# Patient Record
Sex: FEMALE | Race: WHITE | Employment: OTHER | URBAN - METROPOLITAN AREA
[De-identification: names, ages, dates, MRNs, and addresses within clinical notes are randomized per-mention and may not be internally consistent; named-entity substitution may affect disease eponyms.]

---

## 2017-02-03 ENCOUNTER — OFFICE VISIT (OUTPATIENT)
Dept: FAMILY MEDICINE CLINIC | Age: 82
End: 2017-02-03

## 2017-02-03 ENCOUNTER — HOSPITAL ENCOUNTER (OUTPATIENT)
Dept: LAB | Age: 82
Discharge: HOME | End: 2017-02-03
Payer: MEDICARE

## 2017-02-03 VITALS
SYSTOLIC BLOOD PRESSURE: 115 MMHG | HEART RATE: 65 BPM | BODY MASS INDEX: 28.82 KG/M2 | WEIGHT: 173 LBS | RESPIRATION RATE: 16 BRPM | HEIGHT: 65 IN | DIASTOLIC BLOOD PRESSURE: 71 MMHG | TEMPERATURE: 97.9 F | OXYGEN SATURATION: 96 %

## 2017-02-03 DIAGNOSIS — E78.5 HYPERLIPIDEMIA, UNSPECIFIED HYPERLIPIDEMIA TYPE: ICD-10-CM

## 2017-02-03 DIAGNOSIS — G89.29 CHRONIC RIGHT SHOULDER PAIN: ICD-10-CM

## 2017-02-03 DIAGNOSIS — R73.02 GLUCOSE INTOLERANCE (IMPAIRED GLUCOSE TOLERANCE): ICD-10-CM

## 2017-02-03 DIAGNOSIS — E03.2 HYPOTHYROIDISM DUE TO NON-MEDICATION EXOGENOUS SUBSTANCES: ICD-10-CM

## 2017-02-03 DIAGNOSIS — Z78.9 STATIN INTOLERANCE: ICD-10-CM

## 2017-02-03 DIAGNOSIS — N18.30 CHRONIC KIDNEY DISEASE (CKD), STAGE III (MODERATE) (HCC): ICD-10-CM

## 2017-02-03 DIAGNOSIS — M25.511 CHRONIC RIGHT SHOULDER PAIN: ICD-10-CM

## 2017-02-03 DIAGNOSIS — Z00.00 ROUTINE GENERAL MEDICAL EXAMINATION AT A HEALTH CARE FACILITY: Primary | ICD-10-CM

## 2017-02-03 DIAGNOSIS — Z13.39 SCREENING FOR ALCOHOLISM: ICD-10-CM

## 2017-02-03 DIAGNOSIS — I10 ESSENTIAL HYPERTENSION: ICD-10-CM

## 2017-02-03 LAB — HBA1C MFR BLD HPLC: 6.3 % (ref 4.8–5.6)

## 2017-02-03 PROCEDURE — 80053 COMPREHEN METABOLIC PANEL: CPT

## 2017-02-03 PROCEDURE — 84443 ASSAY THYROID STIM HORMONE: CPT

## 2017-02-03 PROCEDURE — 85025 COMPLETE CBC W/AUTO DIFF WBC: CPT

## 2017-02-03 PROCEDURE — 80061 LIPID PANEL: CPT

## 2017-02-03 PROCEDURE — 36415 COLL VENOUS BLD VENIPUNCTURE: CPT

## 2017-02-03 RX ORDER — CARVEDILOL 3.12 MG/1
3.12 TABLET ORAL 2 TIMES DAILY WITH MEALS
Qty: 180 TAB | Refills: 3 | Status: SHIPPED | OUTPATIENT
Start: 2017-02-03 | End: 2017-12-17 | Stop reason: SDUPTHER

## 2017-02-03 NOTE — PROGRESS NOTES
This is a Subsequent Medicare Annual Wellness Visit providing Personalized Prevention Plan Services (PPPS) (Performed 12 months after initial AWV and PPPS )    I have reviewed the patient's medical history in detail and updated the computerized patient record. History     Past Medical History   Diagnosis Date    Allergic rhinitis      asthma allergies    Arthritis     Chronic kidney disease (CKD), stage III (moderate)     GERD (gastroesophageal reflux disease)     Hyperlipidemia     Hypertension     Ill-defined condition      hard of hearing    Ulcerative colitis ([facility] [address] 10/28/2014      Past Surgical History   Procedure Laterality Date    Hx orthopaedic       bilateral knee replacement    Hx cataract removal Bilateral     Hx colonoscopy  9/14     ulcerative colitis     Current Outpatient Prescriptions   Medication Sig Dispense Refill    carvedilol (COREG) 3.125 mg tablet Take 1 Tab by mouth two (2) times daily (with meals). 180 Tab 3    azelastine (ASTELIN) 137 mcg (0.1 %) nasal spray USE 1 SPRAY IN EACH NOSTRIL TWICE A DAY AS DIRECTED 3 Bottle 4    omega-3 fatty acids-vitamin e (FISH OIL) 1,000 mg cap Take 1 Cap by mouth.  felodipine (PLENDIL SR) 10 mg 24 hr tablet TAKE 1 TABLET DAILY 90 Tab 4    VITAMIN D2 50,000 unit capsule TAKE ONE CAPSULE BY MOUTH ONCE WEEKLY 12 Cap 4    levothyroxine (SYNTHROID) 50 mcg tablet TAKE 1 TABLET DAILY BEFORE BREAKFAST 90 Tab 4    allopurinol (ZYLOPRIM) 100 mg tablet TAKE 1 TABLET DAILY 90 Tab 4    UBIDECARENONE (COENZYME Q10) 100 mg tab Take  by mouth.  rosuvastatin (CRESTOR) 5 mg tablet Take 5 mg by mouth nightly. Taking twice a week Mon and Fri      CHLORPHENIRAMINE MALEATE (CHLOR-TRIMETON PO) Take 4 mg by mouth every four (4) hours as needed.  ranitidine hcl (ZANTAC) 150 mg capsule Take 150 mg by mouth as needed.        Allergies   Allergen Reactions    Penicillin G Rash    Statins-Hmg-Coa Reductase Inhibitors Other (comments) arthralgia     Family History   Problem Relation Age of Onset    Diabetes Daughter      Social History   Substance Use Topics    Smoking status: Never Smoker    Smokeless tobacco: Never Used    Alcohol use No     Patient Active Problem List   Diagnosis Code    Hyperlipidemia E78.5    GERD (gastroesophageal reflux disease) K21.9    Hypertension I10    Allergic rhinitis J30.9    Chronic kidney disease (CKD), stage III (moderate) N18.3    Hypothyroid E03.9    Vitamin D deficiency E55.9    Ulcerative colitis ([address] K51.90    Statin intolerance Z78.9    Glucose intolerance (impaired glucose tolerance) R73.02     Ulcerative Colitis - on colonoscopy at [facility] in 2015, took medication for a while, then stopped an no more recurrence of symptoms . Now bowels moving regularly. No blood in BMs. HTN - no home monitoring. No shortness of breath, no chest pain, no vision change, no headache, no lower extremity edema. Not feeling light headed or dizzy. Glucose intolerance -  Not checking at home. No polyuria/polydipsia. No tingling or numbness in feet. Right shoulder pain - chronic since last fall, wonders about if it's arthritis. Hurts a lot when she lays down to go sleep, no history of trauma, no previous surgery, good range of motion. Tylenol helps a little. Difficulty to get clothes on and off. Hypothyroidism - no hair loss, no constipation, no dry skin or brittle nails, no palpitations. Taking medication each morning on an empty stomach.       Depression Risk Factor Screening:     PHQ 2 / 9, over the last two weeks 10/4/2016   Little interest or pleasure in doing things Not at all   Feeling down, depressed or hopeless Not at all   Total Score PHQ 2 0   Trouble falling or staying asleep, or sleeping too much -   Feeling tired or having little energy -   Poor appetite or overeating -   Feeling bad about yourself - or that you are a failure or have let yourself or your family down -   Trouble concentrating on things such as school, work, reading or watching TV -   Moving or speaking so slowly that other people could have noticed; or the opposite being so fidgety that others notice -   Thoughts of being better off dead, or hurting yourself in some way -   PHQ 9 Score -   How difficult have these problems made it for you to do your work, take care of your home and get along with others -     Alcohol Risk Factor Screening: On any occasion during the past 3 months, have you had more than 3 drinks containing alcohol? No    Do you average more than 7 drinks per week? No      Functional Ability and Level of Safety:     Hearing Loss   Moderate - hearing aids    Activities of Daily Living   Self-care. Requires assistance with: no ADLs    Fall Risk     Fall Risk Assessment, last 12 mths 10/4/2016   Able to walk? Yes   Fall in past 12 months? No     Abuse Screen   Patient is not abused    Review of Systems   A comprehensive review of systems was negative except for that written in the HPI. Physical Examination     Evaluation of Cognitive Function:  Mood/affect:  happy  Appearance: age appropriate  Family member/caregiver input: none    Visit Vitals    /71 (BP 1 Location: Left arm, BP Patient Position: Sitting)    Pulse 65    Temp 97.9 °F (36.6 °C) (Oral)    Resp 16    Ht 5' 5\" (1.651 m)    Wt 173 lb (78.5 kg)    SpO2 96%    BMI 28.79 kg/m2     General:  Alert, cooperative, no distress, appears stated age. Head:  Normocephalic, without obvious abnormality, atraumatic. Eyes:  Conjunctivae/corneas clear. PERRL, EOMs intact. Ears:  Normal TMs and external ear canals both ears. Nose: Nares normal. Septum midline. Mucosa normal. No drainage or sinus tenderness. Throat: Lips, mucosa, and tongue normal. Teeth and gums normal.   Neck: Supple, symmetrical, trachea midline, no adenopathy, thyroid: no enlargement/tenderness/nodules, no carotid bruit and no JVD.    Back:   Symmetric, no curvature. ROM normal. No CVA tenderness. Lungs:   Clear to auscultation bilaterally. Chest wall:  No tenderness or deformity. Heart:  Regular rate and rhythm, S1, S2 normal, no murmur, click, rub or gallop. Abdomen:   Soft, non-tender. Bowel sounds normal. No masses,  No organomegaly. Extremities: Good range of motion, some tenderness along scapula, collarbone, and deltoid, pain with extremes ofmotion. Pulses: 2+ and symmetric all extremities. Skin: Skin color, texture, turgor normal. No rashes or lesions. Lymph nodes: Cervical, supraclavicular, and axillary nodes normal.   Neurologic: CNII-XII intact. Normal strength, sensation and reflexes throughout. Results for orders placed or performed in visit on 02/03/17   AMB POC HEMOGLOBIN A1C   Result Value Ref Range    Hemoglobin A1c (POC) 6.3 (A) 4.8 - 5.6 %       Patient Care Team:  Ricco Zepeda MD as PCP - General (Internal Medicine)    Advice/Referrals/Counseling   Education and counseling provided:  Are appropriate based on today's review and evaluation  End-of-Life planning (with patient's consent)  Screening Mammography    Assessment/Plan       ICD-10-CM ICD-9-CM    1. Routine general medical examination at a health care facility - Health Maintenance reviewed - updated. Z00.00 V70.0    2. Chronic kidney disease (CKD), stage III (moderate) - No changes in medications pending lab results. N18.3 585.3    3. Statin intolerance Z78.9 995.27    4. Hypothyroidism due to non-medication exogenous substances - No changes in medications pending lab results. E03.2 244.3 TSH 3RD GENERATION   5. Hyperlipidemia, unspecified hyperlipidemia type - At goal.  Continue current medications. B87.2 027.5 METABOLIC PANEL, COMPREHENSIVE      LIPID PANEL   6. Essential hypertension - At goal.  Continue current medications. I10 401.9 CBC WITH AUTOMATED DIFF   7.  Glucose intolerance (impaired glucose tolerance) - no need for medicaiton at this time, continue diet and exercise R73.02 790.22 AMB POC HEMOGLOBIN A1C   8. Screening for alcoholism Z13.89 V79.1    9. Chronic right shoulder pain - no apparent tear or bursitis. Likely arthritis, refer to ortho. Use tylenolw ith night time advil sparingly M25.511 719.41 REFERRAL TO ORTHOPEDIC SURGERY    G89.29 338.29      Verbal and written instructions (see AVS) provided. Patient expresses understanding of diagnosis and treatment plan. Current Outpatient Prescriptions   Medication Sig Dispense Refill    carvedilol (COREG) 3.125 mg tablet Take 1 Tab by mouth two (2) times daily (with meals). 180 Tab 3    azelastine (ASTELIN) 137 mcg (0.1 %) nasal spray USE 1 SPRAY IN EACH NOSTRIL TWICE A DAY AS DIRECTED 3 Bottle 4    omega-3 fatty acids-vitamin e (FISH OIL) 1,000 mg cap Take 1 Cap by mouth.  felodipine (PLENDIL SR) 10 mg 24 hr tablet TAKE 1 TABLET DAILY 90 Tab 4    VITAMIN D2 50,000 unit capsule TAKE ONE CAPSULE BY MOUTH ONCE WEEKLY 12 Cap 4    levothyroxine (SYNTHROID) 50 mcg tablet TAKE 1 TABLET DAILY BEFORE BREAKFAST 90 Tab 4    allopurinol (ZYLOPRIM) 100 mg tablet TAKE 1 TABLET DAILY 90 Tab 4    UBIDECARENONE (COENZYME Q10) 100 mg tab Take  by mouth.  rosuvastatin (CRESTOR) 5 mg tablet Take 5 mg by mouth nightly. Taking twice a week Mon and Fri      CHLORPHENIRAMINE MALEATE (CHLOR-TRIMETON PO) Take 4 mg by mouth every four (4) hours as needed.  ranitidine hcl (ZANTAC) 150 mg capsule Take 150 mg by mouth as needed.

## 2017-02-03 NOTE — ACP (ADVANCE CARE PLANNING)
Advance Care Planning (ACP) Provider Conversation Snapshot    Date of ACP Conversation: 02/03/17  Persons included in Conversation:  Patient    Has a living will and plans to bring it by to be scanned into system.

## 2017-02-03 NOTE — MR AVS SNAPSHOT
Visit Information Date & Time Provider Department Dept. Phone Encounter #  
 2/3/2017  9:15 AM Mary Anne [facility] [phone] [id number] Upcoming Health Maintenance Date Due  
 MEDICARE YEARLY EXAM 12/10/2016 GLAUCOMA SCREENING Q2Y 5/3/2018 DTaP/Tdap/Td series (2 - Td) 2/3/2027 Allergies as of 2/3/2017  Review Complete On: 2/3/2017 By: Sandy Shepherd Severity Noted Reaction Type Reactions Penicillin G High 06/08/2010    Rash Statins-hmg-coa Reductase Inhibitors  10/28/2014    Other (comments) arthralgia Current Immunizations  Reviewed on 12/10/2015 Name Date Influenza High Dose Vaccine PF 9/15/2016, 9/21/2015 Influenza Vaccine 9/12/2014 Pneumococcal Polysaccharide (PPSV-23) 4/9/2013 Not reviewed this visit You Were Diagnosed With   
  
 Codes Comments Routine general medical examination at a health care facility    -  Primary ICD-10-CM: Z00.00 ICD-9-CM: V70.0 Chronic kidney disease (CKD), stage III (moderate)     ICD-10-CM: N18.3 ICD-9-CM: 724. 3 Statin intolerance     ICD-10-CM: Z78.9 ICD-9-CM: 995.27 Hypothyroidism due to non-medication exogenous substances     ICD-10-CM: E03.2 ICD-9-CM: 244.3 Hyperlipidemia, unspecified hyperlipidemia type     ICD-10-CM: E78.5 ICD-9-CM: 272.4 Essential hypertension     ICD-10-CM: I10 
ICD-9-CM: 401.9 Glucose intolerance (impaired glucose tolerance)     ICD-10-CM: R73.02 
ICD-9-CM: 790.22 Screening for alcoholism     ICD-10-CM: Z13.89 ICD-9-CM: V79.1 Chronic right shoulder pain     ICD-10-CM: M25.511, G89.29 ICD-9-CM: 719.41, 338.29 Vitals BP Pulse Temp Resp Height(growth percentile) Weight(growth percentile) 115/71 (BP 1 Location: Left arm, BP Patient Position: Sitting) 65 97.9 °F (36.6 °C) (Oral) 16 5' 5\" (1.651 m) 173 lb (78.5 kg) SpO2 BMI OB Status Smoking Status 96% 28.79 kg/m2 Postmenopausal Never Smoker BMI and BSA Data Body Mass Index Body Surface Area 28.79 kg/m 2 1.9 m 2 Preferred Pharmacy Pharmacy Name Phone [address] [facility] [phone] Your Updated Medication List  
  
   
This list is accurate as of: 2/3/17 10:40 AM.  Always use your most recent med list.  
  
  
  
  
 allopurinol 100 mg tablet Commonly known as:  ZYLOPRIM  
TAKE 1 TABLET DAILY  
  
 azelastine 137 mcg (0.1 %) nasal spray Commonly known as:  ASTELIN  
USE 1 SPRAY IN EACH NOSTRIL TWICE A DAY AS DIRECTED  
  
 carvedilol 3.125 mg tablet Commonly known as:  Arlin Locket Take 1 Tab by mouth two (2) times daily (with meals). CHLOR-TRIMETON PO Take 4 mg by mouth every four (4) hours as needed. coenzyme Q10 100 mg Tab Take  by mouth. CRESTOR 5 mg tablet Generic drug:  rosuvastatin Take 5 mg by mouth nightly. Taking twice a week Mon and Fri  
  
 felodipine 10 mg 24 hr tablet Commonly known as:  PLENDIL SR  
TAKE 1 TABLET DAILY FISH OIL 1,000 mg Cap Generic drug:  omega-3 fatty acids-vitamin e Take 1 Cap by mouth.  
  
 levothyroxine 50 mcg tablet Commonly known as:  SYNTHROID  
TAKE 1 TABLET DAILY BEFORE BREAKFAST  
  
 VITAMIN D2 50,000 unit capsule Generic drug:  ergocalciferol TAKE ONE CAPSULE BY MOUTH ONCE WEEKLY  
  
 ZANTAC 150 mg capsule Generic drug:  raNITIdine hcl Take 150 mg by mouth as needed. Prescriptions Sent to Pharmacy Refills  
 carvedilol (COREG) 3.125 mg tablet 3 Sig: Take 1 Tab by mouth two (2) times daily (with meals). Class: Normal  
 Pharmacy: [address], [address] [place] [address] #: [phone] Route: Oral  
  
We Performed the Following AMB POC HEMOGLOBIN A1C [40264 CPT(R)] CBC WITH AUTOMATED DIFF [48367 CPT(R)] LIPID PANEL [75338 CPT(R)] METABOLIC PANEL, COMPREHENSIVE [15938 CPT(R)] REFERRAL TO ORTHOPEDIC SURGERY [REF62 Custom] TSH 3RD GENERATION [05761 CPT(R)] Referral Information Referral ID Referred By Referred To  
  
 [id number] [facility] [id number], [address] Vis[address] Status Start Date End Date 1 New Request 2/3/17 2/3/18 If your referral has a status of pending review or denied, additional information will be sent to support the outcome of this decision. Patient Instructions Medicare Wellness Visit, Female The best way to live healthy is to have a healthy lifestyle by eating a well-balanced diet, exercising regularly, limiting alcohol and stopping smoking. Regular physical exams and screening tests are another way to keep healthy. Preventive exams provided by your health care provider can find health problems before they become diseases or illnesses. Preventive services including immunizations, screening tests, monitoring and exams can help you take care of your own health. All people over age 72 should have a pneumovax  and and a prevnar shot to prevent pneumonia. These are once in a lifetime unless you and your provider decide differently. All people over 65 should have a yearly flu shot and a tetanus vaccine every 10 years. A bone mass density to screen for osteoporosis or thinning of the bones should be done every 2 years after 65. Screening for diabetes mellitus with a blood sugar test should be done every year. Glaucoma is a disease of the eye due to increased ocular pressure that can lead to blindness and it should be done every year by an eye professional. 
 
Cardiovascular screening tests that check for elevated lipids (fatty part of blood) which can lead to heart disease and strokes should be done every 5 years.  
 
Colorectal screening that evaluates for blood or polyps in your colon should be done yearly as a stool test or every five years as a flexible sigmoidoscope or every 10 years as a colonoscopy up to age 76. Breast cancer screening with a mammogram is recommended biennially  for women age 54-69. Screening for cervical cancer with a pap smear and pelvic exam is recommended for women after age 72 years every 2 years up to age 79 or when the provider and patient decide to stop. If there is a history of cervical abnormalities or other increased risk for cancer then the test is recommended yearly. Hepatitis C screening is also recommended for anyone born between [birth year] through [address]. A shingles vaccine is also recommended once in a lifetime after age 61. Your Medicare Wellness Exam is recommended annually. Here is a list of your current Health Maintenance items with a due date: 
Up to date Introducing [facility]! Ruth Gill introduces [employer] patient portal. Now you can access parts of your medical record, email your doctor's office, and request medication refills online. 1. In your internet browser, go to [email or web address] 2. Click on the First Time User? Click Here link in the Sign In box. You will see the New Member Sign Up page. 3. Enter your [employer] Access Code exactly as it appears below. You will not need to use this code after youve completed the sign-up process. If you do not sign up before the expiration date, you must request a new code. · [employer] Access Code: [email or web address] Expires: 5/4/2017 10:18 AM 
 
4. Enter the last four digits of your Social Security Number (xxxx) and Date of Birth (mm/dd/yyyy) as indicated and click Submit. You will be taken to the next sign-up page. 5. Create a [employer] ID. This will be your [employer] login ID and cannot be changed, so think of one that is secure and easy to remember. 6. Create a [employer] password. You can change your password at any time. 7. Enter your Password Reset Question and Answer. This can be used at a later time if you forget your password. 8. Enter your e-mail address. You will receive e-mail notification when new information is available in [address] 9. Click Sign Up. You can now view and download portions of your medical record. 10. Click the Download Summary menu link to download a portable copy of your medical information. If you have questions, please visit the Frequently Asked Questions section of the [employer] website. Remember, [employer] is NOT to be used for urgent needs. For medical emergencies, dial 911. Now available from your iPhone and Android! Please provide this summary of care documentation to your next provider. Your primary care clinician is listed as Amy Abdi. If you have any questions after today's visit, please call [phone].

## 2017-02-03 NOTE — PATIENT INSTRUCTIONS

## 2017-02-04 LAB
ALBUMIN SERPL-MCNC: 4.4 G/DL (ref 3.5–4.7)
ALBUMIN/GLOB SERPL: 1.3 {RATIO} (ref 1.1–2.5)
ALP SERPL-CCNC: 60 IU/L (ref 39–117)
ALT SERPL-CCNC: 32 IU/L (ref 0–32)
AST SERPL-CCNC: 37 IU/L (ref 0–40)
BASOPHILS # BLD AUTO: 0 X10E3/UL (ref 0–0.2)
BASOPHILS NFR BLD AUTO: 1 %
BILIRUB SERPL-MCNC: 0.6 MG/DL (ref 0–1.2)
BUN SERPL-MCNC: 30 MG/DL (ref 8–27)
BUN/CREAT SERPL: 20 (ref 11–26)
CALCIUM SERPL-MCNC: 9.8 MG/DL (ref 8.7–10.3)
CHLORIDE SERPL-SCNC: 102 MMOL/L (ref 96–106)
CHOLEST SERPL-MCNC: 195 MG/DL (ref 100–199)
CO2 SERPL-SCNC: 21 MMOL/L (ref 18–29)
CREAT SERPL-MCNC: 1.52 MG/DL (ref 0.57–1)
EOSINOPHIL # BLD AUTO: 0.4 X10E3/UL (ref 0–0.4)
EOSINOPHIL NFR BLD AUTO: 4 %
ERYTHROCYTE [DISTWIDTH] IN BLOOD BY AUTOMATED COUNT: 15.2 % (ref 12.3–15.4)
GLOBULIN SER CALC-MCNC: 3.3 G/DL (ref 1.5–4.5)
GLUCOSE SERPL-MCNC: 125 MG/DL (ref 65–99)
HCT VFR BLD AUTO: 39.2 % (ref 34–46.6)
HDLC SERPL-MCNC: 75 MG/DL
HGB BLD-MCNC: 13.2 G/DL (ref 11.1–15.9)
IMM GRANULOCYTES # BLD: 0 X10E3/UL (ref 0–0.1)
IMM GRANULOCYTES NFR BLD: 0 %
INTERPRETATION, 910389: NORMAL
INTERPRETATION: NORMAL
LDLC SERPL CALC-MCNC: 97 MG/DL (ref 0–99)
LYMPHOCYTES # BLD AUTO: 3.3 X10E3/UL (ref 0.7–3.1)
LYMPHOCYTES NFR BLD AUTO: 39 %
MCH RBC QN AUTO: 32.5 PG (ref 26.6–33)
MCHC RBC AUTO-ENTMCNC: 33.7 G/DL (ref 31.5–35.7)
MCV RBC AUTO: 97 FL (ref 79–97)
MONOCYTES # BLD AUTO: 0.7 X10E3/UL (ref 0.1–0.9)
MONOCYTES NFR BLD AUTO: 8 %
NEUTROPHILS # BLD AUTO: 4 X10E3/UL (ref 1.4–7)
NEUTROPHILS NFR BLD AUTO: 48 %
PDF IMAGE, 910387: NORMAL
PLATELET # BLD AUTO: 247 X10E3/UL (ref 150–379)
POTASSIUM SERPL-SCNC: 5.1 MMOL/L (ref 3.5–5.2)
PROT SERPL-MCNC: 7.7 G/DL (ref 6–8.5)
RBC # BLD AUTO: 4.06 X10E6/UL (ref 3.77–5.28)
SODIUM SERPL-SCNC: 139 MMOL/L (ref 134–144)
TRIGL SERPL-MCNC: 116 MG/DL (ref 0–149)
TSH SERPL DL<=0.005 MIU/L-ACNC: 2.23 UIU/ML (ref 0.45–4.5)
VLDLC SERPL CALC-MCNC: 23 MG/DL (ref 5–40)
WBC # BLD AUTO: 8.5 X10E3/UL (ref 3.4–10.8)

## 2017-08-10 RX ORDER — ERGOCALCIFEROL 1.25 MG/1
CAPSULE ORAL
Qty: 12 CAP | Refills: 4 | Status: SHIPPED | OUTPATIENT
Start: 2017-08-10 | End: 2018-09-07 | Stop reason: DRUGHIGH

## 2017-08-24 ENCOUNTER — HOSPITAL ENCOUNTER (OUTPATIENT)
Dept: LAB | Age: 82
Discharge: HOME | End: 2017-08-24
Payer: MEDICARE

## 2017-08-24 ENCOUNTER — OFFICE VISIT (OUTPATIENT)
Dept: FAMILY MEDICINE CLINIC | Age: 82
End: 2017-08-24

## 2017-08-24 VITALS
TEMPERATURE: 97.9 F | WEIGHT: 168 LBS | BODY MASS INDEX: 27.99 KG/M2 | RESPIRATION RATE: 18 BRPM | DIASTOLIC BLOOD PRESSURE: 69 MMHG | HEART RATE: 74 BPM | SYSTOLIC BLOOD PRESSURE: 114 MMHG | OXYGEN SATURATION: 96 % | HEIGHT: 65 IN

## 2017-08-24 DIAGNOSIS — R73.02 GLUCOSE INTOLERANCE (IMPAIRED GLUCOSE TOLERANCE): Primary | ICD-10-CM

## 2017-08-24 DIAGNOSIS — M79.671 RIGHT FOOT PAIN: ICD-10-CM

## 2017-08-24 DIAGNOSIS — E03.2 HYPOTHYROIDISM DUE TO NON-MEDICATION EXOGENOUS SUBSTANCES: ICD-10-CM

## 2017-08-24 DIAGNOSIS — I10 ESSENTIAL HYPERTENSION: ICD-10-CM

## 2017-08-24 DIAGNOSIS — E78.5 HYPERLIPIDEMIA, UNSPECIFIED HYPERLIPIDEMIA TYPE: ICD-10-CM

## 2017-08-24 DIAGNOSIS — Z12.31 ENCOUNTER FOR MAMMOGRAM TO ESTABLISH BASELINE MAMMOGRAM: ICD-10-CM

## 2017-08-24 PROCEDURE — 36415 COLL VENOUS BLD VENIPUNCTURE: CPT

## 2017-08-24 PROCEDURE — 84443 ASSAY THYROID STIM HORMONE: CPT

## 2017-08-24 PROCEDURE — 83036 HEMOGLOBIN GLYCOSYLATED A1C: CPT

## 2017-08-24 PROCEDURE — 80061 LIPID PANEL: CPT

## 2017-08-24 PROCEDURE — 85025 COMPLETE CBC W/AUTO DIFF WBC: CPT

## 2017-08-24 PROCEDURE — 80053 COMPREHEN METABOLIC PANEL: CPT

## 2017-08-24 NOTE — PROGRESS NOTES
HPI:  81 y.o.  presents for follow up appointment. No hospital, ER or specialist visits since last primary care visit except as noted below. Right foot pain - thinks it's arthritis, toes are deviating. Left breast tender - can't feel a lump, but tender. Patient Active Problem List    Diagnosis    Glucose intolerance (impaired glucose tolerance) - some polyuria - getting up every 2 hours overnight. No polydipsia. No unexplained weight loss. No change in vision. No tingling or numbness in feet. Has a sore on left leg    Ulcerative colitis ([address] - refuses further colonoscopys, still with chronic diarrhea - nonbloody.  Statin intolerance    Vitamin D deficiency    Hypothyroid - No hair loss, no constipation, no dry skin or brittle nails, no palpitations. Taking medication each morning on an empty stomach.  Hyperlipidemia - Takes medication at night as directed, no muscle aches. Tries to watch diet.  GERD (gastroesophageal reflux disease)    Hypertension - No home monitoring. Compliant with medication. Sometimes shortness of breath which she blames on her nasal congestion, no chest pain, no vision change, no headache, chronic mild lower extremity edema.  Allergic rhinitis - using nasal spray and zyrtec.  Sees ENT, has deviated septum    Chronic kidney disease (CKD), stage III (moderate) - no change in edema         Past Medical History:   Diagnosis Date    Allergic rhinitis     asthma allergies    Arthritis     Chronic kidney disease (CKD), stage III (moderate)     GERD (gastroesophageal reflux disease)     Hyperlipidemia     Hypertension     Ill-defined condition     hard of hearing    Ulcerative colitis ([address] 10/28/2014       Social History   Substance Use Topics    Smoking status: Never Smoker    Smokeless tobacco: Never Used    Alcohol use No       Outpatient Prescriptions Marked as Taking for the 8/24/17 encounter (Office Visit) with Camila Welch MD   Medication Sig Dispense Refill    ergocalciferol (VITAMIN D2) 50,000 unit capsule TAKE ONE CAPSULE BY MOUTH ONCE WEEKLY 12 Cap 4    carvedilol (COREG) 3.125 mg tablet Take 1 Tab by mouth two (2) times daily (with meals). 180 Tab 3    azelastine (ASTELIN) 137 mcg (0.1 %) nasal spray USE 1 SPRAY IN EACH NOSTRIL TWICE A DAY AS DIRECTED 3 Bottle 4    omega-3 fatty acids-vitamin e (FISH OIL) 1,000 mg cap Take 1 Cap by mouth.  felodipine (PLENDIL SR) 10 mg 24 hr tablet TAKE 1 TABLET DAILY 90 Tab 4    levothyroxine (SYNTHROID) 50 mcg tablet TAKE 1 TABLET DAILY BEFORE BREAKFAST 90 Tab 4    allopurinol (ZYLOPRIM) 100 mg tablet TAKE 1 TABLET DAILY 90 Tab 4    UBIDECARENONE (COENZYME Q10) 100 mg tab Take  by mouth.  rosuvastatin (CRESTOR) 5 mg tablet Take 5 mg by mouth nightly. Taking twice a week Mon and Fri      CHLORPHENIRAMINE MALEATE (CHLOR-TRIMETON PO) Take 4 mg by mouth every four (4) hours as needed.  ranitidine hcl (ZANTAC) 150 mg capsule Take 150 mg by mouth as needed. Allergies   Allergen Reactions    Penicillin G Rash    Statins-Hmg-Coa Reductase Inhibitors Other (comments)     arthralgia       ROS:  ROS negative except as per HPI.       PE:  Visit Vitals    /69 (BP 1 Location: Left arm, BP Patient Position: Sitting)    Pulse 74    Temp 97.9 °F (36.6 °C) (Oral)    Resp 18    Ht 5' 5\" (1.651 m)    Wt 168 lb (76.2 kg)    SpO2 96%    BMI 27.96 kg/m2     Gen: alert, oriented, no acute distress  Head: normocephalic, atraumatic  Ears: external auditory canals clear, TMs without erythema or effusion  Eyes: pupils equal round reactive to light, sclera clear, conjunctiva clear  Oral: moist mucus membranes, no oral lesions, no pharyngeal inflammation or exudate  Neck: symmetric normal sized thyroid, no carotid bruits, no jugular vein distention  Resp: no increase work of breathing, lungs clear to ausculation bilaterally, no wheezing, rales or rhonchi  CV: S1, S2 normal.  No murmurs, rubs, or gallops. Abd: soft, not tender, not distended. No hepatosplenomegaly. Normal bowel sounds. Neuro: cranial nerves intact, normal strength and movement in all extremities, reflexes and sensation intact and symmetric. Skin: no lesion or rash  Extremities: trace edema, right 3rd and 4th toe are spreading apart      Assessment/Plan:    1. Glucose intolerance (impaired glucose tolerance)  No changes in medications pending lab results.  - HEMOGLOBIN A1C WITH EAG    2. Hypothyroidism due to non-medication exogenous substances  No changes in medications pending lab results.  - TSH 3RD GENERATION    3. Hyperlipidemia, unspecified hyperlipidemia type  No changes in medications pending lab results. - METABOLIC PANEL, COMPREHENSIVE  - LIPID PANEL    4. Essential hypertension  At goal.  Continue current medications. - CBC WITH AUTOMATED DIFF    5. Encounter for mammogram to establish baseline mammogram  Overdue for screening and now pain in left breast    6. Foot pain  Refer to podiatry    Health Maintenance reviewed - updated. Orders Placed This Encounter    CBC WITH AUTOMATED DIFF    METABOLIC PANEL, COMPREHENSIVE    LIPID PANEL    TSH 3RD GENERATION    HEMOGLOBIN A1C WITH EAG       There are no discontinued medications. Current Outpatient Prescriptions   Medication Sig Dispense Refill    ergocalciferol (VITAMIN D2) 50,000 unit capsule TAKE ONE CAPSULE BY MOUTH ONCE WEEKLY 12 Cap 4    carvedilol (COREG) 3.125 mg tablet Take 1 Tab by mouth two (2) times daily (with meals). 180 Tab 3    azelastine (ASTELIN) 137 mcg (0.1 %) nasal spray USE 1 SPRAY IN EACH NOSTRIL TWICE A DAY AS DIRECTED 3 Bottle 4    omega-3 fatty acids-vitamin e (FISH OIL) 1,000 mg cap Take 1 Cap by mouth.       felodipine (PLENDIL SR) 10 mg 24 hr tablet TAKE 1 TABLET DAILY 90 Tab 4    levothyroxine (SYNTHROID) 50 mcg tablet TAKE 1 TABLET DAILY BEFORE BREAKFAST 90 Tab 4    allopurinol (ZYLOPRIM) 100 mg tablet TAKE 1 TABLET DAILY 90 Tab 4    UBIDECARENONE (COENZYME Q10) 100 mg tab Take  by mouth.  rosuvastatin (CRESTOR) 5 mg tablet Take 5 mg by mouth nightly. Taking twice a week Mon and Fri      CHLORPHENIRAMINE MALEATE (CHLOR-TRIMETON PO) Take 4 mg by mouth every four (4) hours as needed.  ranitidine hcl (ZANTAC) 150 mg capsule Take 150 mg by mouth as needed. Reminded to return in 1 month for flu shot    Verbal and written instructions (see AVS) provided. Patient expresses understanding of diagnosis and treatment plan.

## 2017-08-24 NOTE — PROGRESS NOTES
Chief Complaint   Patient presents with    Hypertension     6 month follow-up     Health Maintenance reviewed

## 2017-08-24 NOTE — PATIENT INSTRUCTIONS

## 2017-08-24 NOTE — MR AVS SNAPSHOT
Visit Information Date & Time Provider Department Dept. Phone Encounter #  
 8/24/2017  9:00 AM [place] [id number] [phone] [id number] Upcoming Health Maintenance Date Due INFLUENZA AGE 9 TO ADULT 8/1/2017 MEDICARE YEARLY EXAM 2/4/2018 GLAUCOMA SCREENING Q2Y 5/3/2018 DTaP/Tdap/Td series (2 - Td) 2/3/2027 Allergies as of 8/24/2017  Review Complete On: 8/24/2017 By: Roddy Almonte MD  
  
 Severity Noted Reaction Type Reactions Penicillin G High 06/08/2010    Rash Statins-hmg-coa Reductase Inhibitors  10/28/2014    Other (comments) arthralgia Current Immunizations  Reviewed on 3/28/2017 Name Date Influenza High Dose Vaccine PF 9/15/2016, 9/21/2015 Influenza Vaccine 9/12/2014 Pneumococcal Conjugate (PCV-13) 11/4/2014 Pneumococcal Polysaccharide (PPSV-23) 4/9/2013 Zoster Vaccine, Live 11/4/2014 Not reviewed this visit You Were Diagnosed With   
  
 Codes Comments Glucose intolerance (impaired glucose tolerance)    -  Primary ICD-10-CM: R73.02 
ICD-9-CM: 790.22 Hypothyroidism due to non-medication exogenous substances     ICD-10-CM: E03.2 ICD-9-CM: 244.3 Hyperlipidemia, unspecified hyperlipidemia type     ICD-10-CM: E78.5 ICD-9-CM: 272.4 Essential hypertension     ICD-10-CM: I10 
ICD-9-CM: 401.9 Encounter for mammogram to establish baseline mammogram     ICD-10-CM: Z12.31 
ICD-9-CM: V76.12 Vitals BP Pulse Temp Resp Height(growth percentile) Weight(growth percentile) 114/69 (BP 1 Location: Left arm, BP Patient Position: Sitting) 74 97.9 °F (36.6 °C) (Oral) 18 5' 5\" (1.651 m) 168 lb (76.2 kg) SpO2 BMI OB Status Smoking Status 96% 27.96 kg/m2 Postmenopausal Never Smoker Vitals History BMI and BSA Data Body Mass Index Body Surface Area  
 27.96 kg/m 2 1.87 m 2 Preferred Pharmacy Pharmacy Name Phone [facility] [address], [address] [place] [address] Bridgett Norman [phone] Your Updated Medication List  
  
   
This list is accurate as of: 8/24/17 10:03 AM.  Always use your most recent med list.  
  
  
  
  
 allopurinol 100 mg tablet Commonly known as:  ZYLOPRIM  
TAKE 1 TABLET DAILY  
  
 azelastine 137 mcg (0.1 %) nasal spray Commonly known as:  ASTELIN  
USE 1 SPRAY IN EACH NOSTRIL TWICE A DAY AS DIRECTED  
  
 carvedilol 3.125 mg tablet Commonly known as:  Raynette Hy Take 1 Tab by mouth two (2) times daily (with meals). CHLOR-TRIMETON PO Take 4 mg by mouth every four (4) hours as needed. coenzyme Q10 100 mg Tab Take  by mouth. CRESTOR 5 mg tablet Generic drug:  rosuvastatin Take 5 mg by mouth nightly. Taking twice a week Mon and Fri  
  
 ergocalciferol 50,000 unit capsule Commonly known as:  VITAMIN D2  
TAKE ONE CAPSULE BY MOUTH ONCE WEEKLY  
  
 felodipine 10 mg 24 hr tablet Commonly known as:  PLENDIL SR  
TAKE 1 TABLET DAILY FISH OIL 1,000 mg Cap Generic drug:  omega-3 fatty acids-vitamin e Take 1 Cap by mouth.  
  
 levothyroxine 50 mcg tablet Commonly known as:  SYNTHROID  
TAKE 1 TABLET DAILY BEFORE BREAKFAST  
  
 ZANTAC 150 mg capsule Generic drug:  raNITIdine hcl Take 150 mg by mouth as needed. We Performed the Following CBC WITH AUTOMATED DIFF [17210 CPT(R)] HEMOGLOBIN A1C WITH EAG [96750 CPT(R)] LIPID PANEL [38064 CPT(R)] METABOLIC PANEL, COMPREHENSIVE [93775 CPT(R)] TSH 3RD GENERATION [03537 CPT(R)] To-Do List   
 08/24/2017 Imaging:  [facility] M[facility]MO BI SCREENING INCL CAD Patient Instructions High Cholesterol: Care Instructions Your Care Instructions Cholesterol is a type of fat in your blood. It is needed for many body functions, such as making new cells. Cholesterol is made by your body.  It also comes from food you eat. High cholesterol means that you have too much of the fat in your blood. This raises your risk of a heart attack and stroke. LDL and HDL are part of your total cholesterol. LDL is the \"bad\" cholesterol. High LDL can raise your risk for heart disease, heart attack, and stroke. HDL is the \"good\" cholesterol. It helps clear bad cholesterol from the body. High HDL is linked with a lower risk of heart disease, heart attack, and stroke. Your cholesterol levels help your doctor find out your risk for having a heart attack or stroke. You and your doctor can talk about whether you need to lower your risk and what treatment is best for you. A heart-healthy lifestyle along with medicines can help lower your cholesterol and your risk. The way you choose to lower your risk will depend on how high your risk is for heart attack and stroke. It will also depend on how you feel about taking medicines. Follow-up care is a key part of your treatment and safety. Be sure to make and go to all appointments, and call your doctor if you are having problems. It's also a good idea to know your test results and keep a list of the medicines you take. How can you care for yourself at home? · Eat a variety of foods every day. Good choices include fruits, vegetables, whole grains (like oatmeal), dried beans and peas, nuts and seeds, soy products (like tofu), and fat-free or low-fat dairy products. · Replace butter, margarine, and hydrogenated or partially hydrogenated oils with olive and canola oils. (Canola oil margarine without trans fat is fine.) · Replace red meat with fish, poultry, and soy protein (like tofu). · Limit processed and packaged foods like chips, crackers, and cookies. · Bake, broil, or steam foods. Don't dorsey them. · Be physically active. Get at least 30 minutes of exercise on most days of the week. Walking is a good choice.  You also may want to do other activities, such as running, swimming, cycling, or playing tennis or team sports. · Stay at a healthy weight or lose weight by making the changes in eating and physical activity listed above. Losing just a small amount of weight, even 5 to 10 pounds, can reduce your risk for having a heart attack or stroke. · Do not smoke. When should you call for help? Watch closely for changes in your health, and be sure to contact your doctor if: 
· You need help making lifestyle changes. · You have questions about your medicine. Where can you learn more? Go to http://teresa-abdi.info/. Enter C353 in the search box to learn more about \"High Cholesterol: Care Instructions. \" Current as of: April 3, 2017 Content Version: 11.3 © 5390-9112 [employer]. Care instructions adapted under license by [employer] (which disclaims liability or warranty for this information). If you have questions about a medical condition or this instruction, always ask your healthcare professional. [address] any warranty or liability for your use of this information. Introducing [facility]! Tina Austin introduces [employer] patient portal. Now you can access parts of your medical record, email your doctor's office, and request medication refills online. 1. In your internet browser, go to [email or web address] 2. Click on the First Time User? Click Here link in the Sign In box. You will see the New Member Sign Up page. 3. Enter your [employer] Access Code exactly as it appears below. You will not need to use this code after youve completed the sign-up process. If you do not sign up before the expiration date, you must request a new code. · [employer] Access Code: [email or web address] Expires: 11/22/2017  9:01 AM 
 
4.  Enter the last four digits of your Social Security Number (xxxx) and Date of Birth (mm/dd/yyyy) as indicated and click Submit. You will be taken to the next sign-up page. 5. Create a R[employer]BASE ID. This will be your [employer] login ID and cannot be changed, so think of one that is secure and easy to remember. 6. Create a [employer] password. You can change your password at any time. 7. Enter your Password Reset Question and Answer. This can be used at a later time if you forget your password. 8. Enter your e-mail address. You will receive e-mail notification when new information is available in [address] 9. Click Sign Up. You can now view and download portions of your medical record. 10. Click the Download Summary menu link to download a portable copy of your medical information. If you have questions, please visit the Frequently Asked Questions section of the [employer] website. Remember, [employer] is NOT to be used for urgent needs. For medical emergencies, dial 911. Now available from your iPhone and Android! Please provide this summary of care documentation to your next provider. Your primary care clinician is listed as Jaleel Mcgee. If you have any questions after today's visit, please call [phone].

## 2017-08-25 LAB
ALBUMIN SERPL-MCNC: 4.4 G/DL (ref 3.5–4.7)
ALBUMIN/GLOB SERPL: 1.4 {RATIO} (ref 1.2–2.2)
ALP SERPL-CCNC: 68 IU/L (ref 39–117)
ALT SERPL-CCNC: 28 IU/L (ref 0–32)
AST SERPL-CCNC: 36 IU/L (ref 0–40)
BASOPHILS # BLD AUTO: 0.1 X10E3/UL (ref 0–0.2)
BASOPHILS NFR BLD AUTO: 1 %
BILIRUB SERPL-MCNC: 0.8 MG/DL (ref 0–1.2)
BUN SERPL-MCNC: 30 MG/DL (ref 8–27)
BUN/CREAT SERPL: 20 (ref 12–28)
CALCIUM SERPL-MCNC: 10.3 MG/DL (ref 8.7–10.3)
CHLORIDE SERPL-SCNC: 103 MMOL/L (ref 96–106)
CHOLEST SERPL-MCNC: 189 MG/DL (ref 100–199)
CO2 SERPL-SCNC: 22 MMOL/L (ref 18–29)
CREAT SERPL-MCNC: 1.49 MG/DL (ref 0.57–1)
EOSINOPHIL # BLD AUTO: 0.4 X10E3/UL (ref 0–0.4)
EOSINOPHIL NFR BLD AUTO: 4 %
ERYTHROCYTE [DISTWIDTH] IN BLOOD BY AUTOMATED COUNT: 15.5 % (ref 12.3–15.4)
EST. AVERAGE GLUCOSE BLD GHB EST-MCNC: 134 MG/DL
GLOBULIN SER CALC-MCNC: 3.1 G/DL (ref 1.5–4.5)
GLUCOSE SERPL-MCNC: 132 MG/DL (ref 65–99)
HBA1C MFR BLD: 6.3 % (ref 4.8–5.6)
HCT VFR BLD AUTO: 38.9 % (ref 34–46.6)
HDLC SERPL-MCNC: 63 MG/DL
HGB BLD-MCNC: 12.8 G/DL (ref 11.1–15.9)
IMM GRANULOCYTES # BLD: 0 X10E3/UL (ref 0–0.1)
IMM GRANULOCYTES NFR BLD: 0 %
INTERPRETATION, 910389: NORMAL
INTERPRETATION: NORMAL
LDLC SERPL CALC-MCNC: 96 MG/DL (ref 0–99)
LYMPHOCYTES # BLD AUTO: 3.8 X10E3/UL (ref 0.7–3.1)
LYMPHOCYTES NFR BLD AUTO: 41 %
MCH RBC QN AUTO: 31.8 PG (ref 26.6–33)
MCHC RBC AUTO-ENTMCNC: 32.9 G/DL (ref 31.5–35.7)
MCV RBC AUTO: 97 FL (ref 79–97)
MONOCYTES # BLD AUTO: 0.8 X10E3/UL (ref 0.1–0.9)
MONOCYTES NFR BLD AUTO: 8 %
NEUTROPHILS # BLD AUTO: 4.3 X10E3/UL (ref 1.4–7)
NEUTROPHILS NFR BLD AUTO: 46 %
PDF IMAGE, 910387: NORMAL
PLATELET # BLD AUTO: 253 X10E3/UL (ref 150–379)
POTASSIUM SERPL-SCNC: 4.7 MMOL/L (ref 3.5–5.2)
PROT SERPL-MCNC: 7.5 G/DL (ref 6–8.5)
RBC # BLD AUTO: 4.02 X10E6/UL (ref 3.77–5.28)
SODIUM SERPL-SCNC: 142 MMOL/L (ref 134–144)
TRIGL SERPL-MCNC: 149 MG/DL (ref 0–149)
TSH SERPL DL<=0.005 MIU/L-ACNC: 2.92 UIU/ML (ref 0.45–4.5)
VLDLC SERPL CALC-MCNC: 30 MG/DL (ref 5–40)
WBC # BLD AUTO: 9.4 X10E3/UL (ref 3.4–10.8)

## 2017-09-05 RX ORDER — LEVOTHYROXINE SODIUM 50 UG/1
TABLET ORAL
Qty: 90 TAB | Refills: 3 | Status: SHIPPED | OUTPATIENT
Start: 2017-09-05 | End: 2018-07-21 | Stop reason: SDUPTHER

## 2017-09-05 RX ORDER — ALLOPURINOL 100 MG/1
TABLET ORAL
Qty: 90 TAB | Refills: 3 | Status: SHIPPED | OUTPATIENT
Start: 2017-09-05 | End: 2018-07-21 | Stop reason: SDUPTHER

## 2017-10-09 RX ORDER — FELODIPINE 10 MG/1
TABLET, EXTENDED RELEASE ORAL
Qty: 90 TAB | Refills: 4 | Status: SHIPPED | OUTPATIENT
Start: 2017-10-09 | End: 2018-03-05

## 2017-11-20 ENCOUNTER — TELEPHONE (OUTPATIENT)
Dept: FAMILY MEDICINE CLINIC | Age: 82
End: 2017-11-20

## 2017-11-20 NOTE — TELEPHONE ENCOUNTER
Patient called stating she was seen on 11/16/17 at [facility] [phone] for UTI. She was prescribed Cefdinir 300 mg. After starting it she has had diarrhea. She has not taken with food,probiotic or yogurt. She would like a different antibiotic. I have called to have C&S results faxed. Please advise.

## 2017-11-20 NOTE — TELEPHONE ENCOUNTER
Spoke with [facility] and they contacted [facility] and culture did show growth but sensitivity has not been completed. They will contact patient when resulted. Patient notified and will contact navigator tomorrow if she has not been contacted.

## 2017-12-18 RX ORDER — CARVEDILOL 3.12 MG/1
TABLET ORAL
Qty: 180 TAB | Refills: 3 | Status: SHIPPED | OUTPATIENT
Start: 2017-12-18 | End: 2019-01-05 | Stop reason: SDUPTHER

## 2018-03-05 ENCOUNTER — HOSPITAL ENCOUNTER (OUTPATIENT)
Dept: LAB | Age: 83
Discharge: HOME | End: 2018-03-05
Payer: MEDICARE

## 2018-03-05 ENCOUNTER — OFFICE VISIT (OUTPATIENT)
Dept: FAMILY MEDICINE CLINIC | Age: 83
End: 2018-03-05

## 2018-03-05 VITALS
HEART RATE: 73 BPM | RESPIRATION RATE: 17 BRPM | BODY MASS INDEX: 28.32 KG/M2 | HEIGHT: 65 IN | OXYGEN SATURATION: 95 % | DIASTOLIC BLOOD PRESSURE: 59 MMHG | WEIGHT: 170 LBS | SYSTOLIC BLOOD PRESSURE: 122 MMHG | TEMPERATURE: 97.9 F

## 2018-03-05 DIAGNOSIS — Z78.9 STATIN INTOLERANCE: ICD-10-CM

## 2018-03-05 DIAGNOSIS — E03.2 HYPOTHYROIDISM DUE TO NON-MEDICATION EXOGENOUS SUBSTANCES: ICD-10-CM

## 2018-03-05 DIAGNOSIS — Z00.00 MEDICARE ANNUAL WELLNESS VISIT, SUBSEQUENT: Primary | ICD-10-CM

## 2018-03-05 DIAGNOSIS — I10 ESSENTIAL HYPERTENSION: ICD-10-CM

## 2018-03-05 DIAGNOSIS — R73.02 GLUCOSE INTOLERANCE (IMPAIRED GLUCOSE TOLERANCE): ICD-10-CM

## 2018-03-05 DIAGNOSIS — K51.919 ULCERATIVE COLITIS WITH COMPLICATION, UNSPECIFIED LOCATION (HCC): ICD-10-CM

## 2018-03-05 DIAGNOSIS — E78.5 HYPERLIPIDEMIA, UNSPECIFIED HYPERLIPIDEMIA TYPE: ICD-10-CM

## 2018-03-05 DIAGNOSIS — N18.30 CHRONIC KIDNEY DISEASE (CKD), STAGE III (MODERATE) (HCC): ICD-10-CM

## 2018-03-05 DIAGNOSIS — R30.0 DYSURIA: ICD-10-CM

## 2018-03-05 DIAGNOSIS — N39.0 URINARY TRACT INFECTION WITHOUT HEMATURIA, SITE UNSPECIFIED: ICD-10-CM

## 2018-03-05 DIAGNOSIS — R06.09 DYSPNEA ON EXERTION: ICD-10-CM

## 2018-03-05 LAB
BILIRUB UR QL STRIP: NEGATIVE
GLUCOSE UR-MCNC: NEGATIVE MG/DL
KETONES P FAST UR STRIP-MCNC: NEGATIVE MG/DL
PH UR STRIP: 5.5 [PH] (ref 4.6–8)
PROT UR QL STRIP: NEGATIVE
SP GR UR STRIP: 1.01 (ref 1–1.03)
UA UROBILINOGEN AMB POC: NORMAL (ref 0.2–1)
URINALYSIS CLARITY POC: CLEAR
URINALYSIS COLOR POC: YELLOW
URINE BLOOD POC: NEGATIVE
URINE LEUKOCYTES POC: NORMAL
URINE NITRITES POC: NEGATIVE

## 2018-03-05 PROCEDURE — 80053 COMPREHEN METABOLIC PANEL: CPT

## 2018-03-05 PROCEDURE — 85025 COMPLETE CBC W/AUTO DIFF WBC: CPT

## 2018-03-05 PROCEDURE — 80061 LIPID PANEL: CPT

## 2018-03-05 PROCEDURE — 83036 HEMOGLOBIN GLYCOSYLATED A1C: CPT

## 2018-03-05 PROCEDURE — 36415 COLL VENOUS BLD VENIPUNCTURE: CPT

## 2018-03-05 PROCEDURE — 84443 ASSAY THYROID STIM HORMONE: CPT

## 2018-03-05 RX ORDER — FELODIPINE 5 MG/1
5 TABLET, EXTENDED RELEASE ORAL DAILY
Qty: 30 TAB | Refills: 0 | Status: SHIPPED | OUTPATIENT
Start: 2018-03-05 | End: 2018-03-19 | Stop reason: SDUPTHER

## 2018-03-05 NOTE — PROGRESS NOTES
This is a Subsequent Medicare Annual Wellness Exam (AWV) (Performed 12 months after IPPE or effective date of Medicare Part B enrollment)    I have reviewed the patient's medical history in detail and updated the computerized patient record. History     Past Medical History:   Diagnosis Date    Allergic rhinitis     asthma allergies    Arthritis     Chronic kidney disease (CKD), stage III (moderate)     GERD (gastroesophageal reflux disease)     Hyperlipidemia     Hypertension     Ill-defined condition     hard of hearing    Ulcerative colitis ([facility] [address] 10/28/2014      Past Surgical History:   Procedure Laterality Date    HX CATARACT REMOVAL Bilateral     HX COLONOSCOPY  9/14    ulcerative colitis    HX ORTHOPAEDIC      bilateral knee replacement     Current Outpatient Prescriptions   Medication Sig Dispense Refill    carvedilol (COREG) 3.125 mg tablet TAKE 1 TABLET TWICE A DAY WITH MEALS 180 Tab 3    felodipine (PLENDIL SR) 10 mg 24 hr tablet TAKE 1 TABLET DAILY 90 Tab 4    allopurinol (ZYLOPRIM) 100 mg tablet TAKE 1 TABLET DAILY 90 Tab 3    levothyroxine (SYNTHROID) 50 mcg tablet TAKE 1 TABLET DAILY BEFORE BREAKFAST 90 Tab 3    ergocalciferol (VITAMIN D2) 50,000 unit capsule TAKE ONE CAPSULE BY MOUTH ONCE WEEKLY 12 Cap 4    azelastine (ASTELIN) 137 mcg (0.1 %) nasal spray USE 1 SPRAY IN EACH NOSTRIL TWICE A DAY AS DIRECTED 3 Bottle 4    omega-3 fatty acids-vitamin e (FISH OIL) 1,000 mg cap Take 1 Cap by mouth.  UBIDECARENONE (COENZYME Q10) 100 mg tab Take  by mouth.  rosuvastatin (CRESTOR) 5 mg tablet Take 5 mg by mouth nightly. Taking twice a week Mon and Fri      CHLORPHENIRAMINE MALEATE (CHLOR-TRIMETON PO) Take 4 mg by mouth every four (4) hours as needed.  ranitidine hcl (ZANTAC) 150 mg capsule Take 150 mg by mouth as needed.        Allergies   Allergen Reactions    Cefdinir Diarrhea    Penicillin G Rash    Statins-Hmg-Coa Reductase Inhibitors Other (comments)     arthralgia Family History   Problem Relation Age of Onset    Diabetes Daughter      Social History   Substance Use Topics    Smoking status: Never Smoker    Smokeless tobacco: Never Used    Alcohol use No     Patient Active Problem List   Diagnosis Code    Hyperlipidemia E78.5    GERD (gastroesophageal reflux disease) K21.9    Hypertension I10    Allergic rhinitis J30.9    Chronic kidney disease (CKD), stage III (moderate) N18.3    Hypothyroid E03.9    Vitamin D deficiency E55.9    Ulcerative colitis ([facility] [address] K51.90    Statin intolerance Z78.9    Glucose intolerance (impaired glucose tolerance) R73.02       Depression Risk Factor Screening:     PHQ over the last two weeks 3/5/2018   Little interest or pleasure in doing things Not at all   Feeling down, depressed or hopeless Not at all   Total Score PHQ 2 0   Trouble falling or staying asleep, or sleeping too much -   Feeling tired or having little energy -   Poor appetite or overeating -   Feeling bad about yourself - or that you are a failure or have let yourself or your family down -   Trouble concentrating on things such as school, work, reading or watching TV -   Moving or speaking so slowly that other people could have noticed; or the opposite being so fidgety that others notice -   Thoughts of being better off dead, or hurting yourself in some way -   PHQ 9 Score -   How difficult have these problems made it for you to do your work, take care of your home and get along with others -     Alcohol Risk Factor Screening: You do not drink alcohol or very rarely. Functional Ability and Level of Safety:   Hearing Loss  Hearing aids in place    Activities of Daily Living  The home contains: handrails  Patient does total self care    Fall Risk  Fall Risk Assessment, last 12 mths 3/5/2018   Able to walk? Yes   Fall in past 12 months? Yes   Fall with injury?  Yes   Number of falls in past 12 months 1   Fall Risk Score 2   fell onto outstretched right wrist and had fracture. Abuse Screen  Patient is not abused    Cognitive Screening   Evaluation of Cognitive Function:  Has your family/caregiver stated any concerns about your memory: no      Patient Care Team   Patient Care Team:  Carmina Landers MD as PCP - General (Internal Medicine)    Assessment/Plan   Education and counseling provided:  Are appropriate based on today's review and evaluation  End-of-Life planning (with patient's consent)  Pneumococcal Vaccine  Influenza Vaccine  Colorectal cancer screening tests    Diagnoses and all orders for this visit:    1. Medicare annual wellness visit, subsequent      Health Maintenance Due   Topic Date Due    MEDICARE YEARLY EXAM  02/04/2018    GLAUCOMA SCREENING Q2Y  05/03/2018         HPI:  Philip Siddiqui also presents for follow up of chronic conditions. Patient Active Problem List    Diagnosis    Glucose intolerance (impaired glucose tolerance) -  No polyuria/polydipsia. No unexplained weight loss. No change in vision. No tingling or numbness in feet.  Ulcerative colitis ([address]     3-4 formed BMs each morning. Refuses further colonoscopy or treatment.  Statin intolerance    Vitamin D deficiency    Hypothyroid - No hair loss, no constipation, no dry skin or brittle nails, no palpitations. Taking medication each morning on an empty stomach.  Hyperlipidemia -s tatin intolerant    GERD (gastroesophageal reflux disease)    Hypertension - No home monitoring. Compliant with medication. Gets shortness of breath with exertion. No chest pain, no vision change, no headache, no lower extremity edema. No PND, no orthopnea. No syncopal episodes. Saw Dr Smiley Person in the remote past but never required intervention. Sometimes gets \"staggery\". Never any associated chest heaviness or pain.     Allergic rhinitis - taking flonase, zyrtec or allegra    Chronic kidney disease (CKD), stage III (moderate)       See Past Medical History, Surgical History, Social History, Medications, and Allergies documented above. ROS:  ROS negative except as per HPI. PE:  Visit Vitals    /59 (BP 1 Location: Left arm, BP Patient Position: Sitting)    Pulse 73    Temp 97.9 °F (36.6 °C) (Oral)    Resp 17    Ht 5' 5\" (1.651 m)    Wt 170 lb (77.1 kg)    SpO2 95%    BMI 28.29 kg/m2     Gen: alert, oriented, no acute distress  Head: normocephalic, atraumatic  Ears: external auditory canals clear, TMs without erythema or effusion  Eyes: pupils equal round reactive to light, sclera clear, conjunctiva clear  Oral: moist mucus membranes, no oral lesions, no pharyngeal inflammation or exudate  Neck: symmetric normal sized thyroid, no carotid bruits, no jugular vein distention  Resp: no increase work of breathing, lungs clear to ausculation bilaterally, no wheezing, rales or rhonchi  CV: S1, S2 normal.  No murmurs, rubs, or gallops. Abd: soft, not tender, not distended. No hepatosplenomegaly. Normal bowel sounds. Neuro: cranial nerves intact, normal strength and movement in all extremities, reflexes and sensation intact and symmetric. Skin: no lesion or rash  Extremities: no cyanosis or edema. Slight swelling right wrist.      Assessment/Plan:    2. Dyspnea on exertion  Could be anemia/metabolic vs pulmonary vs cardiac.  BP is a little low, decrease felodipine from 10 to 5mg daily. 3. Dysuria  Repeat UA today. Lots of frequency at night    4. Ulcerative colitis with complication, unspecified location [facility])  Patient refuses further intervention    5. Glucose intolerance (impaired glucose tolerance)  No changes in medications pending lab results.  - HEMOGLOBIN A1C WITH EAG    6. Hypothyroidism due to non-medication exogenous substances  No changes in medications pending lab results.  - TSH 3RD GENERATION    7. Hyperlipidemia, unspecified hyperlipidemia type  No changes in medications pending lab results. - METABOLIC PANEL, COMPREHENSIVE  - LIPID PANEL    8.  Essential hypertension  Decrease felodipine from 10 to 5mg.  - CBC WITH AUTOMATED DIFF    9. Statin intolerance    10. Chronic kidney disease (CKD), stage III (moderate)  Not on ACE or ARB at this time, given BP will not start. Orders Placed This Encounter    CBC WITH AUTOMATED DIFF    METABOLIC PANEL, COMPREHENSIVE    LIPID PANEL    HEMOGLOBIN A1C WITH EAG    TSH 3RD GENERATION       There are no discontinued medications. Current Outpatient Prescriptions   Medication Sig Dispense Refill    carvedilol (COREG) 3.125 mg tablet TAKE 1 TABLET TWICE A DAY WITH MEALS 180 Tab 3    felodipine (PLENDIL SR) 10 mg 24 hr tablet TAKE 1 TABLET DAILY 90 Tab 4    allopurinol (ZYLOPRIM) 100 mg tablet TAKE 1 TABLET DAILY 90 Tab 3    levothyroxine (SYNTHROID) 50 mcg tablet TAKE 1 TABLET DAILY BEFORE BREAKFAST 90 Tab 3    ergocalciferol (VITAMIN D2) 50,000 unit capsule TAKE ONE CAPSULE BY MOUTH ONCE WEEKLY 12 Cap 4    azelastine (ASTELIN) 137 mcg (0.1 %) nasal spray USE 1 SPRAY IN EACH NOSTRIL TWICE A DAY AS DIRECTED 3 Bottle 4    omega-3 fatty acids-vitamin e (FISH OIL) 1,000 mg cap Take 1 Cap by mouth.  UBIDECARENONE (COENZYME Q10) 100 mg tab Take  by mouth.  rosuvastatin (CRESTOR) 5 mg tablet Take 5 mg by mouth nightly. Taking twice a week Mon and Fri      CHLORPHENIRAMINE MALEATE (CHLOR-TRIMETON PO) Take 4 mg by mouth every four (4) hours as needed.  ranitidine hcl (ZANTAC) 150 mg capsule Take 150 mg by mouth as needed. Recommended healthy diet low in carbohydrates, fats, sodium and cholesterol. Recommended regular cardiovascular exercise 3-6 times per week for 30-60 minutes daily. Verbal and written instructions (see AVS) provided. Patient expresses understanding of diagnosis and treatment plan.

## 2018-03-05 NOTE — PROGRESS NOTES
Chief Complaint   Patient presents with   [place] Maintenance reviewed     1. Have you been to the ER, urgent care clinic since your last visit? Hospitalized since your last visit? Yes, [facility] in Sept 2017 for UTI and [facility] in Sept 2017 for fall and broken wrist     2. Have you seen or consulted any other health care providers outside of the [address] [place] [address] since your last visit? Include any pap smears or colon screening.  [facility] Sept 2017 for broken wrist

## 2018-03-05 NOTE — PATIENT INSTRUCTIONS
The best way to stay healthy is to live a healthy lifestyle. A healthy lifestyle includes regular exercise, eating a well-balanced diet, keeping a healthy weight and not smoking. Regular physical exams and screening tests are another important way to take care of yourself. Preventive exams provided by health care providers can find health problems early when treatment works best and can keep you from getting certain diseases or illnesses. Preventive services include exams, lab tests, screenings, shots, monitoring and information to help you take care of your own health. All people over 65 should have a pneumonia shot. Pneumonia shots are usually only needed once in a lifetime unless your doctor decides differently. In addition to your physical exam, some screening tests are recommended:    All people over 65 should have a yearly flu shot. People over 65 are at medium to high risk for Hepatitis B. Three shots are needed for complete protection. Bone mass measurement (dexa scan) is recommended every two years. Diabetes Mellitus screening is recommended every year. Glaucoma is an eye disease caused by high pressure in the eye. An eye exam is recommended every year. Cardiovascular screening tests that check your cholesterol and other blood fat (lipid) levels are recommended every five years. Colorectal Cancer screening tests help to find pre-cancerous polyps (growths in the colon) so they can be removed before they turn into cancer. Tests ordered for screening depend on your personal and family history risk factors. Prostate Cancer Screening (annually up to age 76)    Screening for breast cancer is recommended yearly with a Mammogram.    Screening for cervical and vaginal cancer is recommended with a pelvic and Pap test every two years.  However if you have had an abnormal pap in the past  three years or at high risk for cervical or vaginal cancer Medicare will cover a pap test and a pelvic exam every year.      Here is a list of your current Health Maintenance items with a due date:  Health Maintenance Due   Topic Date Due    Up to date

## 2018-03-05 NOTE — MR AVS SNAPSHOT
[facility] 
 
 
 [address] [place] [address] Saud Hendrix [facility] 
[phone] Patient: Renzo Claire MRN:  [id number] Visit Information Date & Time Provider Department Dept. Phone Encounter #  
 3/5/2018  9:30 AM [facility] [phone] [id number] Upcoming Health Maintenance Date Due  
 MEDICARE YEARLY EXAM 2018 GLAUCOMA SCREENING Q2Y 3/5/2019 DTaP/Tdap/Td series (2 - Td) 2/3/2027 Allergies as of 3/5/2018  Review Complete On: 3/5/2018 By: Fady Villagomez MD  
  
 Severity Noted Reaction Type Reactions Cefdinir High 2018    Diarrhea Penicillin G High 2010    Rash Statins-hmg-coa Reductase Inhibitors  10/28/2014    Other (comments) arthralgia Current Immunizations  Reviewed on 3/28/2017 Name Date Influenza High Dose Vaccine PF 10/6/2017 12:00 AM, 9/15/2016, 2015 Influenza Vaccine 2014 Pneumococcal Conjugate (PCV-13) 2014 Pneumococcal Polysaccharide (PPSV-23) 2013 Zoster Vaccine, Live 2014 Not reviewed this visit You Were Diagnosed With   
  
 Codes Comments Medicare annual wellness visit, subsequent    -  Primary ICD-10-CM: Z00.00 ICD-9-CM: V70.0 Dyspnea on exertion     ICD-10-CM: R06.09 
ICD-9-CM: 786.09 Dysuria     ICD-10-CM: R30.0 ICD-9-CM: 788.1 Ulcerative colitis with complication, unspecified location [facility])     ICD-10-CM: T26.860 ICD-9-CM: 556.9 Glucose intolerance (impaired glucose tolerance)     ICD-10-CM: R73.02 
ICD-9-CM: 790.22 Hypothyroidism due to non-medication exogenous substances     ICD-10-CM: E03.2 ICD-9-CM: 244.3 Hyperlipidemia, unspecified hyperlipidemia type     ICD-10-CM: E78.5 ICD-9-CM: 272.4 Essential hypertension     ICD-10-CM: I10 
ICD-9-CM: 401.9 Statin intolerance     ICD-10-CM: Z78.9 ICD-9-CM: 995.27   
 Chronic kidney disease (CKD), stage III (moderate)     ICD-10-CM: N18.3 ICD-9-CM: 470. 3 Vitals BP Pulse Temp Resp Height(growth percentile) Weight(growth percentile) 122/59 (BP 1 Location: Left arm, BP Patient Position: Sitting) 73 97.9 °F (36.6 °C) (Oral) 17 5' 5\" (1.651 m) 170 lb (77.1 kg) SpO2 BMI OB Status Smoking Status 95% 28.29 kg/m2 Postmenopausal Never Smoker BMI and BSA Data Body Mass Index Body Surface Area  
 28.29 kg/m 2 1.88 m 2 Preferred Pharmacy Pharmacy Name Phone [facility] [address], [address] [place] [address] [phone] Your Updated Medication List  
  
   
This list is accurate as of 3/5/18 10:31 AM.  Always use your most recent med list.  
  
  
  
  
 allopurinol 100 mg tablet Commonly known as:  ZYLOPRIM  
TAKE 1 TABLET DAILY  
  
 azelastine 137 mcg (0.1 %) nasal spray Commonly known as:  ASTELIN  
USE 1 SPRAY IN EACH NOSTRIL TWICE A DAY AS DIRECTED  
  
 carvedilol 3.125 mg tablet Commonly known as:  COREG  
TAKE 1 TABLET TWICE A DAY WITH MEALS  
  
 CHLOR-TRIMETON PO Take 4 mg by mouth every four (4) hours as needed. coenzyme Q10 100 mg Tab Take  by mouth. CRESTOR 5 mg tablet Generic drug:  rosuvastatin Take 5 mg by mouth nightly. Taking twice a week Mon and Fri  
  
 ergocalciferol 50,000 unit capsule Commonly known as:  VITAMIN D2  
TAKE ONE CAPSULE BY MOUTH ONCE WEEKLY  
  
 felodipine 5 mg 24 hr tablet Commonly known as:  PLENDIL SR Take 1 Tab by mouth daily. FISH OIL 1,000 mg Cap Generic drug:  omega-3 fatty acids-vitamin e Take 1 Cap by mouth.  
  
 levothyroxine 50 mcg tablet Commonly known as:  SYNTHROID  
TAKE 1 TABLET DAILY BEFORE BREAKFAST  
  
 ZANTAC 150 mg capsule Generic drug:  raNITIdine hcl Take 150 mg by mouth as needed. Prescriptions Sent to Pharmacy  Refills  
 felodipine (PLENDIL SR) 5 mg 24 hr tablet 0  
 Sig: Take 1 Tab by mouth daily. Class: Normal  
 Pharmacy: [facility] [address] [place], [address]  [address] Ph #: [phone] Route: Oral  
  
We Performed the Following CBC WITH AUTOMATED DIFF [99570 CPT(R)] HEMOGLOBIN A1C WITH EAG [25289 CPT(R)] LIPID PANEL [24281 CPT(R)] METABOLIC PANEL, COMPREHENSIVE [25114 CPT(R)] TSH 3RD GENERATION [57668 CPT(R)] To-Do List   
 03/05/2018 ECHO:  2D ECHO COMPLETE ADULT (TTE) W OR WO CONTR Patient Instructions The best way to stay healthy is to live a healthy lifestyle. A healthy lifestyle includes regular exercise, eating a well-balanced diet, keeping a healthy weight and not smoking. Regular physical exams and screening tests are another important way to take care of yourself. Preventive exams provided by health care providers can find health problems early when treatment works best and can keep you from getting certain diseases or illnesses. Preventive services include exams, lab tests, screenings, shots, monitoring and information to help you take care of your own health. All people over 65 should have a pneumonia shot. Pneumonia shots are usually only needed once in a lifetime unless your doctor decides differently. In addition to your physical exam, some screening tests are recommended: 
 
All people over 65 should have a yearly flu shot. People over 65 are at medium to high risk for Hepatitis B. Three shots are needed for complete protection. Bone mass measurement (dexa scan) is recommended every two years. Diabetes Mellitus screening is recommended every year. Glaucoma is an eye disease caused by high pressure in the eye. An eye exam is recommended every year. Cardiovascular screening tests that check your cholesterol and other blood fat (lipid) levels are recommended every five years.   
 
Colorectal Cancer screening tests help to find pre-cancerous polyps (growths in the colon) so they can be removed before they turn into cancer. Tests ordered for screening depend on your personal and family history risk factors. Prostate Cancer Screening (annually up to age 76) Screening for breast cancer is recommended yearly with a Mammogram. 
 
Screening for cervical and vaginal cancer is recommended with a pelvic and Pap test every two years. However if you have had an abnormal pap in the past  three years or at high risk for cervical or vaginal cancer Medicare will cover a pap test and a pelvic exam every year. Here is a list of your current Health Maintenance items with a due date: 
Health Maintenance Due Topic Date Due  
 Annual Well Visit  02/04/2018 Introducing [facility]! Jerald Moise introduces [employer] patient portal. Now you can access parts of your medical record, email your doctor's office, and request medication refills online. 1. In your internet browser, go to [email or web address] 2. Click on the First Time User? Click Here link in the Sign In box. You will see the New Member Sign Up page. 3. Enter your [employer] Access Code exactly as it appears below. You will not need to use this code after youve completed the sign-up process. If you do not sign up before the expiration date, you must request a new code. · [employer] Access Code: [email or web address] Expires: 6/3/2018 10:14 AM 
 
4. Enter the last four digits of your Social Security Number (xxxx) and Date of Birth (mm/dd/yyyy) as indicated and click Submit. You will be taken to the next sign-up page. 5. Create a [employer] ID. This will be your [employer] login ID and cannot be changed, so think of one that is secure and easy to remember. 6. Create a [employer] password. You can change your password at any time. 7. Enter your Password Reset Question and Answer. This can be used at a later time if you forget your password. 8. Enter your e-mail address. You will receive e-mail notification when new information is available in [address] 9. Click Sign Up. You can now view and download portions of your medical record. 10. Click the Download Summary menu link to download a portable copy of your medical information. If you have questions, please visit the Frequently Asked Questions section of the [employer] website. Remember, [employer] is NOT to be used for urgent needs. For medical emergencies, dial 911. Now available from your iPhone and Android! Please provide this summary of care documentation to your next provider. Your primary care clinician is listed as Dore Bernheim. If you have any questions after today's visit, please call [phone].

## 2018-03-06 LAB
ALBUMIN SERPL-MCNC: 4.2 G/DL (ref 3.5–4.7)
ALBUMIN/GLOB SERPL: 1.4 {RATIO} (ref 1.2–2.2)
ALP SERPL-CCNC: 77 IU/L (ref 39–117)
ALT SERPL-CCNC: 33 IU/L (ref 0–32)
AST SERPL-CCNC: 39 IU/L (ref 0–40)
BACTERIA UR CULT: NORMAL
BASOPHILS # BLD AUTO: 0.1 X10E3/UL (ref 0–0.2)
BASOPHILS NFR BLD AUTO: 1 %
BILIRUB SERPL-MCNC: 0.6 MG/DL (ref 0–1.2)
BUN SERPL-MCNC: 27 MG/DL (ref 8–27)
BUN/CREAT SERPL: 21 (ref 12–28)
CALCIUM SERPL-MCNC: 9.8 MG/DL (ref 8.7–10.3)
CHLORIDE SERPL-SCNC: 104 MMOL/L (ref 96–106)
CHOLEST SERPL-MCNC: 189 MG/DL (ref 100–199)
CO2 SERPL-SCNC: 23 MMOL/L (ref 18–29)
CREAT SERPL-MCNC: 1.29 MG/DL (ref 0.57–1)
EOSINOPHIL # BLD AUTO: 0.3 X10E3/UL (ref 0–0.4)
EOSINOPHIL NFR BLD AUTO: 4 %
ERYTHROCYTE [DISTWIDTH] IN BLOOD BY AUTOMATED COUNT: 14.7 % (ref 12.3–15.4)
EST. AVERAGE GLUCOSE BLD GHB EST-MCNC: 123 MG/DL
GFR SERPLBLD CREATININE-BSD FMLA CKD-EPI: 39 ML/MIN/1.73
GFR SERPLBLD CREATININE-BSD FMLA CKD-EPI: 45 ML/MIN/1.73
GLOBULIN SER CALC-MCNC: 3 G/DL (ref 1.5–4.5)
GLUCOSE SERPL-MCNC: 116 MG/DL (ref 65–99)
HBA1C MFR BLD: 5.9 % (ref 4.8–5.6)
HCT VFR BLD AUTO: 37.3 % (ref 34–46.6)
HDLC SERPL-MCNC: 77 MG/DL
HGB BLD-MCNC: 12.3 G/DL (ref 11.1–15.9)
IMM GRANULOCYTES # BLD: 0 X10E3/UL (ref 0–0.1)
IMM GRANULOCYTES NFR BLD: 0 %
INTERPRETATION, 910389: NORMAL
INTERPRETATION: NORMAL
LDLC SERPL CALC-MCNC: 88 MG/DL (ref 0–99)
LYMPHOCYTES # BLD AUTO: 3.9 X10E3/UL (ref 0.7–3.1)
LYMPHOCYTES NFR BLD AUTO: 42 %
MCH RBC QN AUTO: 32.1 PG (ref 26.6–33)
MCHC RBC AUTO-ENTMCNC: 33 G/DL (ref 31.5–35.7)
MCV RBC AUTO: 97 FL (ref 79–97)
MONOCYTES # BLD AUTO: 0.6 X10E3/UL (ref 0.1–0.9)
MONOCYTES NFR BLD AUTO: 7 %
NEUTROPHILS # BLD AUTO: 4.3 X10E3/UL (ref 1.4–7)
NEUTROPHILS NFR BLD AUTO: 46 %
PDF IMAGE, 910387: NORMAL
PLATELET # BLD AUTO: 230 X10E3/UL (ref 150–379)
POTASSIUM SERPL-SCNC: 4.7 MMOL/L (ref 3.5–5.2)
PROT SERPL-MCNC: 7.2 G/DL (ref 6–8.5)
RBC # BLD AUTO: 3.83 X10E6/UL (ref 3.77–5.28)
SODIUM SERPL-SCNC: 142 MMOL/L (ref 134–144)
TRIGL SERPL-MCNC: 122 MG/DL (ref 0–149)
TSH SERPL DL<=0.005 MIU/L-ACNC: 2.97 UIU/ML (ref 0.45–4.5)
VLDLC SERPL CALC-MCNC: 24 MG/DL (ref 5–40)
WBC # BLD AUTO: 9.2 X10E3/UL (ref 3.4–10.8)

## 2018-03-07 NOTE — PROGRESS NOTES
Ms. Maribel Aguillon notified Dr. Radha Hernandez said Labs look good. No change in medications.  She voiced understanding

## 2018-03-17 DIAGNOSIS — J30.9 ALLERGIC RHINITIS: ICD-10-CM

## 2018-03-18 RX ORDER — AZELASTINE 1 MG/ML
SPRAY, METERED NASAL
Qty: 1 BOTTLE | Refills: 11 | Status: SHIPPED | OUTPATIENT
Start: 2018-03-18 | End: 2020-01-27 | Stop reason: SDUPTHER

## 2018-03-19 RX ORDER — FELODIPINE 5 MG/1
5 TABLET, EXTENDED RELEASE ORAL DAILY
Qty: 90 TAB | Refills: 3 | Status: SHIPPED | OUTPATIENT
Start: 2018-03-19 | End: 2019-03-22 | Stop reason: SDUPTHER

## 2018-03-22 ENCOUNTER — HOSPITAL ENCOUNTER (OUTPATIENT)
Dept: NON INVASIVE DIAGNOSTICS | Age: 83
Discharge: HOME | End: 2018-03-22
Attending: INTERNAL MEDICINE
Payer: MEDICARE

## 2018-03-22 DIAGNOSIS — R06.09 DYSPNEA ON EXERTION: ICD-10-CM

## 2018-03-22 PROCEDURE — 93306 TTE W/DOPPLER COMPLETE: CPT

## 2018-07-21 RX ORDER — LEVOTHYROXINE SODIUM 50 UG/1
TABLET ORAL
Qty: 90 TAB | Refills: 3 | Status: SHIPPED | OUTPATIENT
Start: 2018-07-21 | End: 2019-08-05 | Stop reason: SDUPTHER

## 2018-07-21 RX ORDER — ALLOPURINOL 100 MG/1
TABLET ORAL
Qty: 90 TAB | Refills: 3 | Status: SHIPPED | OUTPATIENT
Start: 2018-07-21 | End: 2019-08-05 | Stop reason: SDUPTHER

## 2018-09-04 ENCOUNTER — OFFICE VISIT (OUTPATIENT)
Dept: FAMILY MEDICINE CLINIC | Age: 83
End: 2018-09-04

## 2018-09-04 ENCOUNTER — HOSPITAL ENCOUNTER (OUTPATIENT)
Dept: LAB | Age: 83
Discharge: HOME | End: 2018-09-04
Payer: MEDICARE

## 2018-09-04 VITALS
SYSTOLIC BLOOD PRESSURE: 101 MMHG | RESPIRATION RATE: 20 BRPM | HEIGHT: 65 IN | TEMPERATURE: 97.6 F | OXYGEN SATURATION: 96 % | WEIGHT: 165 LBS | DIASTOLIC BLOOD PRESSURE: 64 MMHG | HEART RATE: 68 BPM | BODY MASS INDEX: 27.49 KG/M2

## 2018-09-04 DIAGNOSIS — N18.30 CHRONIC KIDNEY DISEASE (CKD), STAGE III (MODERATE) (HCC): ICD-10-CM

## 2018-09-04 DIAGNOSIS — E55.9 VITAMIN D DEFICIENCY: ICD-10-CM

## 2018-09-04 DIAGNOSIS — E03.2 HYPOTHYROIDISM DUE TO NON-MEDICATION EXOGENOUS SUBSTANCES: ICD-10-CM

## 2018-09-04 DIAGNOSIS — Z23 ENCOUNTER FOR IMMUNIZATION: ICD-10-CM

## 2018-09-04 DIAGNOSIS — E78.5 HYPERLIPIDEMIA, UNSPECIFIED HYPERLIPIDEMIA TYPE: ICD-10-CM

## 2018-09-04 DIAGNOSIS — R73.02 GLUCOSE INTOLERANCE (IMPAIRED GLUCOSE TOLERANCE): Primary | ICD-10-CM

## 2018-09-04 DIAGNOSIS — I10 ESSENTIAL HYPERTENSION: ICD-10-CM

## 2018-09-04 DIAGNOSIS — M10.9 GOUT, UNSPECIFIED CAUSE, UNSPECIFIED CHRONICITY, UNSPECIFIED SITE: ICD-10-CM

## 2018-09-04 DIAGNOSIS — Z78.9 STATIN INTOLERANCE: ICD-10-CM

## 2018-09-04 DIAGNOSIS — K51.919 ULCERATIVE COLITIS WITH COMPLICATION, UNSPECIFIED LOCATION (HCC): ICD-10-CM

## 2018-09-04 PROCEDURE — 80053 COMPREHEN METABOLIC PANEL: CPT

## 2018-09-04 PROCEDURE — 80061 LIPID PANEL: CPT

## 2018-09-04 PROCEDURE — 85025 COMPLETE CBC W/AUTO DIFF WBC: CPT

## 2018-09-04 PROCEDURE — 36415 COLL VENOUS BLD VENIPUNCTURE: CPT

## 2018-09-04 PROCEDURE — 84443 ASSAY THYROID STIM HORMONE: CPT

## 2018-09-04 PROCEDURE — 83036 HEMOGLOBIN GLYCOSYLATED A1C: CPT

## 2018-09-04 PROCEDURE — 82306 VITAMIN D 25 HYDROXY: CPT

## 2018-09-04 NOTE — MR AVS SNAPSHOT
[address] [facility] 
 
 
 [address] [address] [facility] 
[phone] Patient: Alexander Romeo MRN:  [id number] Visit Information Date & Time Provider Department Dept. Phone Encounter #  
 2018  9:00 AM [facility] [phone] [id number] Follow-up Instructions Return in about 6 months (around 3/4/2019). Upcoming Health Maintenance Date Due Influenza Age 5 to Adult 2018 GLAUCOMA SCREENING Q2Y 3/5/2019 MEDICARE YEARLY EXAM 3/6/2019 DTaP/Tdap/Td series (2 - Td) 2/3/2027 Allergies as of 2018  Review Complete On: 2018 By: Raciel Lenz MD  
  
 Severity Noted Reaction Type Reactions Cefdinir High 2018    Diarrhea Penicillin G High 2010    Rash Statins-hmg-coa Reductase Inhibitors  10/28/2014    Other (comments) arthralgia Current Immunizations  Reviewed on 3/28/2017 Name Date Influenza High Dose Vaccine PF 10/6/2017 12:00 AM, 9/15/2016, 2015 Influenza Vaccine 2014 Influenza Vaccine (Tri) Adjuvanted  Incomplete Pneumococcal Conjugate (PCV-13) 2014 Pneumococcal Polysaccharide (PPSV-23) 2013 Zoster Vaccine, Live 2014 Not reviewed this visit You Were Diagnosed With   
  
 Codes Comments Glucose intolerance (impaired glucose tolerance)    -  Primary ICD-10-CM: R73.02 
ICD-9-CM: 790.22 Hypothyroidism due to non-medication exogenous substances     ICD-10-CM: E03.2 ICD-9-CM: 927. 3 Chronic kidney disease (CKD), stage III (moderate)     ICD-10-CM: N18.3 ICD-9-CM: 659. 3 Statin intolerance     ICD-10-CM: Z78.9 ICD-9-CM: 995.27 Ulcerative colitis with complication, unspecified location [facility])     ICD-10-CM: G97.243 ICD-9-CM: 556.9 Essential hypertension     ICD-10-CM: I10 
ICD-9-CM: 401.9 Hyperlipidemia, unspecified hyperlipidemia type     ICD-10-CM: E78.5 ICD-9-CM: 272.4 Vitamin D deficiency     ICD-10-CM: E55.9 ICD-9-CM: 268.9 Gout, unspecified cause, unspecified chronicity, unspecified site     ICD-10-CM: M10.9 ICD-9-CM: 274.9 Encounter for immunization     ICD-10-CM: I64 ICD-9-CM: V03.89 Vitals BP Pulse Temp Resp Height(growth percentile) Weight(growth percentile) 101/64 (BP 1 Location: Right arm, BP Patient Position: Sitting) 68 97.6 °F (36.4 °C) (Oral) 20 5' 5\" (1.651 m) 165 lb (74.8 kg) SpO2 BMI OB Status Smoking Status 96% 27.46 kg/m2 Postmenopausal Never Smoker Vitals History BMI and BSA Data Body Mass Index Body Surface Area  
 27.46 kg/m 2 1.85 m 2 Preferred Pharmacy Pharmacy Name Phone Darryl Vines, [facility] [phone] Your Updated Medication List  
  
   
This list is accurate as of 9/4/18  9:40 AM.  Always use your most recent med list.  
  
  
  
  
 allopurinol 100 mg tablet Commonly known as:  ZYLOPRIM  
TAKE 1 TABLET DAILY  
  
 azelastine 137 mcg (0.1 %) nasal spray Commonly known as:  ASTELIN  
USE 1 SPRAY IN EACH NOSTRIL TWICE A DAY AS DIRECTED  
  
 carvedilol 3.125 mg tablet Commonly known as:  COREG  
TAKE 1 TABLET TWICE A DAY WITH MEALS  
  
 coenzyme Q10 100 mg Tab Take  by mouth. CRESTOR 5 mg tablet Generic drug:  rosuvastatin Take 5 mg by mouth nightly. Taking twice a week Mon and Fri  
  
 ergocalciferol 50,000 unit capsule Commonly known as:  VITAMIN D2  
TAKE ONE CAPSULE BY MOUTH ONCE WEEKLY  
  
 felodipine 5 mg 24 hr tablet Commonly known as:  PLENDIL SR Take 1 Tab by mouth daily. FISH OIL 1,000 mg Cap Generic drug:  omega-3 fatty acids-vitamin e Take 1 Cap by mouth.  
  
 levothyroxine 50 mcg tablet Commonly known as:  SYNTHROID  
TAKE 1 TABLET DAILY BEFORE BREAKFAST  
  
 ZANTAC 150 mg capsule Generic drug:  raNITIdine hcl Take 150 mg by mouth as needed. We Performed the Following ADMIN INFLUENZA VIRUS VAC [ [facility]] CBC WITH AUTOMATED DIFF [74118 CPT(R)] HEMOGLOBIN A1C WITH EAG [06489 CPT(R)] INFLUENZA VACCINE INACTIVATED (IIV), SUBUNIT, ADJUVANTED, IM [id number] CPT(R)] LIPID PANEL [84306 CPT(R)] METABOLIC PANEL, COMPREHENSIVE [23190 CPT(R)] TSH 3RD GENERATION [34631 CPT(R)] VITAMIN D, 25 HYDROXY [id number] CPT(R)] Follow-up Instructions Return in about 6 months (around 3/4/2019). Patient Instructions Vaccine Information Statement Influenza (Flu) Vaccine (Inactivated or Recombinant): What you need to know Many Vaccine Information Statements are available in Citizen of [place] and other languages. See www.immunize.org/vis Hojas de Información Sobre Vacunas están disponibles en Español y en muchos otros idiomas. Visite www.immunize.org/vis 1. Why get vaccinated? Influenza (flu) is a contagious disease that spreads around the [place] every year, usually between October and May. Flu is caused by influenza viruses, and is spread mainly by coughing, sneezing, and close contact. Anyone can get flu. Flu strikes suddenly and can last several days. Symptoms vary by age, but can include: 
 fever/chills  sore throat  muscle aches  fatigue  cough  headache  runny or stuffy nose Flu can also lead to pneumonia and blood infections, and cause diarrhea and seizures in children. If you have a medical condition, such as heart or lung disease, flu can make it worse. Flu is more dangerous for some people. Infants and young children, people 72years of age and older, pregnant women, and people with certain health conditions or a weakened immune system are at greatest risk. Each year thousands of people in the [facility] die from flu, and many more are hospitalized.   
 
Flu vaccine can: 
 keep you from getting flu, 
 make flu less severe if you do get it, and 
  keep you from spreading flu to your family and other people. 2. Inactivated and recombinant flu vaccines A dose of flu vaccine is recommended every flu season. Children 6 months through 6years of age may need two doses during the same flu season. Everyone else needs only one dose each flu season. Some inactivated flu vaccines contain a very small amount of a mercury-based preservative called thimerosal. Studies have not shown thimerosal in vaccines to be harmful, but flu vaccines that do not contain thimerosal are available. There is no live flu virus in flu shots. They cannot cause the flu. There are many flu viruses, and they are always changing. Each year a new flu vaccine is made to protect against three or four viruses that are likely to cause disease in the upcoming flu season. But even when the vaccine doesnt exactly match these viruses, it may still provide some protection Flu vaccine cannot prevent: 
 flu that is caused by a virus not covered by the vaccine, or 
 illnesses that look like flu but are not. It takes about 2 weeks for protection to develop after vaccination, and protection lasts through the flu season. 3. Some people should not get this vaccine Tell the person who is giving you the vaccine:  If you have any severe, life-threatening allergies. If you ever had a life-threatening allergic reaction after a dose of flu vaccine, or have a severe allergy to any part of this vaccine, you may be advised not to get vaccinated. Most, but not all, types of flu vaccine contain a small amount of egg protein.  If you ever had Guillain-Barré Syndrome (also called GBS). Some people with a history of GBS should not get this vaccine. This should be discussed with your doctor.  If you are not feeling well. It is usually okay to get flu vaccine when you have a mild illness, but you might be asked to come back when you feel better. 4. Risks of a vaccine reaction With any medicine, including vaccines, there is a chance of reactions. These are usually mild and go away on their own, but serious reactions are also possible. Most people who get a flu shot do not have any problems with it. Minor problems following a flu shot include:  
 soreness, redness, or swelling where the shot was given  hoarseness  sore, red or itchy eyes  cough  fever  aches  headache  itching  fatigue If these problems occur, they usually begin soon after the shot and last 1 or 2 days. More serious problems following a flu shot can include the following:  There may be a small increased risk of Guillain-Barré Syndrome (GBS) after inactivated flu vaccine. This risk has been estimated at 1 or 2 additional cases per million people vaccinated. This is much lower than the risk of severe complications from flu, which can be prevented by flu vaccine.  Young children who get the flu shot along with pneumococcal vaccine (PCV13) and/or DTaP vaccine at the same time might be slightly more likely to have a seizure caused by fever. Ask your doctor for more information. Tell your doctor if a child who is getting flu vaccine has ever had a seizure. Problems that could happen after any injected vaccine:  People sometimes faint after a medical procedure, including vaccination. Sitting or lying down for about 15 minutes can help prevent fainting, and injuries caused by a fall. Tell your doctor if you feel dizzy, or have vision changes or ringing in the ears.  Some people get severe pain in the shoulder and have difficulty moving the arm where a shot was given. This happens very rarely.  Any medication can cause a severe allergic reaction. Such reactions from a vaccine are very rare, estimated at about 1 in a million doses, and would happen within a few minutes to a few hours after the vaccination. As with any medicine, there is a very remote chance of a vaccine causing a serious injury or death. The safety of vaccines is always being monitored. For more information, visit: www.cdc.gov/vaccinesafety/ 
 
5. What if there is a serious reaction? What should I look for?  Look for anything that concerns you, such as signs of a severe allergic reaction, very high fever, or unusual behavior. Signs of a severe allergic reaction can include hives, swelling of the face and throat, difficulty breathing, a fast heartbeat, dizziness, and weakness  usually within a few minutes to a few hours after the vaccination. What should I do?  If you think it is a severe allergic reaction or other emergency that cant wait, call 9-1-1 and get the person to the nearest hospital. Otherwise, call your doctor.  Reactions should be reported to the Vaccine Adverse Event Reporting System (VAERS). Your doctor should file this report, or you can do it yourself through  the VAERS web site at www.vaers. [facility].gov, or by calling 3-685.160.5661. VAERS does not give medical advice. 6. The National Vaccine Injury Compensation Program 
 
The [facility] Vaccine Injury Compensation Program (VICP) is a federal program that was created to compensate people who may have been injured by certain vaccines. Persons who believe they may have been injured by a vaccine can learn about the program and about filing a claim by calling 4-729.599.9481 or visiting the [employer] website at www.[facility].gov/vaccinecompensation. There is a time limit to file a claim for compensation. 7. How can I learn more?  Ask your healthcare provider. He or she can give you the vaccine package insert or suggest other sources of information.  Call your local or state health department.  Contact the Centers for Disease Control and Prevention (CDC): 
- Call 4-720.687.4195 (1-800-CDC-INFO) or 
- Visit CDCs website at www.cdc.gov/flu Vaccine Information Statement Inactivated Influenza Vaccine 8/7/2015 
42 MARITA Keith [id number] Department of [facility] and [employer] Centers for Disease Control and Prevention Office Use Only Introducing [facility] SERVICES! Fabianaiggy Renae introduces [employer] patient portal. Now you can access parts of your medical record, email your doctor's office, and request medication refills online. 1. In your internet browser, go to https://[employer] 2. Click on the First Time User? Click Here link in the Sign In box. You will see the New Member Sign Up page. 3. Enter your [employer] Access Code exactly as it appears below. You will not need to use this code after youve completed the sign-up process. If you do not sign up before the expiration date, you must request a new code. · [employer] Access Code: [email or web address] Expires: 12/3/2018  9:30 AM 
 
4. Enter the last four digits of your Social Security Number (xxxx) and Date of Birth (mm/dd/yyyy) as indicated and click Submit. You will be taken to the next sign-up page. 5. Create a [employer] ID. This will be your [employer] login ID and cannot be changed, so think of one that is secure and easy to remember. 6. Create a [employer] password. You can change your password at any time. 7. Enter your Password Reset Question and Answer. This can be used at a later time if you forget your password. 8. Enter your e-mail address. You will receive e-mail notification when new information is available in [address] 9. Click Sign Up. You can now view and download portions of your medical record. 10. Click the Download Summary menu link to download a portable copy of your medical information. If you have questions, please visit the Frequently Asked Questions section of the [employer] website. Remember, [employer] is NOT to be used for urgent needs. For medical emergencies, dial 911. Now available from your iPhone and Android! Please provide this summary of care documentation to your next provider. Your primary care clinician is listed as Joel Corral. If you have any questions after today's visit, please call [phone].

## 2018-09-04 NOTE — PATIENT INSTRUCTIONS
Vaccine Information Statement Influenza (Flu) Vaccine (Inactivated or Recombinant): What you need to know Many Vaccine Information Statements are available in [place] and other languages. See www.immunize.org/vis Hojas de Información Sobre Vacunas están disponibles en Español y en muchos otros idiomas. Visite www.immunize.org/vis 1. Why get vaccinated? Influenza (flu) is a contagious disease that spreads around the [place] every year, usually between October and May. Flu is caused by influenza viruses, and is spread mainly by coughing, sneezing, and close contact. Anyone can get flu. Flu strikes suddenly and can last several days. Symptoms vary by age, but can include: 
 fever/chills  sore throat  muscle aches  fatigue  cough  headache  runny or stuffy nose Flu can also lead to pneumonia and blood infections, and cause diarrhea and seizures in children. If you have a medical condition, such as heart or lung disease, flu can make it worse. Flu is more dangerous for some people. Infants and young children, people 72years of age and older, pregnant women, and people with certain health conditions or a weakened immune system are at greatest risk. Each year thousands of people in the [facility] die from flu, and many more are hospitalized. Flu vaccine can: 
 keep you from getting flu, 
 make flu less severe if you do get it, and 
 keep you from spreading flu to your family and other people. 2. Inactivated and recombinant flu vaccines A dose of flu vaccine is recommended every flu season. Children 6 months through 6years of age may need two doses during the same flu season. Everyone else needs only one dose each flu season.   
 
 
Some inactivated flu vaccines contain a very small amount of a mercury-based preservative called thimerosal. Studies have not shown thimerosal in vaccines to be harmful, but flu vaccines that do not contain thimerosal are available. There is no live flu virus in flu shots. They cannot cause the flu. There are many flu viruses, and they are always changing. Each year a new flu vaccine is made to protect against three or four viruses that are likely to cause disease in the upcoming flu season. But even when the vaccine doesnt exactly match these viruses, it may still provide some protection Flu vaccine cannot prevent: 
 flu that is caused by a virus not covered by the vaccine, or 
 illnesses that look like flu but are not. It takes about 2 weeks for protection to develop after vaccination, and protection lasts through the flu season. 3. Some people should not get this vaccine Tell the person who is giving you the vaccine:  If you have any severe, life-threatening allergies. If you ever had a life-threatening allergic reaction after a dose of flu vaccine, or have a severe allergy to any part of this vaccine, you may be advised not to get vaccinated. Most, but not all, types of flu vaccine contain a small amount of egg protein.  If you ever had Guillain-Barré Syndrome (also called GBS). Some people with a history of GBS should not get this vaccine. This should be discussed with your doctor.  If you are not feeling well. It is usually okay to get flu vaccine when you have a mild illness, but you might be asked to come back when you feel better. 4. Risks of a vaccine reaction With any medicine, including vaccines, there is a chance of reactions. These are usually mild and go away on their own, but serious reactions are also possible. Most people who get a flu shot do not have any problems with it. Minor problems following a flu shot include:  
 soreness, redness, or swelling where the shot was given  hoarseness  sore, red or itchy eyes  cough  fever  aches  headache  itching  fatigue If these problems occur, they usually begin soon after the shot and last 1 or 2 days. More serious problems following a flu shot can include the following:  There may be a small increased risk of Guillain-Barré Syndrome (GBS) after inactivated flu vaccine. This risk has been estimated at 1 or 2 additional cases per million people vaccinated. This is much lower than the risk of severe complications from flu, which can be prevented by flu vaccine.  Young children who get the flu shot along with pneumococcal vaccine (PCV13) and/or DTaP vaccine at the same time might be slightly more likely to have a seizure caused by fever. Ask your doctor for more information. Tell your doctor if a child who is getting flu vaccine has ever had a seizure. Problems that could happen after any injected vaccine:  People sometimes faint after a medical procedure, including vaccination. Sitting or lying down for about 15 minutes can help prevent fainting, and injuries caused by a fall. Tell your doctor if you feel dizzy, or have vision changes or ringing in the ears.  Some people get severe pain in the shoulder and have difficulty moving the arm where a shot was given. This happens very rarely.  Any medication can cause a severe allergic reaction. Such reactions from a vaccine are very rare, estimated at about 1 in a million doses, and would happen within a few minutes to a few hours after the vaccination. As with any medicine, there is a very remote chance of a vaccine causing a serious injury or death. The safety of vaccines is always being monitored. For more information, visit: www.cdc.gov/vaccinesafety/ 
 
5. What if there is a serious reaction? What should I look for?  Look for anything that concerns you, such as signs of a severe allergic reaction, very high fever, or unusual behavior.  
 
Signs of a severe allergic reaction can include hives, swelling of the face and throat, difficulty breathing, a fast heartbeat, dizziness, and weakness  usually within a few minutes to a few hours after the vaccination. What should I do?  If you think it is a severe allergic reaction or other emergency that cant wait, call 9-1-1 and get the person to the nearest hospital. Otherwise, call your doctor.  Reactions should be reported to the Vaccine Adverse Event Reporting System (VAERS). Your doctor should file this report, or you can do it yourself through  the VAERS web site at www.vaers. [facility].gov, or by calling 1-136.411.3398. VAERS does not give medical advice. 6. The National Vaccine Injury Compensation Program 
 
The [facility] Vaccine Injury Compensation Program (VICP) is a federal program that was created to compensate people who may have been injured by certain vaccines. Persons who believe they may have been injured by a vaccine can learn about the program and about filing a claim by calling 1-655.269.9550 or visiting the [address] [employer] website at www.[facility].gov/vaccinecompensation. There is a time limit to file a claim for compensation. 7. How can I learn more?  Ask your healthcare provider. He or she can give you the vaccine package insert or suggest other sources of information.  Call your local or state health department.  Contact the Centers for Disease Control and Prevention (CDC): 
- Call 7-296.303.8039 (1-800-CDC-INFO) or 
- Visit CDCs website at www.cdc.gov/flu Vaccine Information Statement Inactivated Influenza Vaccine 8/7/2015 
42 MARITA Saleem Mt 948XN-[id number] Department of [facility] and [place] Centers for Disease Control and Prevention Office Use Only

## 2018-09-04 NOTE — ACP (ADVANCE CARE PLANNING)
Reviewed AMD on file - discusses her wishes but does not designate a healthcare proxy. Advised patient to consider revising form to include this information.

## 2018-09-04 NOTE — PROGRESS NOTES
Chief Complaint Patient presents with  Hypertension 1. Have you been to the ER, urgent care clinic since your last visit? Hospitalized since your last visit? Yes Where: [facility]  Reason for visit: UTI 2. Have you seen or consulted any other health care providers outside of the [address] [place] [address] since your last visit? Include any pap smears or colon screening. No  
 
Health Maintenance Due Topic Date Due  Influenza Age 5 to Adult  08/01/2018

## 2018-09-04 NOTE — PROGRESS NOTES
HPI: 
81 y.o.  presents for follow up appointment. No hospital, ER or specialist visits since last primary care visit except as noted below. Patient Active Problem List  
 Diagnosis  Glucose intolerance (impaired glucose tolerance) -   No polyuria/polydipsia. No unexplained weight loss. No change in vision. No tingling or numbness in feet.  Ulcerative colitis ([facility] 3-4 formed BMs each morning. Refuses further colonoscopy or treatment. No bloody stool. Sometimes constipated, sometimes liquid. Lots of bloating  Statin intolerance  Vitamin D deficiency - not on any supplement currently  Hypothyroid -No hair loss, no constipation, no dry skin or brittle nails, no palpitations. Taking medication each morning on an empty stomach.  Hyperlipidemia - Takes medication at night as directed, no muscle aches. Tries to watch diet.  GERD (gastroesophageal reflux disease) - sometimes reflux - zantac helps some.  Hypertension - No home monitoring. Compliant with medication. No shortness of breath, no chest pain, no vision change, no headache, no lower extremity edema. \  
 Allergic rhinitis  Chronic kidney disease (CKD), stage III (moderate) - no edema, no change in urination patterns, sometimes UTI treated at [facility] Past Medical History:  
Diagnosis Date  Allergic rhinitis   
 asthma allergies  Arthritis  Chronic kidney disease (CKD), stage III (moderate)  GERD (gastroesophageal reflux disease)  Hyperlipidemia  Hypertension  Ill-defined condition   
 hard of hearing  Ulcerative colitis ([facility] 10/28/2014 Social History Substance Use Topics  Smoking status: Never Smoker  Smokeless tobacco: Never Used  Alcohol use No  
 
 
Outpatient Prescriptions Marked as Taking for the 9/4/18 encounter (Office Visit) with Florence Garcia MD  
Medication Sig Dispense Refill  allopurinol (ZYLOPRIM) 100 mg tablet TAKE 1 TABLET DAILY 90 Tab 3  
  levothyroxine (SYNTHROID) 50 mcg tablet TAKE 1 TABLET DAILY BEFORE BREAKFAST 90 Tab 3  
 felodipine (PLENDIL SR) 5 mg 24 hr tablet Take 1 Tab by mouth daily. 90 Tab 3  
 azelastine (ASTELIN) 137 mcg (0.1 %) nasal spray USE 1 SPRAY IN EACH NOSTRIL TWICE A DAY AS DIRECTED 1 Bottle 11  
 carvedilol (COREG) 3.125 mg tablet TAKE 1 TABLET TWICE A DAY WITH MEALS 180 Tab 3  
 ergocalciferol (VITAMIN D2) 50,000 unit capsule TAKE ONE CAPSULE BY MOUTH ONCE WEEKLY 12 Cap 4  
 omega-3 fatty acids-vitamin e (FISH OIL) 1,000 mg cap Take 1 Cap by mouth.  UBIDECARENONE (COENZYME Q10) 100 mg tab Take  by mouth.  rosuvastatin (CRESTOR) 5 mg tablet Take 5 mg by mouth nightly. Taking twice a week Mon and Fri    
 ranitidine hcl (ZANTAC) 150 mg capsule Take 150 mg by mouth as needed. Allergies Allergen Reactions  Cefdinir Diarrhea  Penicillin G Rash  Statins-Hmg-Coa Reductase Inhibitors Other (comments) arthralgia ROS: 
ROS negative except as per HPI. PE: 
Visit Vitals  /64 (BP 1 Location: Right arm, BP Patient Position: Sitting)  Pulse 68  Temp 97.6 °F (36.4 °C) (Oral)  Resp 20  
 Ht 5' 5\" (1.651 m)  Wt 165 lb (74.8 kg)  SpO2 96%  BMI 27.46 kg/m2 Gen: alert, oriented, no acute distress Head: normocephalic, atraumatic Ears: external auditory canals clear, TMs without erythema or effusion Eyes: pupils equal round reactive to light, sclera clear, conjunctiva clear Oral: moist mucus membranes, no oral lesions, no pharyngeal inflammation or exudate Neck: symmetric normal sized thyroid, no carotid bruits, no jugular vein distention Resp: no increase work of breathing, lungs clear to ausculation bilaterally, no wheezing, rales or rhonchi CV: S1, S2 normal.  No murmurs, rubs, or gallops. Abd: soft, not tender, not distended. No hepatosplenomegaly. Normal bowel sounds.    
Neuro: cranial nerves intact, normal strength and movement in all extremities, reflexes and sensation intact and symmetric. Skin: no lesion or rash Extremities: no cyanosis or edema Assessment/Plan: 1. Glucose intolerance (impaired glucose tolerance) No changes in medications pending lab results. 
- HEMOGLOBIN A1C WITH EAG 2. 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 
- TSH 3RD GENERATION 3. Chronic kidney disease (CKD), stage III (moderate) No changes in medications pending lab results. 4. Statin intolerance 5. Ulcerative colitis with complication, unspecified location ([facility] Refuses treatment 6. Essential hypertension At goal.  Continue current medications. - CBC WITH AUTOMATED DIFF 7. Hyperlipidemia, unspecified hyperlipidemia type No changes in medications pending lab results. - METABOLIC PANEL, COMPREHENSIVE 
- LIPID PANEL 8. Vitamin D deficiency Not on current supplement 
- VITAMIN D, 25 HYDROXY 9. Gout, unspecified cause, unspecified chronicity, unspecified site Health Maintenance reviewed - updated. Orders Placed This Encounter  Influenza Vaccine Inactivated (IIV)(FLUAD), Subunit, Adjuvanted, IM, (60941)  CBC WITH AUTOMATED DIFF  
 METABOLIC PANEL, COMPREHENSIVE  LIPID PANEL  
 HEMOGLOBIN A1C WITH EAG  
 TSH 3RD GENERATION  
 VITAMIN D, 25 HYDROXY  Administration fee () for Medicare insured patients Medications Discontinued During This Encounter Medication Reason  CHLORPHENIRAMINE MALEATE (CHLOR-TRIMETON PO) Not A Current Medication Current Outpatient Prescriptions Medication Sig Dispense Refill  allopurinol (ZYLOPRIM) 100 mg tablet TAKE 1 TABLET DAILY 90 Tab 3  
 levothyroxine (SYNTHROID) 50 mcg tablet TAKE 1 TABLET DAILY BEFORE BREAKFAST 90 Tab 3  
 felodipine (PLENDIL SR) 5 mg 24 hr tablet Take 1 Tab by mouth daily.  90 Tab 3  
 azelastine (ASTELIN) 137 mcg (0.1 %) nasal spray USE 1 SPRAY IN [facility] NOSTRIL TWICE A DAY AS DIRECTED 1 Bottle 11  
 carvedilol (COREG) 3.125 mg tablet TAKE 1 TABLET TWICE A DAY WITH MEALS 180 Tab 3  
 ergocalciferol (VITAMIN D2) 50,000 unit capsule TAKE ONE CAPSULE BY MOUTH ONCE WEEKLY 12 Cap 4  
 omega-3 fatty acids-vitamin e (FISH OIL) 1,000 mg cap Take 1 Cap by mouth.  UBIDECARENONE (COENZYME Q10) 100 mg tab Take  by mouth.  rosuvastatin (CRESTOR) 5 mg tablet Take 5 mg by mouth nightly. Taking twice a week Mon and Fri    
 ranitidine hcl (ZANTAC) 150 mg capsule Take 150 mg by mouth as needed. Recommended healthy diet low in carbohydrates, fats, sodium and cholesterol. Recommended regular cardiovascular exercise 3-6 times per week for 30-60 minutes daily. Verbal and written instructions (see AVS) provided. Patient expresses understanding of diagnosis and treatment plan.

## 2018-09-05 LAB
25(OH)D3+25(OH)D2 SERPL-MCNC: 59.8 NG/ML (ref 30–100)
ALBUMIN SERPL-MCNC: 4.4 G/DL (ref 3.5–4.7)
ALBUMIN/GLOB SERPL: 1.6 {RATIO} (ref 1.2–2.2)
ALP SERPL-CCNC: 78 IU/L (ref 39–117)
ALT SERPL-CCNC: 26 IU/L (ref 0–32)
AST SERPL-CCNC: 34 IU/L (ref 0–40)
BASOPHILS # BLD AUTO: 0.1 X10E3/UL (ref 0–0.2)
BASOPHILS NFR BLD AUTO: 1 %
BILIRUB SERPL-MCNC: 0.4 MG/DL (ref 0–1.2)
BUN SERPL-MCNC: 35 MG/DL (ref 8–27)
BUN/CREAT SERPL: 23 (ref 12–28)
CALCIUM SERPL-MCNC: 10.3 MG/DL (ref 8.7–10.3)
CHLORIDE SERPL-SCNC: 104 MMOL/L (ref 96–106)
CHOLEST SERPL-MCNC: 172 MG/DL (ref 100–199)
CO2 SERPL-SCNC: 22 MMOL/L (ref 20–29)
CREAT SERPL-MCNC: 1.52 MG/DL (ref 0.57–1)
EOSINOPHIL # BLD AUTO: 0.4 X10E3/UL (ref 0–0.4)
EOSINOPHIL NFR BLD AUTO: 5 %
ERYTHROCYTE [DISTWIDTH] IN BLOOD BY AUTOMATED COUNT: 15.5 % (ref 12.3–15.4)
EST. AVERAGE GLUCOSE BLD GHB EST-MCNC: 134 MG/DL
GLOBULIN SER CALC-MCNC: 2.8 G/DL (ref 1.5–4.5)
GLUCOSE SERPL-MCNC: 168 MG/DL (ref 65–99)
HBA1C MFR BLD: 6.3 % (ref 4.8–5.6)
HCT VFR BLD AUTO: 39 % (ref 34–46.6)
HDLC SERPL-MCNC: 64 MG/DL
HGB BLD-MCNC: 12.8 G/DL (ref 11.1–15.9)
IMM GRANULOCYTES # BLD: 0 X10E3/UL (ref 0–0.1)
IMM GRANULOCYTES NFR BLD: 0 %
INTERPRETATION, 910389: NORMAL
INTERPRETATION: NORMAL
LDLC SERPL CALC-MCNC: 85 MG/DL (ref 0–99)
LYMPHOCYTES # BLD AUTO: 3 X10E3/UL (ref 0.7–3.1)
LYMPHOCYTES NFR BLD AUTO: 43 %
MCH RBC QN AUTO: 31.5 PG (ref 26.6–33)
MCHC RBC AUTO-ENTMCNC: 32.8 G/DL (ref 31.5–35.7)
MCV RBC AUTO: 96 FL (ref 79–97)
MONOCYTES # BLD AUTO: 0.6 X10E3/UL (ref 0.1–0.9)
MONOCYTES NFR BLD AUTO: 8 %
NEUTROPHILS # BLD AUTO: 3.1 X10E3/UL (ref 1.4–7)
NEUTROPHILS NFR BLD AUTO: 43 %
PDF IMAGE, 910387: NORMAL
PLATELET # BLD AUTO: 240 X10E3/UL (ref 150–379)
POTASSIUM SERPL-SCNC: 4.3 MMOL/L (ref 3.5–5.2)
PROT SERPL-MCNC: 7.2 G/DL (ref 6–8.5)
RBC # BLD AUTO: 4.06 X10E6/UL (ref 3.77–5.28)
SODIUM SERPL-SCNC: 143 MMOL/L (ref 134–144)
TRIGL SERPL-MCNC: 114 MG/DL (ref 0–149)
TSH SERPL DL<=0.005 MIU/L-ACNC: 3.31 UIU/ML (ref 0.45–4.5)
VLDLC SERPL CALC-MCNC: 23 MG/DL (ref 5–40)
WBC # BLD AUTO: 7 X10E3/UL (ref 3.4–10.8)

## 2018-09-07 RX ORDER — MELATONIN
2000 DAILY
Qty: 60 TAB | Refills: 0
Start: 2018-09-07 | End: 2022-06-29 | Stop reason: SDUPTHER

## 2019-01-07 RX ORDER — CARVEDILOL 3.12 MG/1
TABLET ORAL
Qty: 180 TAB | Refills: 3 | Status: SHIPPED | OUTPATIENT
Start: 2019-01-07 | End: 2020-01-13

## 2019-03-05 ENCOUNTER — HOSPITAL ENCOUNTER (OUTPATIENT)
Dept: LAB | Age: 84
Discharge: HOME | End: 2019-03-05
Payer: MEDICARE

## 2019-03-05 ENCOUNTER — OFFICE VISIT (OUTPATIENT)
Dept: FAMILY MEDICINE CLINIC | Age: 84
End: 2019-03-05

## 2019-03-05 VITALS
OXYGEN SATURATION: 95 % | WEIGHT: 166 LBS | HEIGHT: 65 IN | SYSTOLIC BLOOD PRESSURE: 136 MMHG | RESPIRATION RATE: 14 BRPM | DIASTOLIC BLOOD PRESSURE: 75 MMHG | BODY MASS INDEX: 27.66 KG/M2 | HEART RATE: 69 BPM | TEMPERATURE: 97.6 F

## 2019-03-05 DIAGNOSIS — E78.5 HYPERLIPIDEMIA, UNSPECIFIED HYPERLIPIDEMIA TYPE: ICD-10-CM

## 2019-03-05 DIAGNOSIS — K51.919 ULCERATIVE COLITIS WITH COMPLICATION, UNSPECIFIED LOCATION (HCC): ICD-10-CM

## 2019-03-05 DIAGNOSIS — I10 ESSENTIAL HYPERTENSION: ICD-10-CM

## 2019-03-05 DIAGNOSIS — E03.2 HYPOTHYROIDISM DUE TO NON-MEDICATION EXOGENOUS SUBSTANCES: ICD-10-CM

## 2019-03-05 DIAGNOSIS — N18.30 CHRONIC KIDNEY DISEASE (CKD), STAGE III (MODERATE) (HCC): ICD-10-CM

## 2019-03-05 DIAGNOSIS — Z00.00 ROUTINE GENERAL MEDICAL EXAMINATION AT A HEALTH CARE FACILITY: Primary | ICD-10-CM

## 2019-03-05 DIAGNOSIS — M10.9 GOUT, UNSPECIFIED CAUSE, UNSPECIFIED CHRONICITY, UNSPECIFIED SITE: ICD-10-CM

## 2019-03-05 DIAGNOSIS — Z78.9 STATIN INTOLERANCE: ICD-10-CM

## 2019-03-05 DIAGNOSIS — R73.02 GLUCOSE INTOLERANCE (IMPAIRED GLUCOSE TOLERANCE): ICD-10-CM

## 2019-03-05 PROCEDURE — 36415 COLL VENOUS BLD VENIPUNCTURE: CPT

## 2019-03-05 PROCEDURE — 84550 ASSAY OF BLOOD/URIC ACID: CPT

## 2019-03-05 PROCEDURE — 80061 LIPID PANEL: CPT

## 2019-03-05 PROCEDURE — 83036 HEMOGLOBIN GLYCOSYLATED A1C: CPT

## 2019-03-05 PROCEDURE — 85025 COMPLETE CBC W/AUTO DIFF WBC: CPT

## 2019-03-05 PROCEDURE — 84443 ASSAY THYROID STIM HORMONE: CPT

## 2019-03-05 PROCEDURE — 80053 COMPREHEN METABOLIC PANEL: CPT

## 2019-03-05 NOTE — PROGRESS NOTES
.  Chief Complaint   Patient presents with    Check Up    Abdominal Pain     constipation    Back Pain    Gas     . 1. Have you been to the ER, urgent care clinic since your last visit? Hospitalized since your last visit? [facility] [place] for Leg pain    2. Have you seen or consulted any other health care providers outside of the [address] [place] [address] since your last visit? Include any pap smears or colon screening. Yes    . Health Maintenance Due   Topic Date Due    Shingrix Vaccine Age 49> (1 of 2) 03/26/1986    GLAUCOMA SCREENING Q2Y  03/05/2019     . Filomena Ordonez

## 2019-03-05 NOTE — PROGRESS NOTES
Medicare Annual Wellness Visit    I have reviewed the patient's medical history in detail and updated the computerized patient record. History   Citlali Angel is a 81 y.o. female who presents for wellness and is complaining of abdominal pain, gas, bloating, allergic rhinitis. Also following up on chronic medical issues CKD gout and hypothyroid    Past Medical History:   Diagnosis Date    Allergic rhinitis     asthma allergies    Arthritis     Chronic kidney disease (CKD), stage III (moderate)     GERD (gastroesophageal reflux disease)     Hyperlipidemia     Hypertension     Ill-defined condition     hard of hearing    Ulcerative colitis ([facility] 10/28/2014      Past Surgical History:   Procedure Laterality Date    HX CATARACT REMOVAL Bilateral     HX COLONOSCOPY  9/14    ulcerative colitis    HX ORTHOPAEDIC      bilateral knee replacement     Current Outpatient Medications   Medication Sig Dispense Refill    carvedilol (COREG) 3.125 mg tablet TAKE 1 TABLET TWICE A DAY WITH MEALS 180 Tab 3    cholecalciferol (VITAMIN D3) 1,000 unit tablet Take 2 Tabs by mouth daily. 60 Tab 0    allopurinol (ZYLOPRIM) 100 mg tablet TAKE 1 TABLET DAILY 90 Tab 3    levothyroxine (SYNTHROID) 50 mcg tablet TAKE 1 TABLET DAILY BEFORE BREAKFAST 90 Tab 3    felodipine (PLENDIL SR) 5 mg 24 hr tablet Take 1 Tab by mouth daily. 90 Tab 3    azelastine (ASTELIN) 137 mcg (0.1 %) nasal spray USE 1 SPRAY IN EACH NOSTRIL TWICE A DAY AS DIRECTED 1 Bottle 11    omega-3 fatty acids-vitamin e (FISH OIL) 1,000 mg cap Take 1 Cap by mouth.  UBIDECARENONE (COENZYME Q10) 100 mg tab Take  by mouth.  rosuvastatin (CRESTOR) 5 mg tablet Take 5 mg by mouth nightly. Taking twice a week Mon and Fri      ranitidine hcl (ZANTAC) 150 mg capsule Take 150 mg by mouth as needed.        Allergies   Allergen Reactions    Cefdinir Diarrhea    Penicillin G Rash    Statins-Hmg-Coa Reductase Inhibitors Other (comments)     arthralgia     Family History   Problem Relation Age of Onset    Diabetes Daughter     Hypertension Mother     Heart Disease Father      Social History     Tobacco Use    Smoking status: Never Smoker    Smokeless tobacco: Never Used   Substance Use Topics    Alcohol use: No     Patient Active Problem List   Diagnosis Code    Hyperlipidemia E78.5    GERD (gastroesophageal reflux disease) K21.9    Hypertension I10    Allergic rhinitis J30.9    Chronic kidney disease (CKD), stage III (moderate) (HCC) N18.3    Hypothyroid E03.9    Vitamin D deficiency E55.9    Ulcerative colitis ([facility] K51.90    Statin intolerance Z78.9    Glucose intolerance (impaired glucose tolerance) R73.02    Gout M10.9       Depression Risk Factor Screening:     3 most recent PHQ Screens 3/5/2019   Little interest or pleasure in doing things -   Feeling down, depressed, irritable, or hopeless Not at all   Total Score PHQ 2 -   Trouble falling or staying asleep, or sleeping too much -   Feeling tired or having little energy -   Poor appetite, weight loss, or overeating -   Feeling bad about yourself - or that you are a failure or have let yourself or your family down -   Trouble concentrating on things such as school, work, reading, or watching TV -   Moving or speaking so slowly that other people could have noticed; or the opposite being so fidgety that others notice -   Thoughts of being better off dead, or hurting yourself in some way -   PHQ 9 Score -   How difficult have these problems made it for you to do your work, take care of your home and get along with others -     Alcohol Risk Factor Screening: On any occasion during the past 3 months, have you had more than 3 drinks containing alcohol? No    Do you average more than 7 drinks per week? No      Functional Ability and Level of Safety:     Hearing Loss   none    Activities of Daily Living   Self-care.    Requires assistance with: no ADLs    Fall Risk     Fall Risk Assessment, last 12 mths 3/5/2019   Able to walk? Yes   Fall in past 12 months? No   Fall with injury? -   Number of falls in past 12 months -   Fall Risk Score -     Abuse Screen   Patient is not abused    Review of Systems   ROS:  As listed in HPI. In addition:  Constitutional:   No headache, fever, fatigue, weight loss or weight gain      Eyes:   No redness, pruritis, pain, visual changes, swelling, or discharge      Ears:    No pain, loss or changes in hearing     Cardiac:    No chest pain      Resp:   No cough or shortness of breath      Neuro   No loss of consciousness, dizziness, seizure    Physical Examination     Evaluation of Cognitive Function:  Mood/affect:  happy  Appearance: age appropriate  Family member/caregiver input:     Physical Exam:  Blood pressure 136/75, pulse 69, temperature 97.6 °F (36.4 °C), resp. rate 14, height 5' 5\" (1.651 m), weight 166 lb (75.3 kg), SpO2 95 %. GEN: No apparent distress. Alert and oriented and responds to all questions appropriately. EYES:  Conjunctiva clear; pupils round and reactive to light; extraocular movements are intact. EAR: External ears are normal.  Tympanic membranes are clear and without effusion. NOSE: Turbinates are within normal limits. No drainage  OROPHYARYNX: No oral lesions or exudates. NECK:  Supple; no masses; thyroid normal           LUNGS: Respirations unlabored; clear to auscultation bilaterally  CARDIOVASCULAR: Regular, rate, and rhythm without murmurs   ABDOMEN: Soft; nontender; nondistended; normoactive bowel sounds; no masses or organomegaly  NEUROLOGIC:  No focal neurologic deficits. Strength and sensation grossly intact. Coordination and gait grossly intact. EXT: Well perfused. No edema. SKIN: No obvious rashes.     Patient Care Team:  Alpesh Mariano MD as PCP - General (Internal Medicine)    Advice/Referrals/Counseling   Education and counseling provided:    Glaucoma screening with Dr. Peewee Tanner    Colonoscopy Dr. Mauro Manley 2015 removed a very large polyp and recommended repeat colonoscopy in 2 years. Increased concern because of ulcerative colitis. Patient refusing colonoscopies because of long recovery time after the last one    Assessment/Plan     Ulcerative colitis  Was taking mesalamine but cost was $270 so she stopped. Not feel that it helped with her diarrhea. Might have been taking this when she had her 2015 colonoscopy and no inflammation was noted. However large polyp, recommended follow-up in 2 years because it had to be removed piecemeal    I discussed with patient the risks his ulcerative colitis. The fact that her symptoms today could be a result of her ulcerative colitis and that she would likely require prescription medication. However she needs to see gastroenterology to discuss this. Referred. Also explained the role of a colonoscopy and that the colonoscopy was not to treat her symptoms so she could not expect improvement with colonoscopy alone. Explained the risk of the very large polyp. She still feels disinclined for colonoscopy    Gout  Well-controlled on allopurinol, check uric acid    CKD  Is to avoid NSAIDs    Allergic rhinitis  Allegra/Zyrtec and Astelin    Hypothyroid  TSH      ICD-10-CM ICD-9-CM    1. Routine general medical examination at a health care facility Z00.00 V70.0    2. Glucose intolerance (impaired glucose tolerance) R73.02 790.22 HEMOGLOBIN A1C WITH EAG   3. Hypothyroidism due to non-medication exogenous substances E03.2 244.3 TSH 3RD GENERATION   4. Chronic kidney disease (CKD), stage III (moderate) (HCC) N18.3 585.3    5. Statin intolerance Z78.9 995.27    6. Ulcerative colitis with complication, unspecified location ([facility] K51.919 556.9 REFERRAL TO GASTROENTEROLOGY   7. Essential hypertension N88 997.8 METABOLIC PANEL, COMPREHENSIVE      CBC WITH AUTOMATED DIFF   8.  Hyperlipidemia, unspecified hyperlipidemia type E78.5 272.4 LIPID PANEL   9. Gout, unspecified cause, unspecified chronicity, unspecified site M10.9 274.9 URIC ACID

## 2019-03-06 LAB
ALBUMIN SERPL-MCNC: 4.3 G/DL (ref 3.5–4.7)
ALBUMIN/GLOB SERPL: 1.2 {RATIO} (ref 1.2–2.2)
ALP SERPL-CCNC: 78 IU/L (ref 39–117)
ALT SERPL-CCNC: 40 IU/L (ref 0–32)
AST SERPL-CCNC: 48 IU/L (ref 0–40)
BASOPHILS # BLD AUTO: 0.1 X10E3/UL (ref 0–0.2)
BASOPHILS NFR BLD AUTO: 1 %
BILIRUB SERPL-MCNC: 0.7 MG/DL (ref 0–1.2)
BUN SERPL-MCNC: 25 MG/DL (ref 8–27)
BUN/CREAT SERPL: 18 (ref 12–28)
CALCIUM SERPL-MCNC: 10.3 MG/DL (ref 8.7–10.3)
CHLORIDE SERPL-SCNC: 101 MMOL/L (ref 96–106)
CHOLEST SERPL-MCNC: 187 MG/DL (ref 100–199)
CO2 SERPL-SCNC: 22 MMOL/L (ref 20–29)
CREAT SERPL-MCNC: 1.4 MG/DL (ref 0.57–1)
EOSINOPHIL # BLD AUTO: 0.4 X10E3/UL (ref 0–0.4)
EOSINOPHIL NFR BLD AUTO: 4 %
ERYTHROCYTE [DISTWIDTH] IN BLOOD BY AUTOMATED COUNT: 15 % (ref 12.3–15.4)
EST. AVERAGE GLUCOSE BLD GHB EST-MCNC: 137 MG/DL
GLOBULIN SER CALC-MCNC: 3.5 G/DL (ref 1.5–4.5)
GLUCOSE SERPL-MCNC: 128 MG/DL (ref 65–99)
HBA1C MFR BLD: 6.4 % (ref 4.8–5.6)
HCT VFR BLD AUTO: 41.5 % (ref 34–46.6)
HDLC SERPL-MCNC: 80 MG/DL
HGB BLD-MCNC: 13.4 G/DL (ref 11.1–15.9)
IMM GRANULOCYTES # BLD AUTO: 0 X10E3/UL (ref 0–0.1)
IMM GRANULOCYTES NFR BLD AUTO: 0 %
INTERPRETATION, 910389: NORMAL
INTERPRETATION: NORMAL
LDLC SERPL CALC-MCNC: 84 MG/DL (ref 0–99)
LYMPHOCYTES # BLD AUTO: 4.1 X10E3/UL (ref 0.7–3.1)
LYMPHOCYTES NFR BLD AUTO: 42 %
MCH RBC QN AUTO: 31.7 PG (ref 26.6–33)
MCHC RBC AUTO-ENTMCNC: 32.3 G/DL (ref 31.5–35.7)
MCV RBC AUTO: 98 FL (ref 79–97)
MONOCYTES # BLD AUTO: 0.8 X10E3/UL (ref 0.1–0.9)
MONOCYTES NFR BLD AUTO: 8 %
NEUTROPHILS # BLD AUTO: 4.3 X10E3/UL (ref 1.4–7)
NEUTROPHILS NFR BLD AUTO: 45 %
PDF IMAGE, 910387: NORMAL
PLATELET # BLD AUTO: 248 X10E3/UL (ref 150–379)
POTASSIUM SERPL-SCNC: 4.8 MMOL/L (ref 3.5–5.2)
PROT SERPL-MCNC: 7.8 G/DL (ref 6–8.5)
RBC # BLD AUTO: 4.23 X10E6/UL (ref 3.77–5.28)
SODIUM SERPL-SCNC: 140 MMOL/L (ref 134–144)
TRIGL SERPL-MCNC: 115 MG/DL (ref 0–149)
TSH SERPL DL<=0.005 MIU/L-ACNC: 2.66 UIU/ML (ref 0.45–4.5)
URATE SERPL-MCNC: 5.3 MG/DL (ref 2.5–7.1)
VLDLC SERPL CALC-MCNC: 23 MG/DL (ref 5–40)
WBC # BLD AUTO: 9.6 X10E3/UL (ref 3.4–10.8)

## 2019-03-22 RX ORDER — FELODIPINE 5 MG/1
5 TABLET, EXTENDED RELEASE ORAL DAILY
Qty: 90 TAB | Refills: 3 | Status: SHIPPED | OUTPATIENT
Start: 2019-03-22 | End: 2020-03-02

## 2019-03-22 NOTE — TELEPHONE ENCOUNTER
Patient calling in for refills to go to [facility]. Patient says she has at least a week's worth of pills.

## 2019-08-05 NOTE — TELEPHONE ENCOUNTER
Requested Prescriptions     Pending Prescriptions Disp Refills    levothyroxine (SYNTHROID) 50 mcg tablet 90 Tab 3    allopurinol (ZYLOPRIM) 100 mg tablet 90 Tab 3     Requested from [facility]

## 2019-08-06 RX ORDER — LEVOTHYROXINE SODIUM 50 UG/1
TABLET ORAL
Qty: 90 TAB | Refills: 3 | Status: SHIPPED | OUTPATIENT
Start: 2019-08-06 | End: 2020-07-08

## 2019-08-06 RX ORDER — ALLOPURINOL 100 MG/1
TABLET ORAL
Qty: 90 TAB | Refills: 3 | Status: SHIPPED | OUTPATIENT
Start: 2019-08-06 | End: 2020-07-08

## 2019-11-06 ENCOUNTER — OFFICE VISIT (OUTPATIENT)
Dept: FAMILY MEDICINE CLINIC | Age: 84
End: 2019-11-06

## 2019-11-06 ENCOUNTER — HOSPITAL ENCOUNTER (OUTPATIENT)
Dept: LAB | Age: 84
Discharge: HOME | End: 2019-11-06
Payer: MEDICARE

## 2019-11-06 VITALS
WEIGHT: 162.4 LBS | HEART RATE: 70 BPM | RESPIRATION RATE: 12 BRPM | OXYGEN SATURATION: 93 % | HEIGHT: 65 IN | DIASTOLIC BLOOD PRESSURE: 78 MMHG | TEMPERATURE: 97.5 F | SYSTOLIC BLOOD PRESSURE: 132 MMHG | BODY MASS INDEX: 27.06 KG/M2

## 2019-11-06 DIAGNOSIS — N18.30 CHRONIC KIDNEY DISEASE (CKD), STAGE III (MODERATE) (HCC): ICD-10-CM

## 2019-11-06 DIAGNOSIS — E03.2 HYPOTHYROIDISM DUE TO NON-MEDICATION EXOGENOUS SUBSTANCES: ICD-10-CM

## 2019-11-06 DIAGNOSIS — L57.8 SUN-DAMAGED SKIN: ICD-10-CM

## 2019-11-06 DIAGNOSIS — K21.9 GASTROESOPHAGEAL REFLUX DISEASE, ESOPHAGITIS PRESENCE NOT SPECIFIED: ICD-10-CM

## 2019-11-06 DIAGNOSIS — I10 ESSENTIAL HYPERTENSION: Primary | ICD-10-CM

## 2019-11-06 DIAGNOSIS — L98.9 NON-HEALING SKIN LESION: ICD-10-CM

## 2019-11-06 DIAGNOSIS — K51.919 ULCERATIVE COLITIS WITH COMPLICATION, UNSPECIFIED LOCATION (HCC): ICD-10-CM

## 2019-11-06 DIAGNOSIS — R73.02 GLUCOSE INTOLERANCE (IMPAIRED GLUCOSE TOLERANCE): ICD-10-CM

## 2019-11-06 PROCEDURE — 84443 ASSAY THYROID STIM HORMONE: CPT

## 2019-11-06 PROCEDURE — 36415 COLL VENOUS BLD VENIPUNCTURE: CPT

## 2019-11-06 PROCEDURE — 83036 HEMOGLOBIN GLYCOSYLATED A1C: CPT

## 2019-11-06 PROCEDURE — 85025 COMPLETE CBC W/AUTO DIFF WBC: CPT

## 2019-11-06 PROCEDURE — 80053 COMPREHEN METABOLIC PANEL: CPT

## 2019-11-06 RX ORDER — OXYCODONE HYDROCHLORIDE 5 MG/1
TABLET ORAL
COMMUNITY
Start: 2019-09-06 | End: 2019-11-06

## 2019-11-06 NOTE — PROGRESS NOTES
JENNY  Preston Deal is a 81 y.o. female who presents to follow-up on chronic medical issues. She is feeling pretty good today. She has a nonhealing skin lesion on the back of her left hand. Thinks that it has been present for at least 2 years. Says that it started out looking like skin lesions she recalls needed to be removed from her face. She does not know what the pathologic diagnosis of those lesions was. Back of the left hand is involved. Slight pucker, central ulcer, shallow ulcer, 1 cm wide 1.5 cm long. She has history of GI issues. Gas and diarrhea. These are doing better since she has cut out letuce. She is not complaining of any stomach problems today. Was last seen by gastroenterology in March 2019. She had annual colonoscopies since 2014 each time showing relatively large polyps, ulceration, inflammation. A presumptive diagnosis of ulcerative colitis was made but I do not see that this was confirmed by biopsy but I am not sure that the right kind of biopsy was done since that was the plan for a follow-up colonoscopy. She does not want any more colonoscopies because of her age and the hassle. She has been counseled on the risk of colon cancer    PMHx:  Past Medical History:   Diagnosis Date    Allergic rhinitis     asthma allergies    Arthritis     Chronic kidney disease (CKD), stage III (moderate) (HCC)     GERD (gastroesophageal reflux disease)     Hyperlipidemia     Hypertension     Ill-defined condition     hard of hearing    [facility]       Meds:   Current Outpatient Medications   Medication Sig Dispense Refill    famotidine (PEPCID PO) Take  by mouth.  ACETAMINOPHEN PO Take  by mouth.  IBUPROFEN PO Take  by mouth.       levothyroxine (SYNTHROID) 50 mcg tablet TAKE 1 TABLET DAILY BEFORE BREAKFAST 90 Tab 3    allopurinol (ZYLOPRIM) 100 mg tablet TAKE 1 TABLET DAILY 90 Tab 3    felodipine (PLENDIL SR) 5 mg 24 hr tablet Take 1 Tab by mouth daily. 90 Tab 3    carvedilol (COREG) 3.125 mg tablet TAKE 1 TABLET TWICE A DAY WITH MEALS 180 Tab 3    cholecalciferol (VITAMIN D3) 1,000 unit tablet Take 2 Tabs by mouth daily. 60 Tab 0    azelastine (ASTELIN) 137 mcg (0.1 %) nasal spray USE 1 SPRAY IN EACH NOSTRIL TWICE A DAY AS DIRECTED 1 Bottle 11    omega-3 fatty acids-vitamin e (FISH OIL) 1,000 mg cap Take 1 Cap by mouth.  rosuvastatin (CRESTOR) 5 mg tablet Take 5 mg by mouth nightly. Taking twice a week Mon and Fri         Allergies: Allergies   Allergen Reactions    Cefdinir Diarrhea    Penicillin G Rash    Statins-Hmg-Coa Reductase Inhibitors Other (comments)     arthralgia       Smoker:  Social History     Tobacco Use   Smoking Status Never Smoker   Smokeless Tobacco Never Used       ETOH:   Social History     Substance and Sexual Activity   Alcohol Use No       FH:   Family History   Problem Relation Age of Onset    Diabetes Daughter     Hypertension Mother     Heart Disease Father        ROS:   As listed in HPI. In addition:  Constitutional:   No headache, fever, fatigue, weight loss or weight gain      Cardiac:    No chest pain      Resp:   No cough or shortness of breath      Neuro   No loss of consciousness, dizziness, seizures      Physical Exam:  Blood pressure 132/78, pulse 70, temperature 97.5 °F (36.4 °C), temperature source Oral, resp. rate 12, height 5' 5\" (1.651 m), weight 162 lb 6.4 oz (73.7 kg), SpO2 93 %. GEN: No apparent distress. Alert and oriented and responds to all questions appropriately. NEUROLOGIC:  No focal neurologic deficits. Strength and sensation grossly intact. Coordination and gait grossly intact. EXT: Well perfused. No edema. SKIN: No obvious rashes.   Lungs clear to auscultation bilaterally  CV regular rate rhythm no murmur  HEENT clear tympanic membrane clear nasal mucosa clear oromucosa  Wears hearing aids       Assessment and Plan     Hypertension  Well-controlled  Carvedilol 3.125 mg    Hyperlipidemia  Crestor 5 mg  She is taking this for primary prevention. Gout  Uric acid was at goal in the springtime  Allopurinol 100 mg    Hypothyroid  Synthroid 50 mg    Colonic inflammation, presumptive diagnosis ulcerative colitis  Not currently taking any medication  Benefiting from a low fiber diet  Real concern is risk of colon cancer, annual colonoscopy showed large polyps each time. She has been counseled on this risk. She really does not want another colonoscopy or any further testing. Even doing the fit kit seem to be a bridge to far. I think that monitoring for anemia would be a reasonable surrogate test.  If that becomes a problem we can revisit    CKD 3  CMP today    Sun damaged skin, skin lesion described in HPI  Expressed my concern about lesion on her hand that refuses to heal.  Risk of skin cancer  Refer to dermatology    Annual wellness visit in the spring  6 months CMP, A1c, TSH      ICD-10-CM ICD-9-CM    1. Essential hypertension I10 401.9 CBC WITH AUTOMATED DIFF   2. Gastroesophageal reflux disease, esophagitis presence not specified K21.9 530.81    3. Ulcerative colitis with complication, unspecified location ([facility] K51.919 556.9    4. Hypothyroidism due to non-medication exogenous substances E03.2 244.3 TSH 3RD GENERATION   5. Glucose intolerance (impaired glucose tolerance) R73.02 790.22 HEMOGLOBIN A1C WITH EAG   6. Chronic kidney disease (CKD), stage III (moderate) (HCC) W70.8 643.1 METABOLIC PANEL, COMPREHENSIVE   7. Non-healing skin lesion L98.9 709.9 REFERRAL TO DERMATOLOGY   8. Sun-damaged skin L57.8 692.79 REFERRAL TO DERMATOLOGY       AVS given.  Pt expressed understanding of instructions

## 2019-11-06 NOTE — PROGRESS NOTES
1. Have you been to the ER, urgent care clinic since your last visit? Hospitalized since your last visit? No    2. Have you seen or consulted any other health care providers outside of the [address] [place] [address] since your last visit? Include any pap smears or colon screening.  No     Health Maintenance Due   Topic Date Due    Shingrix Vaccine Age 49> (1 of 2) 03/26/1986    GLAUCOMA SCREENING Q2Y  03/05/2019    Influenza Age 9 to Adult  08/01/2019

## 2019-11-07 LAB
ALBUMIN SERPL-MCNC: 4.3 G/DL (ref 3.5–4.7)
ALBUMIN/GLOB SERPL: 1.3 {RATIO} (ref 1.2–2.2)
ALP SERPL-CCNC: 80 IU/L (ref 39–117)
ALT SERPL-CCNC: 29 IU/L (ref 0–32)
AST SERPL-CCNC: 32 IU/L (ref 0–40)
BASOPHILS # BLD AUTO: 0.1 X10E3/UL (ref 0–0.2)
BASOPHILS NFR BLD AUTO: 1 %
BILIRUB SERPL-MCNC: 0.5 MG/DL (ref 0–1.2)
BUN SERPL-MCNC: 30 MG/DL (ref 8–27)
BUN/CREAT SERPL: 19 (ref 12–28)
CALCIUM SERPL-MCNC: 10 MG/DL (ref 8.7–10.3)
CHLORIDE SERPL-SCNC: 108 MMOL/L (ref 96–106)
CO2 SERPL-SCNC: 20 MMOL/L (ref 20–29)
CREAT SERPL-MCNC: 1.54 MG/DL (ref 0.57–1)
EOSINOPHIL # BLD AUTO: 0.4 X10E3/UL (ref 0–0.4)
EOSINOPHIL NFR BLD AUTO: 4 %
ERYTHROCYTE [DISTWIDTH] IN BLOOD BY AUTOMATED COUNT: 14.3 % (ref 12.3–15.4)
EST. AVERAGE GLUCOSE BLD GHB EST-MCNC: 137 MG/DL
GLOBULIN SER CALC-MCNC: 3.2 G/DL (ref 1.5–4.5)
GLUCOSE SERPL-MCNC: 122 MG/DL (ref 65–99)
HBA1C MFR BLD: 6.4 % (ref 4.8–5.6)
HCT VFR BLD AUTO: 37.7 % (ref 34–46.6)
HGB BLD-MCNC: 13.1 G/DL (ref 11.1–15.9)
IMM GRANULOCYTES # BLD AUTO: 0 X10E3/UL (ref 0–0.1)
IMM GRANULOCYTES NFR BLD AUTO: 0 %
INTERPRETATION: NORMAL
LYMPHOCYTES # BLD AUTO: 4.6 X10E3/UL (ref 0.7–3.1)
LYMPHOCYTES NFR BLD AUTO: 48 %
MCH RBC QN AUTO: 31.9 PG (ref 26.6–33)
MCHC RBC AUTO-ENTMCNC: 34.7 G/DL (ref 31.5–35.7)
MCV RBC AUTO: 92 FL (ref 79–97)
MONOCYTES # BLD AUTO: 0.8 X10E3/UL (ref 0.1–0.9)
MONOCYTES NFR BLD AUTO: 9 %
NEUTROPHILS # BLD AUTO: 3.6 X10E3/UL (ref 1.4–7)
NEUTROPHILS NFR BLD AUTO: 38 %
PLATELET # BLD AUTO: 232 X10E3/UL (ref 150–450)
POTASSIUM SERPL-SCNC: 4.9 MMOL/L (ref 3.5–5.2)
PROT SERPL-MCNC: 7.5 G/DL (ref 6–8.5)
RBC # BLD AUTO: 4.11 X10E6/UL (ref 3.77–5.28)
SODIUM SERPL-SCNC: 146 MMOL/L (ref 134–144)
TSH SERPL DL<=0.005 MIU/L-ACNC: 2.25 UIU/ML (ref 0.45–4.5)
WBC # BLD AUTO: 9.4 X10E3/UL (ref 3.4–10.8)

## 2019-11-14 ENCOUNTER — OFFICE VISIT (OUTPATIENT)
Dept: DERMATOLOGY | Facility: AMBULATORY SURGERY CENTER | Age: 84
End: 2019-11-14

## 2019-11-14 ENCOUNTER — HOSPITAL ENCOUNTER (OUTPATIENT)
Dept: LAB | Age: 84
Discharge: HOME | End: 2019-11-14

## 2019-11-14 VITALS
OXYGEN SATURATION: 93 % | DIASTOLIC BLOOD PRESSURE: 68 MMHG | RESPIRATION RATE: 16 BRPM | HEIGHT: 64 IN | TEMPERATURE: 97.6 F | WEIGHT: 162 LBS | SYSTOLIC BLOOD PRESSURE: 136 MMHG | BODY MASS INDEX: 27.66 KG/M2 | HEART RATE: 73 BPM

## 2019-11-14 DIAGNOSIS — D48.5 NEOPLASM OF UNCERTAIN BEHAVIOR OF SKIN OF HAND: Primary | ICD-10-CM

## 2019-11-14 DIAGNOSIS — L57.0 ACTINIC KERATOSES: ICD-10-CM

## 2019-11-14 RX ORDER — FLUOROURACIL 50 MG/G
CREAM TOPICAL
Qty: 40 G | Refills: 0 | Status: SHIPPED | OUTPATIENT
Start: 2019-11-14 | End: 2020-09-20

## 2019-11-14 NOTE — PROGRESS NOTES
Written by Yashira Interiano, as dictated by Jm Fung, [address]. Name: Zach Crook       Age: 81 y.o. Date: 11/14/2019    Chief Complaint:   Chief Complaint   Patient presents with    Skin Exam     Pt is c/o lesions on her left hand, nose and right cheek       Subjective:    HPI:  Ms. Shelia San is a 81 y.o. female who presents for the evaluation of a bothersome and tender lesion on the left hand that has existed for many months. She states this will look like it is healing and then break back down. She is also concerned about a red patch on the right side of her nose that she believes has resulted from her glasses touching her nose. She also notes a scaly area on the right cheek. The patient was referred by Dr. Mamta Anderson for this evaluation. She is feeling well and in her usual state of health today. She has no current illnesses, no other skin concerns. Her allergies, medications, medical, and social history are reviewed by me today. The patient's pertinent skin history includes: Has had lesions treated with cryotherapy about 10 years ago and had been under dermatology care of Dr. Ashley Stewart in the past. 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     ROS: Consitutional: Negative  Dermatological : positive for - skin lesion changes    Social History     Socioeconomic History    Marital status:      Spouse name: Not on file    Number of children: Not on file    Years of education: Not on file    Highest education level: Not on file   Occupational History    Not on file   Social Needs    Financial resource strain: Not on file    Food insecurity:     Worry: Not on file     Inability: Not on file    Transportation needs:     Medical: Not on file     Non-medical: Not on file   Tobacco Use    Smoking status: Never Smoker    Smokeless tobacco: Never Used   Substance and Sexual Activity    Alcohol use: No    Drug use: No    Sexual activity: Yes     Partners: Male     Birth control/protection: None   Lifestyle    Physical activity:     Days per week: Not on file     Minutes per session: Not on file    Stress: Not on file   Relationships    Social connections:     Talks on phone: Not on file     Gets together: Not on file     Attends [employer] service: Not on file     Active member of club or organization: Not on file     Attends meetings of clubs or organizations: Not on file     Relationship status: Not on file    Intimate partner violence:     Fear of current or ex partner: Not on file     Emotionally abused: Not on file     Physically abused: Not on file     Forced sexual activity: Not on file   Other Topics Concern    Not on file   Social History Narrative    Not on file       Family History   Problem Relation Age of Onset    Diabetes Daughter     Hypertension Mother     Heart Disease Father        Past Medical History:   Diagnosis Date    Allergic rhinitis     asthma allergies    Arthritis     Chronic kidney disease (CKD), stage III (moderate) ([facility] [address]     GERD (gastroesophageal reflux disease)     Hyperlipidemia     Hypertension     Ill-defined condition     hard of hearing    Sun-damaged skin     Sunburn, blistering     teen    Ulcerative colitis ([facility] [address] 10/28/2014       Past Surgical History:   Procedure Laterality Date    HX CATARACT REMOVAL Bilateral     HX COLONOSCOPY  9/14    ulcerative colitis    HX ORTHOPAEDIC      bilateral knee replacement       Current Outpatient Medications   Medication Sig Dispense Refill    famotidine (PEPCID PO) Take  by mouth as needed.  ACETAMINOPHEN PO Take  by mouth.  IBUPROFEN PO Take  by mouth as needed.  levothyroxine (SYNTHROID) 50 mcg tablet TAKE 1 TABLET DAILY BEFORE BREAKFAST 90 Tab 3    allopurinol (ZYLOPRIM) 100 mg tablet TAKE 1 TABLET DAILY 90 Tab 3    felodipine (PLENDIL SR) 5 mg 24 hr tablet Take 1 Tab by mouth daily.  90 Tab 3    carvedilol (COREG) 3.125 mg tablet TAKE 1 TABLET TWICE A DAY WITH MEALS 180 Tab 3    cholecalciferol (VITAMIN D3) 1,000 unit tablet Take 2 Tabs by mouth daily. 60 Tab 0    azelastine (ASTELIN) 137 mcg (0.1 %) nasal spray USE 1 SPRAY IN EACH NOSTRIL TWICE A DAY AS DIRECTED 1 Bottle 11    omega-3 fatty acids-vitamin e (FISH OIL) 1,000 mg cap Take 1 Cap by mouth.  rosuvastatin (CRESTOR) 5 mg tablet Take 5 mg by mouth nightly. Taking twice a week Mon and Fri         Allergies   Allergen Reactions    Cefdinir Diarrhea    Penicillin G Rash    Statins-Hmg-Coa Reductase Inhibitors Other (comments)     arthralgia         Objective:    Visit Vitals  /68 (BP 1 Location: Left arm, BP Patient Position: Sitting)   Pulse 73   Temp 97.6 °F (36.4 °C) (Oral)   Resp 16   Ht 5' 4\" (1.626 m)   Wt 162 lb (73.5 kg)   LMP  (LMP Unknown)   SpO2 93%   BMI 27.81 kg/m²       Kelsey Johnson is a 81 y.o. female who appears well and in no distress. She is awake, alert, and oriented. A limited skin examination was completed including the face, forearms and hands. She has diffuse thin scaled actinic keratoses on the nose, left cheek, right cheek, right forehead, and mid upper lip including the lesions of her concerns. She has a 13 x 11 keratotic papule on the left hand concerning for SCC,  Including the lesions of her concern. Photos from today's visit:    Left hand    Assessment/Plan:  1. Neoplasm of Uncertain Behavior, left hand, R/O SCC. The differential diagnoses were discussed. A shave biopsy was advised to sample this lesion. The procedure was reviewed and verbal and written consent were obtained. The risks of pain, bleeding, infection, and scar were discussed. The patient is aware that this is a sample and is intended for diagnosis and not therapy of the skin lesion. I performed the procedure. The site was cleansed and anesthetized with 1% Lidocaine with Epinephrine 1:100,000. A shave biopsy was performed to sample the lesion. Drysol was used for hemostasis. The wound was bandaged and care reviewed. The specimen was sent to pathology. I will contact the patient with the results and any further treatment that may be necessary. 2. Actinic Keratoses. The diagnosis of this precancerous lesion related to sun exposure was reviewed. The patient was prescribed Efudex to apply a thin layer to the affected area BID for total 4 weeks taking a 2 week break in the mid portion. Next skin exam: 6 months    This plan was reviewed with the patient and patient agrees. All questions were answered. This scribe documentation was reviewed by me and accurately reflects the examination and decisions made by me. [facility]   OFFICE PROCEDURE PROGRESS NOTE   Chart reviewed for the following:   IJm, have reviewed the History, Physical and updated the Allergic reactions for Zach Diazr. TIME OUT performed immediately prior to start of procedure:   IChloe, [address], have performed the following reviews on Zach Diazr   prior to the start of the procedure:     * Patient was identified by name and date of birth   * Agreement on procedure being performed was verified   * Risks and Benefits explained to the patient   * Procedure site verified and marked as necessary   * Patient was positioned for comfort   * Consent was signed and verified     Time: 1:25pm  Date of procedure: 11/14/2019  Procedure performed by: Ellen Briones.  Marlin White  Provider assisted by: Yasmin Mckenzie LPN  Patient assisted by: self   How tolerated by patient: tolerated the procedure well with no complications   Comments: none

## 2019-11-14 NOTE — PROGRESS NOTES
Room: 6    Identified pt with two pt identifiers(name and ). Reviewed record in preparation for visit and have obtained necessary documentation. All patient medications has been reviewed. Chief Complaint   Patient presents with    Skin Exam     Pt is c/o lesions on her left hand, nose and right cheek       Health Maintenance Due   Topic    Shingrix Vaccine Age 50> (1 of 2)    GLAUCOMA SCREENING Q2Y        Vitals:    19 1301   BP: 136/68   Pulse: 73   Resp: 16   Temp: 97.6 °F (36.4 °C)   TempSrc: Oral   SpO2: 93%   Weight: 73.5 kg (162 lb)   Height: 5' 4\" (1.626 m)   PainSc:   0 - No pain       1. Have you been to the ER, urgent care clinic since your last visit? Hospitalized since your last visit? No    2. Have you seen or consulted any other health care providers outside of the [address] [place] [address] since your last visit? Include any pap smears or colon screening. No    Patient is accompanied by self I have received verbal consent from Nirav Yadav to discuss any/all medical information while they are present in the room.

## 2019-11-19 NOTE — PROGRESS NOTES
I spoke w/ the pt and she is aware of the diagnosis of SCCi on hand. She understands the need for further removal and she was transferred to schedule Mohs.

## 2019-12-03 ENCOUNTER — OFFICE VISIT (OUTPATIENT)
Dept: DERMATOLOGY | Facility: AMBULATORY SURGERY CENTER | Age: 84
End: 2019-12-03

## 2019-12-03 VITALS
TEMPERATURE: 97.5 F | HEIGHT: 64 IN | SYSTOLIC BLOOD PRESSURE: 140 MMHG | DIASTOLIC BLOOD PRESSURE: 80 MMHG | BODY MASS INDEX: 27.66 KG/M2 | HEART RATE: 75 BPM | OXYGEN SATURATION: 94 % | WEIGHT: 162 LBS

## 2019-12-03 DIAGNOSIS — D04.62 SQUAMOUS CELL CARCINOMA IN SITU (SCCIS) OF DORSUM OF LEFT HAND: Primary | ICD-10-CM

## 2019-12-03 NOTE — PATIENT INSTRUCTIONS
WOUND CARE INSTRUCTIONS 1. Keep the dressing clean and dry and do not remove for 48 hours. 2. Then change the dressing once a day as follows: 
a. Wash hands before and after each dressing change. b. Remove dressing and wash site gently with mild soap and water, rinse, and pat dry. 
c. Apply liberal amounts of an ointment (Petroleum jelly or Aquaphor). d. Apply a non-stick (Telfa) dressing or Band-Aid to cover the wound. 3. Watch for: BLEEDING: A small amount of drainage may occur. If bleeding occurs, elevate and rest the surgery site. Apply gauze and steady pressure for 20 minutes. If bleeding continues repeat pressure once more. If bleeding still does not stop, call this office. If after hours, call Dr. Harper Boothe at [phone]. INFECTION: Signs of infection include increased redness, pain, warmth, drainage of pus, and fever. If this occurs, please call this office. 4. Special Instructions (follow any that are checked): 
· [x] You have stitches that DO NOT need to be removed. · [x] Avoid  heavy lifting for two days. · [] Sleep with your head elevated for the next two nights. · [x] Rest the surgery site and keep it elevated as much as possible for two days. · [] You may apply an ice-pack for 10-15 minutes every waking hour for the rest of the day. · [] Eat a soft diet and avoid hot food and hot drinks for the rest of the day. · [] Other instructions: Follow up as directed. Take Tylenol  for pain as needed. Once the site is healed with no remaining bandages or open areas, protect your surgical site and scar from the sun, as this area will be more sensitive. Use a broad spectrum sunscreen SPF 30 or higher daily, and a chemical free product (one containing zinc oxide or titanium dioxide) is a good choice if the area is sensitive. You may begin to gently massage the surgical site in 3 weeks, rubbing in a circular motion along the scar.  This can help reduce swelling and thickness of a scar. If you have any questions or concerns, please call our office Monday through Friday at [phone]. If after hours, please call Dr. Lindsey Reed at [phone].

## 2019-12-03 NOTE — PROGRESS NOTES
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 year old female referred by  Radha Arce NP for Mohs surgery to treat the following lesion:  Lesion Info  Location: left hand  Size: 1.3 cm x 1.1 cm  Type:  squamous in-situ  Duration: months  Path Lab: [facility]  Path #: [id number]  Prior Treatment: none     Symptoms of the lesion include increase in size. ROS:  Suzy Mckenzie is feeling well and in their usual state of health today. She is not in pain. She does not have any other skin concerns. Exam:  Suzy Mckenzie is an awake, alert, oriented, well-appearing female in no distress. The left dorsal hand was examined. Findings are:  On the left dorsal hand there is a crusted scaly plaque. 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 The diagnosis was reviewed. The Mohs surgery procedure was reviewed. Indications, risks, and options were discussed with Ms. Albarran preoperatively. Risks including, but not limited to: pain, bleeding, infection, tumor recurrence, scarring and damage to motor and/or sensory nerves, were discussed. Ms. Tobias Null chose Mohs surgery. Ms. Tobias Null was an acceptable surgery candidate. I performed Mohs surgery using standard technique after verbal and written consent were obtained. The lesion was identified and confirmed with the patient and photograph, if available. The surgical site was marked with gentian violet, prepped, draped and anesthetized in standard fashion. The tumor was debulked by curettage and orientation hashes were placed. The tumor and any associated scar was excised using beveled incision. Hemostasis was achieved, the site was bandaged, and the tissue was transported to the Mohs lab. While maintaining anatomic orientation the tissue was divided, if needed, and marked with colored inks that were noted on the corresponding Mohs map.   The tissue was prepared by Mohs en-face technique for fresh frozen section analysis. The resulting slides were examined for residual tumor, scar and other concerns, all of which were marked on the corresponding Mohs map, if present. The Mohs map was used to guide subsequent stages of surgery, if necessary, and the above process was repeated until a tumor-free plane was achieved. Once the tumor was cleared the map was marked as such and signed. Dr. Grupo Fragoso acted as surgeon and pathologist for the entire case, performing all stages of the surgical excision as well as examination and interpretation of the histologic slides. See table below for details regarding the surgical case. 1 stage(s) were required to reach a tumor-free plane, resulting in a 1.9 cm x 1.6 cm defect extending to the subcutaneous tissue. There were not complications. Jennifer Oh will follow up as needed in the postoperative period. Regular skin examinations will be with  Donaldo Cedillo NP. The wound management options of second intent healing, layered closure, local flap, and/or full thickness skin graft were discussed. Ms. Thornton Ahr understands the aims, risks, alternatives, and possible complications and elects to proceed with a complex layered closure. Wound margins were debeveled, edges widely undermined in the subcutaneous plane, and standing cones corrected at both poles followed by complex layered closure. The wound was closed with buried 4-0 vicryl suture in the muscle and deep subcutis to reduce width of the wound and a second layer in the dermis to reduce tension on the skin edges with careful attention to edge apposition and eversion for optimal cosmesis. Epidermal edges were carefully approximated with 5-0 fast absorbing gut suture, again with careful attention to apposition and eversion. The final closure length was Closure Length: 3.7 cm. The wound was bandaged with Petrolatum ointment, Telfa, gauze and Coverroll.  Wound care instructions (written and/or verbal) and a follow up appointment were given to Ms. Christopher Snider before discharge. Ms. Christopher Snider was discharged in good condition. left hand  Mohs Lesion Operative Report  Date: 12/03/19  Room: Procedure room 1  Indications: Site, Size  Pre-op Meds: none  Pre-op BP: 132/76  Pre-op pulse: 72  1st Assistant: Jenna Azul  Stage #: 1  Stage 1 Sections: 1  Stage 1 # Pos: 0  Perineural Involvement: No  Lymphadenopathy: No  Defect Size: 1.9 cm x 1.6 cm  Depth: subcutaneous tissue  Wound Mgt: complex  Suture: Buried, Surface  Buried details: 4.0 vicryl  Surface Details: 5.0 fast gut  Undermining: SubQ  Closure Length: 3.7 cm  Estimated Blood Loss: 2 ml  Hemostasis: Electrosurgery, Pressure  Anesthesia: 1% Lidocaine w/1:100,000 epi  1% Lidocaine: 7 cc  Complications: none  Dressing: pressure  Post-op BP: 140/80  Post-op Pulse: 75  Pos-op Meds: None  W/C Instructions: Verbal, Written  Follow-up: 1 month     [facility]   OFFICE PROCEDURE PROGRESS NOTE   Chart reviewed for the following:   IDelio MD have reviewed the History, Physical and updated the Allergic reactions for Survata. TIME OUT performed immediately prior to start of procedure:   Laura Sweeney MD, have performed the following reviews on Survata   prior to the start of the procedure:     * Patient was identified by name and date of birth   * Agreement on procedure being performed was verified   * Risks and Benefits explained to the patient   * Procedure site verified and marked as necessary   * Patient was positioned for comfort   * Consent was signed and verified     Time: 1:30 PM  Date of procedure: 12/3/2019  Procedure performed by:  Delio Kohler MD  Provider assisted by: Jenna Azul  Patient assisted by: self   How tolerated by patient: tolerated the procedure well with no complications   Comments: none

## 2020-01-07 ENCOUNTER — OFFICE VISIT (OUTPATIENT)
Dept: DERMATOLOGY | Facility: AMBULATORY SURGERY CENTER | Age: 85
End: 2020-01-07

## 2020-01-07 DIAGNOSIS — D04.62 SQUAMOUS CELL CARCINOMA IN SITU (SCCIS) OF DORSUM OF LEFT HAND: Primary | ICD-10-CM

## 2020-01-07 NOTE — PROGRESS NOTES
Jeimy Worley is a 81 y.o. 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 The wound has healed well and she has had no problems with the site. She notes a lesion near the scar that has appeared over the past several weeks. It is scaly but is otherwise not tender or itchy. She has similar lesions on her right dorsal hand. These lesions have never been treated in the past.      Physical Exam  HENT:      Head: Normocephalic. Pulmonary:      Effort: Pulmonary effort is normal.   Skin:     Comments: On the left dorsal hand there is a well-healed surgical scar. Near the surgical scar there is a keratotic papule. There are scattered scaly papules on the right dorsal hand. Neurological:      Mental Status: She is alert and oriented to person, place, and time. 1.  Actinic keratosis  Actinic keratoses. The diagnosis of this precancerous lesion related to sun exposure was reviewed. I treated 4 lesions with cryotherapy after verbal consent was obtained and post-cryotherapy care was reviewed. 2. 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 The lesion that has appeared next to it is an actinic keratosis and was treated as above using cryotherapy. No further treatment is needed for this lesion. Follow-up and Dispositions    · Return in about 3 months (around 4/7/2020) for Skin check with Ezzie Dakins.          Robert Aguilar MD

## 2020-01-13 RX ORDER — CARVEDILOL 3.12 MG/1
TABLET ORAL
Qty: 180 TAB | Refills: 4 | Status: SHIPPED | OUTPATIENT
Start: 2020-01-13 | End: 2021-04-19 | Stop reason: SDUPTHER

## 2020-01-27 DIAGNOSIS — J30.9 ALLERGIC RHINITIS, UNSPECIFIED SEASONALITY, UNSPECIFIED TRIGGER: Primary | ICD-10-CM

## 2020-01-27 DIAGNOSIS — J30.9 ALLERGIC RHINITIS: ICD-10-CM

## 2020-01-27 RX ORDER — AZELASTINE 1 MG/ML
SPRAY, METERED NASAL
Qty: 3 BOTTLE | Refills: 3 | Status: SHIPPED | OUTPATIENT
Start: 2020-01-27 | End: 2022-06-29

## 2020-01-27 NOTE — TELEPHONE ENCOUNTER
Message from [facility]    Dr. Sabrina Simmons   Received: Today   Message Contents   White, [address] Office Pool                   Needs refill for Azelastine. Pharmacy is [facility] . Best contact number is [phone].       Requested Prescriptions     Pending Prescriptions Disp Refills    azelastine (ASTELIN) 137 mcg (0.1 %) nasal spray 1 Bottle 11     Sig: Use in each nostril as directed

## 2020-03-02 RX ORDER — FELODIPINE 5 MG/1
TABLET, EXTENDED RELEASE ORAL
Qty: 90 TAB | Refills: 4 | Status: SHIPPED | OUTPATIENT
Start: 2020-03-02 | End: 2021-04-19 | Stop reason: SDUPTHER

## 2020-07-08 RX ORDER — LEVOTHYROXINE SODIUM 50 UG/1
TABLET ORAL
Qty: 90 TAB | Refills: 3 | Status: SHIPPED | OUTPATIENT
Start: 2020-07-08 | End: 2021-04-19 | Stop reason: SDUPTHER

## 2020-07-08 RX ORDER — ALLOPURINOL 100 MG/1
TABLET ORAL
Qty: 90 TAB | Refills: 3 | Status: SHIPPED | OUTPATIENT
Start: 2020-07-08 | End: 2021-04-19 | Stop reason: SDUPTHER

## 2020-08-19 ENCOUNTER — HOSPITAL ENCOUNTER (OUTPATIENT)
Dept: LAB | Age: 85
Discharge: HOME | End: 2020-08-19
Payer: MEDICARE

## 2020-08-19 ENCOUNTER — OFFICE VISIT (OUTPATIENT)
Dept: FAMILY MEDICINE CLINIC | Age: 85
End: 2020-08-19
Payer: MEDICARE

## 2020-08-19 VITALS
HEIGHT: 64 IN | BODY MASS INDEX: 27.83 KG/M2 | OXYGEN SATURATION: 94 % | WEIGHT: 163 LBS | RESPIRATION RATE: 20 BRPM | TEMPERATURE: 96.6 F | SYSTOLIC BLOOD PRESSURE: 123 MMHG | HEART RATE: 84 BPM | DIASTOLIC BLOOD PRESSURE: 75 MMHG

## 2020-08-19 DIAGNOSIS — R73.02 GLUCOSE INTOLERANCE (IMPAIRED GLUCOSE TOLERANCE): ICD-10-CM

## 2020-08-19 DIAGNOSIS — E03.2 HYPOTHYROIDISM DUE TO NON-MEDICATION EXOGENOUS SUBSTANCES: ICD-10-CM

## 2020-08-19 DIAGNOSIS — I10 ESSENTIAL HYPERTENSION: ICD-10-CM

## 2020-08-19 DIAGNOSIS — K51.919 ULCERATIVE COLITIS WITH COMPLICATION, UNSPECIFIED LOCATION (HCC): ICD-10-CM

## 2020-08-19 DIAGNOSIS — Z00.00 ROUTINE GENERAL MEDICAL EXAMINATION AT A HEALTH CARE FACILITY: Primary | ICD-10-CM

## 2020-08-19 DIAGNOSIS — N18.30 CHRONIC KIDNEY DISEASE (CKD), STAGE III (MODERATE) (HCC): ICD-10-CM

## 2020-08-19 PROCEDURE — 83036 HEMOGLOBIN GLYCOSYLATED A1C: CPT

## 2020-08-19 PROCEDURE — G8754 DIAS BP LESS 90: HCPCS | Performed by: FAMILY MEDICINE

## 2020-08-19 PROCEDURE — 99000 SPECIMEN HANDLING OFFICE-LAB: CPT | Performed by: FAMILY MEDICINE

## 2020-08-19 PROCEDURE — 1090F PRES/ABSN URINE INCON ASSESS: CPT | Performed by: FAMILY MEDICINE

## 2020-08-19 PROCEDURE — G8427 DOCREV CUR MEDS BY ELIG CLIN: HCPCS | Performed by: FAMILY MEDICINE

## 2020-08-19 PROCEDURE — 99214 OFFICE O/P EST MOD 30 MIN: CPT | Performed by: FAMILY MEDICINE

## 2020-08-19 PROCEDURE — G8399 PT W/DXA RESULTS DOCUMENT: HCPCS | Performed by: FAMILY MEDICINE

## 2020-08-19 PROCEDURE — G0439 PPPS, SUBSEQ VISIT: HCPCS | Performed by: FAMILY MEDICINE

## 2020-08-19 PROCEDURE — G8419 CALC BMI OUT NRM PARAM NOF/U: HCPCS | Performed by: FAMILY MEDICINE

## 2020-08-19 PROCEDURE — G8510 SCR DEP NEG, NO PLAN REQD: HCPCS | Performed by: FAMILY MEDICINE

## 2020-08-19 PROCEDURE — G0463 HOSPITAL OUTPT CLINIC VISIT: HCPCS | Performed by: FAMILY MEDICINE

## 2020-08-19 PROCEDURE — 80053 COMPREHEN METABOLIC PANEL: CPT

## 2020-08-19 PROCEDURE — 85025 COMPLETE CBC W/AUTO DIFF WBC: CPT

## 2020-08-19 PROCEDURE — 1101F PT FALLS ASSESS-DOCD LE1/YR: CPT | Performed by: FAMILY MEDICINE

## 2020-08-19 PROCEDURE — G8752 SYS BP LESS 140: HCPCS | Performed by: FAMILY MEDICINE

## 2020-08-19 PROCEDURE — G8536 NO DOC ELDER MAL SCRN: HCPCS | Performed by: FAMILY MEDICINE

## 2020-08-19 PROCEDURE — 36415 COLL VENOUS BLD VENIPUNCTURE: CPT | Performed by: FAMILY MEDICINE

## 2020-08-19 PROCEDURE — G0444 DEPRESSION SCREEN ANNUAL: HCPCS | Performed by: FAMILY MEDICINE

## 2020-08-19 PROCEDURE — 84443 ASSAY THYROID STIM HORMONE: CPT

## 2020-08-19 NOTE — PROGRESS NOTES
Medicare Annual Wellness Visit    I have reviewed the patient's medical history in detail and updated the computerized patient record. History   Kelechi Fuentes is a 81 y.o. female who presents for follow-up on chronic medical issues. See discussion    She has history of GI issues. Gas and diarrhea. These are doing better since she has cut out letuce. She is not complaining of any stomach problems today. Was last seen by gastroenterology in March 2019. She had annual colonoscopies since 2014 each time showing relatively large polyps, ulceration, inflammation. A presumptive diagnosis of ulcerative colitis was made but I do not see that this was confirmed by biopsy but I am not sure that the right kind of biopsy was done since that was the plan for a follow-up colonoscopy. She does not want any more colonoscopies because of her age and the hassle.   She has been counseled on the risk of colon cancer    Past Medical History:   Diagnosis Date    Allergic rhinitis     asthma allergies    Arthritis     Chronic kidney disease (CKD), stage III (moderate) (HCC)     GERD (gastroesophageal reflux disease)     Hyperlipidemia     Hypertension     Ill-defined condition     hard of hearing    Sun-damaged skin     Sunburn, blistering     teen    Ulcerative colitis ([facility] [address] 10/28/2014      Past Surgical History:   Procedure Laterality Date    HX CATARACT REMOVAL Bilateral     HX COLONOSCOPY  9/14    ulcerative colitis    HX ORTHOPAEDIC      bilateral knee replacement     Current Outpatient Medications   Medication Sig Dispense Refill    allopurinoL (ZYLOPRIM) 100 mg tablet TAKE 1 TABLET DAILY 90 Tab 3    levothyroxine (SYNTHROID) 50 mcg tablet TAKE 1 TABLET DAILY BEFORE BREAKFAST 90 Tab 3    felodipine (PLENDIL SR) 5 mg 24 hr tablet TAKE 1 TABLET DAILY 90 Tab 4    azelastine (ASTELIN) 137 mcg (0.1 %) nasal spray USE 1 SPRAY IN EACH NOSTRIL TWICE A DAY 3 Bottle 3    carvedilol (COREG) 3.125 mg tablet TAKE 1 TABLET TWICE A DAY WITH MEALS 180 Tab 4    fluorouracil (EFUDEX) 5 % chemo cream Apply a thin layer twice daily for 2 weeks the stop for 2 weeks and then apply for 2 weeks again. 40 g 0    famotidine (PEPCID PO) Take  by mouth as needed.  ACETAMINOPHEN PO Take  by mouth.  IBUPROFEN PO Take  by mouth as needed.  cholecalciferol (VITAMIN D3) 1,000 unit tablet Take 2 Tabs by mouth daily. 60 Tab 0    omega-3 fatty acids-vitamin e (FISH OIL) 1,000 mg cap Take 1 Cap by mouth.  rosuvastatin (CRESTOR) 5 mg tablet Take 5 mg by mouth nightly.  Taking twice a week Mon and Fri       Allergies   Allergen Reactions    Cefdinir Diarrhea    Penicillin G Rash    Statins-Hmg-Coa Reductase Inhibitors Other (comments)     arthralgia     Family History   Problem Relation Age of Onset    Diabetes Daughter     Hypertension Mother     Heart Disease Father      Social History     Tobacco Use    Smoking status: Never Smoker    Smokeless tobacco: Never Used   Substance Use Topics    Alcohol use: No     Patient Active Problem List   Diagnosis Code    Hyperlipidemia E78.5    GERD (gastroesophageal reflux disease) K21.9    Hypertension I10    Allergic rhinitis J30.9    Chronic kidney disease (CKD), stage III (moderate) (HCC) N18.3    Hypothyroid E03.9    Vitamin D deficiency E55.9    Ulcerative colitis ([facility] [address] K51.90    Statin intolerance Z78.9    Glucose intolerance (impaired glucose tolerance) R73.02    Gout M10.9       Depression Risk Factor Screening:     3 most recent PHQ Screens 8/19/2020   Little interest or pleasure in doing things Not at all   Feeling down, depressed, irritable, or hopeless Not at all   Total Score PHQ 2 0   Trouble falling or staying asleep, or sleeping too much -   Feeling tired or having little energy -   Poor appetite, weight loss, or overeating -   Feeling bad about yourself - or that you are a failure or have let yourself or your family down -   Trouble concentrating on things such as school, work, reading, or watching TV -   Moving or speaking so slowly that other people could have noticed; or the opposite being so fidgety that others notice -   Thoughts of being better off dead, or hurting yourself in some way -   PHQ 9 Score -   How difficult have these problems made it for you to do your work, take care of your home and get along with others -     Alcohol Risk Factor Screening: On any occasion during the past 3 months, have you had more than 3 drinks containing alcohol? No    Do you average more than 7 drinks per week? No      Functional Ability and Level of Safety:     Hearing Loss   none    Activities of Daily Living   Self-care. Requires assistance with: no ADLs    Fall Risk     Fall Risk Assessment, last 12 mths 8/19/2020   Able to walk? Yes   Fall in past 12 months? No   Fall with injury? -   Number of falls in past 12 months -   Fall Risk Score -     Abuse Screen   Patient is not abused    Review of Systems   ROS:  As listed in HPI. In addition:  Constitutional:   No headache, fever, fatigue, weight loss or weight gain      Eyes:   No redness, pruritis, pain, visual changes, swelling, or discharge      Ears:    No pain, loss or changes in hearing     Cardiac:    No chest pain      Resp:   No cough or shortness of breath      Neuro   No loss of consciousness, dizziness, seizure    Physical Examination     Evaluation of Cognitive Function:  Mood/affect:  happy  Appearance: age appropriate  Family member/caregiver input:     Physical Exam:  Blood pressure 153/83, pulse 84, temperature (!) 96.6 °F (35.9 °C), temperature source Temporal, resp. rate 20, height 5' 4\" (1.626 m), weight 163 lb (73.9 kg), SpO2 94 %. GEN: No apparent distress. Alert and oriented and responds to all questions appropriately.         LUNGS: Respirations unlabored; clear to auscultation bilaterally  CARDIOVASCULAR: Regular, rate, and rhythm without murmurs   ABDOMEN: Soft; nontender; nondistended; normoactive bowel sounds; no masses or organomegaly  NEUROLOGIC:  No focal neurologic deficits. Strength and sensation grossly intact. Coordination and gait grossly intact. EXT: Well perfused. No edema. SKIN: No obvious rashes. Patient Care Team:  Mike Gonzalez MD as PCP - General (Family Medicine)  Юлия Herring MD as PCP - [facility] Empaneled Provider    Advice/Referrals/Counseling   Education and counseling provided:    Recommend flu shot    Assessment/Plan     ? Recurrent UTI  Has been to [facility] every 3 months with a UTI. Offered urology referral, declined    Right hand carpal tunnel  Establish with hand surgeon, getting periodic injections. Educated on using brace    Arm muscle aches. Apparently present for years, she blames cholesterol medicine, only taking Crestor twice a week and stopping medicine in the past has not helped  Cannot be more specific about her symptoms    Hypertension  Well-controlled  Carvedilol 3.125 mg - suggested she stop  Felodipine 5mg     Hyperlipidemia  Crestor 5 mg- suggested she stop  She is taking this for primary prevention.     Gout  Uric acid was at goal in the springtime  Allopurinol 100 mg     Hypothyroid  Synthroid 50 mg     Colonic inflammation, presumptive diagnosis ulcerative colitis  Not currently taking any medication  Benefiting from a low fiber diet  Real concern is risk of colon cancer, annual colonoscopy showed large polyps each time. She has been counseled on this risk. She really does not want another colonoscopy or any further testing. Even doing the fit kit seem to be a bridge to far. I think that monitoring for anemia would be a reasonable surrogate test.  If that becomes a problem we can revisit     CKD 3  CMP today        ICD-10-CM ICD-9-CM    1. Routine general medical examination at a health care facility  Z00.00 V70.0    2.  Essential hypertension  I10 401.9 CBC WITH AUTOMATED DIFF      METABOLIC PANEL, COMPREHENSIVE      TSH 3RD GENERATION   3. Hypothyroidism due to non-medication exogenous substances  E03.2 244.3    4. Chronic kidney disease (CKD), stage III (moderate) (HCC)  N18.3 585.3    5. Glucose intolerance (impaired glucose tolerance)  R73.02 790.22 HEMOGLOBIN A1C WITH EAG   6.  Ulcerative colitis with complication, unspecified location ([facility]  K51.919 556.9

## 2020-08-19 NOTE — PROGRESS NOTES
Chief Complaint   Patient presents with    Hypertension     follow-up      Health Maintenance reviewed     1. Have you been to the ER, urgent care clinic since your last visit? Hospitalized since your last visit? No     2. Have you seen or consulted any other health care providers outside of the [address] [place] [address] since your last visit? Include any pap smears or colon screening.  No

## 2020-08-20 LAB
ALBUMIN SERPL-MCNC: 4.4 G/DL (ref 3.6–4.6)
ALBUMIN/GLOB SERPL: 1.4 {RATIO} (ref 1.2–2.2)
ALP SERPL-CCNC: 73 IU/L (ref 39–117)
ALT SERPL-CCNC: 21 IU/L (ref 0–32)
AST SERPL-CCNC: 35 IU/L (ref 0–40)
BASOPHILS # BLD AUTO: 0.1 X10E3/UL (ref 0–0.2)
BASOPHILS NFR BLD AUTO: 1 %
BILIRUB SERPL-MCNC: 0.6 MG/DL (ref 0–1.2)
BUN SERPL-MCNC: 29 MG/DL (ref 8–27)
BUN/CREAT SERPL: 20 (ref 12–28)
CALCIUM SERPL-MCNC: 10.1 MG/DL (ref 8.7–10.3)
CHLORIDE SERPL-SCNC: 102 MMOL/L (ref 96–106)
CO2 SERPL-SCNC: 22 MMOL/L (ref 20–29)
CREAT SERPL-MCNC: 1.47 MG/DL (ref 0.57–1)
EOSINOPHIL # BLD AUTO: 0.3 X10E3/UL (ref 0–0.4)
EOSINOPHIL NFR BLD AUTO: 3 %
ERYTHROCYTE [DISTWIDTH] IN BLOOD BY AUTOMATED COUNT: 14.2 % (ref 11.7–15.4)
EST. AVERAGE GLUCOSE BLD GHB EST-MCNC: 131 MG/DL
GLOBULIN SER CALC-MCNC: 3.1 G/DL (ref 1.5–4.5)
GLUCOSE SERPL-MCNC: 97 MG/DL (ref 65–99)
HBA1C MFR BLD: 6.2 % (ref 4.8–5.6)
HCT VFR BLD AUTO: 38.7 % (ref 34–46.6)
HGB BLD-MCNC: 12.8 G/DL (ref 11.1–15.9)
IMM GRANULOCYTES # BLD AUTO: 0 X10E3/UL (ref 0–0.1)
IMM GRANULOCYTES NFR BLD AUTO: 0 %
INTERPRETATION: NORMAL
LYMPHOCYTES # BLD AUTO: 6.7 X10E3/UL (ref 0.7–3.1)
LYMPHOCYTES NFR BLD AUTO: 57 %
MCH RBC QN AUTO: 31.6 PG (ref 26.6–33)
MCHC RBC AUTO-ENTMCNC: 33.1 G/DL (ref 31.5–35.7)
MCV RBC AUTO: 96 FL (ref 79–97)
MONOCYTES # BLD AUTO: 0.9 X10E3/UL (ref 0.1–0.9)
MONOCYTES NFR BLD AUTO: 8 %
NEUTROPHILS # BLD AUTO: 3.7 X10E3/UL (ref 1.4–7)
NEUTROPHILS NFR BLD AUTO: 31 %
PLATELET # BLD AUTO: 218 X10E3/UL (ref 150–450)
POTASSIUM SERPL-SCNC: 4.9 MMOL/L (ref 3.5–5.2)
PROT SERPL-MCNC: 7.5 G/DL (ref 6–8.5)
RBC # BLD AUTO: 4.05 X10E6/UL (ref 3.77–5.28)
SODIUM SERPL-SCNC: 139 MMOL/L (ref 134–144)
TSH SERPL DL<=0.005 MIU/L-ACNC: 1.81 UIU/ML (ref 0.45–4.5)
WBC # BLD AUTO: 11.7 X10E3/UL (ref 3.4–10.8)

## 2020-09-02 ENCOUNTER — APPOINTMENT (OUTPATIENT)
Dept: CT IMAGING | Age: 85
End: 2020-09-02
Attending: EMERGENCY MEDICINE
Payer: MEDICARE

## 2020-09-02 ENCOUNTER — HOSPITAL ENCOUNTER (EMERGENCY)
Age: 85
Discharge: HOME | End: 2020-09-02
Attending: EMERGENCY MEDICINE
Payer: MEDICARE

## 2020-09-02 VITALS
TEMPERATURE: 97.5 F | OXYGEN SATURATION: 98 % | HEIGHT: 62 IN | DIASTOLIC BLOOD PRESSURE: 70 MMHG | WEIGHT: 160.5 LBS | HEART RATE: 89 BPM | RESPIRATION RATE: 16 BRPM | BODY MASS INDEX: 29.53 KG/M2 | SYSTOLIC BLOOD PRESSURE: 130 MMHG

## 2020-09-02 DIAGNOSIS — N12 PYELONEPHRITIS: Primary | ICD-10-CM

## 2020-09-02 DIAGNOSIS — K59.00 CONSTIPATION, UNSPECIFIED CONSTIPATION TYPE: ICD-10-CM

## 2020-09-02 LAB
ALBUMIN SERPL-MCNC: 3.5 G/DL (ref 3.5–5)
ALBUMIN/GLOB SERPL: 0.8 {RATIO} (ref 1.1–2.2)
ALP SERPL-CCNC: 88 U/L (ref 45–117)
ALT SERPL-CCNC: 32 U/L (ref 12–78)
ANION GAP SERPL CALC-SCNC: 6 MMOL/L (ref 5–15)
APPEARANCE UR: ABNORMAL
AST SERPL-CCNC: 25 U/L (ref 15–37)
BACTERIA URNS QL MICRO: ABNORMAL /HPF
BASOPHILS # BLD: 0.1 K/UL (ref 0–0.1)
BASOPHILS NFR BLD: 1 % (ref 0–1)
BILIRUB SERPL-MCNC: 0.9 MG/DL (ref 0.2–1)
BILIRUB UR QL: NEGATIVE
BUN SERPL-MCNC: 34 MG/DL (ref 6–20)
BUN/CREAT SERPL: 20 (ref 12–20)
CALCIUM SERPL-MCNC: 10 MG/DL (ref 8.5–10.1)
CHLORIDE SERPL-SCNC: 105 MMOL/L (ref 97–108)
CO2 SERPL-SCNC: 26 MMOL/L (ref 21–32)
COLOR UR: ABNORMAL
CREAT SERPL-MCNC: 1.72 MG/DL (ref 0.55–1.02)
DIFFERENTIAL METHOD BLD: ABNORMAL
EOSINOPHIL # BLD: 0.4 K/UL (ref 0–0.4)
EOSINOPHIL NFR BLD: 3 % (ref 0–7)
EPITH CASTS URNS QL MICRO: ABNORMAL /LPF
ERYTHROCYTE [DISTWIDTH] IN BLOOD BY AUTOMATED COUNT: 15.2 % (ref 11.5–14.5)
GLOBULIN SER CALC-MCNC: 4.6 G/DL (ref 2–4)
GLUCOSE SERPL-MCNC: 113 MG/DL (ref 65–100)
GLUCOSE UR STRIP.AUTO-MCNC: NEGATIVE MG/DL
HCT VFR BLD AUTO: 41.2 % (ref 35–47)
HEMOCCULT STL QL: NEGATIVE
HGB BLD-MCNC: 13.6 G/DL (ref 11.5–16)
HGB UR QL STRIP: ABNORMAL
HYALINE CASTS URNS QL MICRO: ABNORMAL /LPF (ref 0–5)
IMM GRANULOCYTES # BLD AUTO: 0 K/UL (ref 0–0.04)
IMM GRANULOCYTES NFR BLD AUTO: 0 % (ref 0–0.5)
KETONES UR QL STRIP.AUTO: NEGATIVE MG/DL
LEUKOCYTE ESTERASE UR QL STRIP.AUTO: ABNORMAL
LIPASE SERPL-CCNC: 38 U/L (ref 73–393)
LYMPHOCYTES # BLD: 6.2 K/UL (ref 0.8–3.5)
LYMPHOCYTES NFR BLD: 49 % (ref 12–49)
MCH RBC QN AUTO: 32 PG (ref 26–34)
MCHC RBC AUTO-ENTMCNC: 33 G/DL (ref 30–36.5)
MCV RBC AUTO: 96.9 FL (ref 80–99)
MONOCYTES # BLD: 1 K/UL (ref 0–1)
MONOCYTES NFR BLD: 8 % (ref 5–13)
NEUTS SEG # BLD: 5 K/UL (ref 1.8–8)
NEUTS SEG NFR BLD: 39 % (ref 32–75)
NITRITE UR QL STRIP.AUTO: POSITIVE
NRBC # BLD: 0 K/UL (ref 0–0.01)
NRBC BLD-RTO: 0 PER 100 WBC
PH UR STRIP: 5.5 [PH] (ref 5–8)
PLATELET # BLD AUTO: 222 K/UL (ref 150–400)
PMV BLD AUTO: 12.3 FL (ref 8.9–12.9)
POTASSIUM SERPL-SCNC: 4.6 MMOL/L (ref 3.5–5.1)
PROT SERPL-MCNC: 8.1 G/DL (ref 6.4–8.2)
PROT UR STRIP-MCNC: NEGATIVE MG/DL
RBC # BLD AUTO: 4.25 M/UL (ref 3.8–5.2)
RBC #/AREA URNS HPF: ABNORMAL /HPF (ref 0–5)
RBC MORPH BLD: ABNORMAL
SODIUM SERPL-SCNC: 137 MMOL/L (ref 136–145)
SP GR UR REFRACTOMETRY: 1.01 (ref 1–1.03)
UA: UC IF INDICATED,UAUC: ABNORMAL
UROBILINOGEN UR QL STRIP.AUTO: 0.2 EU/DL (ref 0.2–1)
WBC # BLD AUTO: 12.7 K/UL (ref 3.6–11)
WBC MORPH BLD: ABNORMAL
WBC URNS QL MICRO: >100 /HPF (ref 0–4)

## 2020-09-02 PROCEDURE — 74011000258 HC RX REV CODE- 258: Performed by: EMERGENCY MEDICINE

## 2020-09-02 PROCEDURE — 83690 ASSAY OF LIPASE: CPT

## 2020-09-02 PROCEDURE — 74011250636 HC RX REV CODE- 250/636: Performed by: EMERGENCY MEDICINE

## 2020-09-02 PROCEDURE — 82272 OCCULT BLD FECES 1-3 TESTS: CPT

## 2020-09-02 PROCEDURE — 99283 EMERGENCY DEPT VISIT LOW MDM: CPT

## 2020-09-02 PROCEDURE — 96365 THER/PROPH/DIAG IV INF INIT: CPT

## 2020-09-02 PROCEDURE — 87077 CULTURE AEROBIC IDENTIFY: CPT

## 2020-09-02 PROCEDURE — 81001 URINALYSIS AUTO W/SCOPE: CPT

## 2020-09-02 PROCEDURE — 80053 COMPREHEN METABOLIC PANEL: CPT

## 2020-09-02 PROCEDURE — 74011250637 HC RX REV CODE- 250/637: Performed by: EMERGENCY MEDICINE

## 2020-09-02 PROCEDURE — 36415 COLL VENOUS BLD VENIPUNCTURE: CPT

## 2020-09-02 PROCEDURE — 87186 SC STD MICRODIL/AGAR DIL: CPT

## 2020-09-02 PROCEDURE — 87086 URINE CULTURE/COLONY COUNT: CPT

## 2020-09-02 PROCEDURE — 74011000636 HC RX REV CODE- 636: Performed by: EMERGENCY MEDICINE

## 2020-09-02 PROCEDURE — 85025 COMPLETE CBC W/AUTO DIFF WBC: CPT

## 2020-09-02 PROCEDURE — 96361 HYDRATE IV INFUSION ADD-ON: CPT

## 2020-09-02 PROCEDURE — 74177 CT ABD & PELVIS W/CONTRAST: CPT

## 2020-09-02 RX ORDER — SULFAMETHOXAZOLE AND TRIMETHOPRIM 800; 160 MG/1; MG/1
1 TABLET ORAL 2 TIMES DAILY
Qty: 20 TAB | Refills: 0 | Status: SHIPPED | OUTPATIENT
Start: 2020-09-02 | End: 2020-09-12

## 2020-09-02 RX ORDER — SODIUM CHLORIDE 0.9 % (FLUSH) 0.9 %
10 SYRINGE (ML) INJECTION
Status: COMPLETED | OUTPATIENT
Start: 2020-09-02 | End: 2020-09-02

## 2020-09-02 RX ORDER — MAGNESIUM CITRATE
296 SOLUTION, ORAL ORAL
Status: COMPLETED | OUTPATIENT
Start: 2020-09-02 | End: 2020-09-02

## 2020-09-02 RX ADMIN — Medication 10 ML: at 13:59

## 2020-09-02 RX ADMIN — IOHEXOL 50 ML: 240 INJECTION, SOLUTION INTRATHECAL; INTRAVASCULAR; INTRAVENOUS; ORAL at 13:14

## 2020-09-02 RX ADMIN — CEFTRIAXONE 1 G: 1 INJECTION, POWDER, FOR SOLUTION INTRAMUSCULAR; INTRAVENOUS at 14:55

## 2020-09-02 RX ADMIN — Medication 296 ML: at 15:59

## 2020-09-02 RX ADMIN — IOPAMIDOL 100 ML: 755 INJECTION, SOLUTION INTRAVENOUS at 14:48

## 2020-09-02 RX ADMIN — SODIUM CHLORIDE 500 ML: 900 INJECTION, SOLUTION INTRAVENOUS at 14:59

## 2020-09-02 NOTE — ED NOTES
Verbal and written discharge instructions given to patient. Home with family. To waiting room via wheelchair.

## 2020-09-02 NOTE — ED PROVIDER NOTES
EMERGENCY DEPARTMENT HISTORY AND PHYSICAL EXAM      Date: 9/2/2020  Patient Name: Rhonda Angel    History of Presenting Illness     Chief Complaint   Patient presents with    Constipation     No BM since Friday.  Back Pain     righ tlower back pain x 3 days. Currently on abx for UTI. History Provided By: Patient and Patient's     HPI: Rhonda Angel, 81 y.o. female presents to the ED with cc of abdominal bloating. Patient reports symptoms began 3 days ago. She reports she is currently being treated with Cipro for a UTI. Reports initially noted right flank pain that then became bilateral lower abdominal cramping. Patient denies any dysuria, but does endorse increased urinary frequency. Patient denies any history of intra-abdominal surgeries. Patient reports decreased flatus and constipation. No vomiting, but does report nausea and decreased appetite. No fevers. Patient denies chest pain. Patient has tried over-the-counter bowel regimen without relief. There are no other complaints, changes, or physical findings at this time. PCP: Martina Cottrell MD    No current facility-administered medications on file prior to encounter. Current Outpatient Medications on File Prior to Encounter   Medication Sig Dispense Refill    allopurinoL (ZYLOPRIM) 100 mg tablet TAKE 1 TABLET DAILY 90 Tab 3    levothyroxine (SYNTHROID) 50 mcg tablet TAKE 1 TABLET DAILY BEFORE BREAKFAST 90 Tab 3    felodipine (PLENDIL SR) 5 mg 24 hr tablet TAKE 1 TABLET DAILY 90 Tab 4    azelastine (ASTELIN) 137 mcg (0.1 %) nasal spray USE 1 SPRAY IN EACH NOSTRIL TWICE A DAY 3 Bottle 3    carvedilol (COREG) 3.125 mg tablet TAKE 1 TABLET TWICE A DAY WITH MEALS 180 Tab 4    fluorouracil (EFUDEX) 5 % chemo cream Apply a thin layer twice daily for 2 weeks the stop for 2 weeks and then apply for 2 weeks again. 40 g 0    famotidine (PEPCID PO) Take  by mouth as needed.  ACETAMINOPHEN PO Take  by mouth.       IBUPROFEN PO Take  by mouth as needed.  cholecalciferol (VITAMIN D3) 1,000 unit tablet Take 2 Tabs by mouth daily. 60 Tab 0    omega-3 fatty acids-vitamin e (FISH OIL) 1,000 mg cap Take 1 Cap by mouth.  rosuvastatin (CRESTOR) 5 mg tablet Take 5 mg by mouth nightly. Taking twice a week Mon and Fri         Past History     Past Medical History:  Past Medical History:   Diagnosis Date    Allergic rhinitis     asthma allergies    Arthritis     Chronic [facility]CKD[facility] stage [facility] (moderate) [facility]     GERD (gastroesophageal reflux disease)     Hyperlipidemia     Hypertension     Ill-defined condition     hard of hearing    Sun-damaged skin     Sunburn, blistering     teen    Ulcerative colitis ([facility] [address] 10/28/2014       Past Surgical History:  Past Surgical History:   Procedure Laterality Date    HX CATARACT REMOVAL Bilateral     HX COLONOSCOPY  9/14    ulcerative colitis    HX ORTHOPAEDIC      bilateral knee replacement       Family History:  Family History   Problem Relation Age of Onset    Diabetes Daughter     Hypertension Mother     Heart Disease Father        Social History:  Social History     Tobacco Use    Smoking status: Never Smoker    Smokeless tobacco: Never Used   Substance Use Topics    Alcohol use: No    Drug use: No       Allergies: Allergies   Allergen Reactions    Cefdinir Diarrhea    Penicillin G Rash    Statins-Hmg-Coa Reductase Inhibitors Other (comments)     arthralgia         Review of Systems   Review of Systems   Constitutional: Negative for activity change, chills and fever. HENT: Negative for facial swelling and voice change. Eyes: Negative for redness. Respiratory: Negative for cough, shortness of breath and wheezing. Cardiovascular: Negative for chest pain and leg swelling. Gastrointestinal: Positive for abdominal pain, constipation and nausea. Negative for diarrhea and vomiting. Genitourinary: Negative for decreased urine volume. Musculoskeletal: Negative for gait problem. Skin: Negative for pallor and rash. Neurological: Negative for tremors and facial asymmetry. Psychiatric/Behavioral: Negative for agitation. All other systems reviewed and are negative. Physical Exam   Physical Exam  Vitals signs and nursing note reviewed. Exam conducted with a chaperone present. Constitutional:       Comments: 69-year-old female, resting in bed, no distress   HENT:      Head: Normocephalic and atraumatic. Neck:      Musculoskeletal: Normal range of motion. Cardiovascular:      Rate and Rhythm: Normal rate and regular rhythm. Heart sounds: No murmur. No friction rub. No gallop. Pulmonary:      Effort: Pulmonary effort is normal.      Breath sounds: Normal breath sounds. Abdominal:      Palpations: Abdomen is soft. Tenderness: There is no abdominal tenderness. Genitourinary:     Rectum: Normal.      Comments: Soft stool noted, no obstruction  Musculoskeletal: Normal range of motion. Skin:     General: Skin is warm. Capillary Refill: Capillary refill takes less than 2 seconds. Neurological:      General: No focal deficit present. Mental Status: She is alert.    Psychiatric:         Mood and Affect: Mood normal.         Diagnostic Study Results     Labs -     Recent Results (from the past 12 hour(s))   OCCULT BLOOD, STOOL    Collection Time: 09/02/20 12:53 PM   Result Value Ref Range    Occult blood, stool Negative NEG     CBC WITH AUTOMATED DIFF    Collection Time: 09/02/20  1:05 PM   Result Value Ref Range    WBC 12.7 (H) 3.6 - 11.0 K/uL    RBC 4.25 3.80 - 5.20 M/uL    HGB 13.6 11.5 - 16.0 g/dL    HCT 41.2 35.0 - 47.0 %    MCV 96.9 80.0 - 99.0 FL    MCH 32.0 26.0 - 34.0 PG    MCHC 33.0 30.0 - 36.5 g/dL    RDW 15.2 (H) 11.5 - 14.5 %    PLATELET 412 331 - 058 K/uL    MPV 12.3 8.9 - 12.9 FL    NRBC 0.0 0  WBC    ABSOLUTE NRBC 0.00 0.00 - 0.01 K/uL    NEUTROPHILS 39 32 - 75 %    LYMPHOCYTES 49 12 - 49 % MONOCYTES 8 5 - 13 %    EOSINOPHILS 3 0 - 7 %    BASOPHILS 1 0 - 1 %    IMMATURE GRANULOCYTES 0 0.0 - 0.5 %    ABS. NEUTROPHILS 5.0 1.8 - 8.0 K/UL    ABS. LYMPHOCYTES 6.2 (H) 0.8 - 3.5 K/UL    ABS. MONOCYTES 1.0 0.0 - 1.0 K/UL    ABS. EOSINOPHILS 0.4 0.0 - 0.4 K/UL    ABS. BASOPHILS 0.1 0.0 - 0.1 K/UL    ABS. IMM. GRANS. 0.0 0.00 - 0.04 K/UL    DF SMEAR SCANNED      RBC COMMENTS ANISOCYTOSIS  1+        WBC COMMENTS REACTIVE LYMPHS     METABOLIC PANEL, COMPREHENSIVE    Collection Time: 09/02/20  1:05 PM   Result Value Ref Range    Sodium 137 136 - 145 mmol/L    Potassium 4.6 3.5 - 5.1 mmol/L    Chloride 105 97 - 108 mmol/L    CO2 26 21 - 32 mmol/L    Anion gap 6 5 - 15 mmol/L    Glucose 113 (H) 65 - 100 mg/dL    BUN 34 (H) 6 - 20 MG/DL    Creatinine 1.72 (H) 0.55 - 1.02 MG/DL    BUN/Creatinine ratio 20 12 - 20      GFR est AA 34 (L) >60 ml/min/1.73m2    GFR est non-AA 28 (L) >60 ml/min/1.73m2    Calcium 10.0 8.5 - 10.1 MG/DL    Bilirubin, total 0.9 0.2 - 1.0 MG/DL    ALT (SGPT) 32 12 - 78 U/L    AST (SGOT) 25 15 - 37 U/L    Alk.  phosphatase 88 45 - 117 U/L    Protein, total 8.1 6.4 - 8.2 g/dL    Albumin 3.5 3.5 - 5.0 g/dL    Globulin 4.6 (H) 2.0 - 4.0 g/dL    A-G Ratio 0.8 (L) 1.1 - 2.2     LIPASE    Collection Time: 09/02/20  1:05 PM   Result Value Ref Range    Lipase 38 (L) 73 - 393 U/L   URINALYSIS W/ REFLEX CULTURE    Collection Time: 09/02/20  1:05 PM    Specimen: Urine   Result Value Ref Range    Color YELLOW/STRAW      Appearance TURBID (A) CLEAR      Specific gravity 1.013 1.003 - 1.030      pH (UA) 5.5 5.0 - 8.0      Protein Negative NEG mg/dL    Glucose Negative NEG mg/dL    Ketone Negative NEG mg/dL    Bilirubin Negative NEG      Blood TRACE (A) NEG      Urobilinogen 0.2 0.2 - 1.0 EU/dL    Nitrites Positive (A) NEG      Leukocyte Esterase LARGE (A) NEG      WBC >100 (H) 0 - 4 /hpf    RBC 5-10 0 - 5 /hpf    Epithelial cells FEW FEW /lpf    Bacteria 4+ (A) NEG /hpf    UA:UC IF INDICATED URINE CULTURE ORDERED (A) CNI      Hyaline cast 2-5 0 - 5 /lpf       Radiologic Studies -   CT ABD PELV W CONT   Final Result   IMPRESSION:      1. Large amount of retained fecal material in the right colon which is mildly   distended. 2. Diverticulosis without diverticulitis. 3. No acute inflammatory or other acute abnormality of the abdomen or pelvis. CT Results  (Last 48 hours)               09/02/20 1447  CT ABD PELV W CONT Final result    Impression:  IMPRESSION:       1. Large amount of retained fecal material in the right colon which is mildly   distended. 2. Diverticulosis without diverticulitis. 3. No acute inflammatory or other acute abnormality of the abdomen or pelvis. Narrative:  EXAM: CT ABD PELV W CONT       INDICATION: Right lower quadrant abd pain since Friday, r/o obstruction ,   constipation, back pain for 3 days       COMPARISON: 7/20/2014        CONTRAST: 100 mL of Isovue-370. TECHNIQUE:    Following the uneventful intravenous administration of contrast, thin axial   images were obtained through the abdomen and pelvis. Coronal and sagittal   reconstructions were generated. Oral contrast was not administered. CT dose   reduction was achieved through use of a standardized protocol tailored for this   examination and automatic exposure control for dose modulation. FINDINGS:    LOWER THORAX: Pericardial calcification is stable. LIVER: No mass. BILIARY TREE: Gallbladder is within normal limits. CBD is not dilated. SPLEEN: within normal limits. PANCREAS: No mass or ductal dilatation. ADRENALS: Unremarkable. KIDNEYS: No mass, calculus, or hydronephrosis. STOMACH: Unremarkable. SMALL BOWEL: No dilatation or wall thickening. COLON: Severe diverticula, most numerous in the sigmoid colon without associated   acute inflammatory change. Mild right colonic distention with retained fecal   material and contrast, but no definite wall thickening or inflammatory change. APPENDIX: Unremarkable   PERITONEUM: No ascites or pneumoperitoneum. RETROPERITONEUM: No lymphadenopathy or aortic aneurysm. REPRODUCTIVE ORGANS: Unremarkable for age. URINARY BLADDER: No mass or calculus. BONES: No destructive bone lesion. Multilevel degenerative change in the lumbar   spine. ABDOMINAL WALL: No mass or hernia. ADDITIONAL COMMENTS: N/A               CXR Results  (Last 48 hours)    None          Medical Decision Making   I am the first provider for this patient. I reviewed the vital signs, available nursing notes, past medical history, past surgical history, family history and social history. Vital Signs-Reviewed the patient's vital signs. Patient Vitals for the past 12 hrs:   Temp Pulse Resp BP SpO2   09/02/20 1204     98 %   09/02/20 1051 97.5 °F (36.4 °C) 89 16 130/70 97 %       Records Reviewed: Nursing Notes    Provider Notes (Medical Decision Making):     81-year-old female presents emergency department with right flank pain and lower abdominal pain. Patient is not hypoxic on room air as interpreted by me. Differential is broad, includes constipation, there is no fecal impaction on rectal exam, small bowel obstruction, diverticulitis, pyelonephritis. Labs reviewed, mild leukocytosis, patient appears nontoxic with normal vitals. Urinalysis does support evidence of infection. Rectal exam chaperoned by nursing was Hemoccult negative. Patient underwent CT with oral contrast, this showed a large amount of stool, advised oral bowel regimen, including magnesium citrate to go. Patient was given 1 dose of ceftriaxone and discharged with Bactrim given her history of cephalosporin allergy. Return precautions discussed, patient agreeable to plan. ED Course:   Initial assessment performed. The patients presenting problems have been discussed, and they are in agreement with the care plan formulated and outlined with them.   I have encouraged them to ask questions as they arise throughout their visit. ED Course as of Sep 02 1610   Wed Sep 02, 2020   1431 Labs reviewed, mild leukocytosis, normal differential.  Mild elevation of renal function, will give 500 cc of fluid. Urine does show strong evidence of infection, will give a dose of Rocephin as patient has not had anaphylaxis to PCN in the past.    [MB]      ED Course User Index  [MB] MD Lawrence Hilton MD      Disposition:    Reassessments as above. Labs and ED course reviewed. Patient was discharged home and was provided strict return precautions. Patient to follow-up with PCP or specialist per discharge instructions or return to ED for worsening symptoms. DISCHARGE PLAN:  1. Current Discharge Medication List      START taking these medications    Details   trimethoprim-sulfamethoxazole (Bactrim DS) 160-800 mg per tablet Take 1 Tab by mouth two (2) times a day for 10 days. Qty: 20 Tab, Refills: 0           2. Follow-up Information     Follow up With Specialties Details Why Contact Info    Babita Hager MD Family Medicine In 2 days  [address] [place] [address]  [phone]          3. Return to ED if worse     Diagnosis     Clinical Impression:   1. Pyelonephritis    2. Constipation, unspecified constipation type        Attestations:    Lawrence Calero MD    Please note that this dictation was completed with [employer], the computer voice recognition software. Quite often unanticipated grammatical, syntax, homophones, and other interpretive errors are inadvertently transcribed by the computer software. Please disregard these errors. Please excuse any errors that have escaped final proofreading. Thank you.

## 2020-09-02 NOTE — DISCHARGE INSTRUCTIONS
You were seen in the ER for your symptoms. Please read the attached discharge instructions. Please follow-up with your primary care doctor or other specialists as instructed. Return to the ER for new or worsening symptoms at any time. Your CAT scan shows a large amount of stool, please continue to take your miralax. You can also take a half bottle of magnesium citrate at home to help void. Please stop taking your cipro and switch to Bactrim.

## 2020-09-04 ENCOUNTER — HOSPITAL ENCOUNTER (EMERGENCY)
Age: 85
Discharge: HOME | End: 2020-09-04
Attending: EMERGENCY MEDICINE
Payer: MEDICARE

## 2020-09-04 ENCOUNTER — APPOINTMENT (OUTPATIENT)
Dept: GENERAL RADIOLOGY | Age: 85
End: 2020-09-04
Attending: STUDENT IN AN ORGANIZED HEALTH CARE EDUCATION/TRAINING PROGRAM
Payer: MEDICARE

## 2020-09-04 VITALS
HEART RATE: 68 BPM | RESPIRATION RATE: 16 BRPM | TEMPERATURE: 97.3 F | SYSTOLIC BLOOD PRESSURE: 138 MMHG | HEIGHT: 62 IN | BODY MASS INDEX: 29.7 KG/M2 | OXYGEN SATURATION: 99 % | WEIGHT: 161.38 LBS | DIASTOLIC BLOOD PRESSURE: 90 MMHG

## 2020-09-04 DIAGNOSIS — K59.00 CONSTIPATION, UNSPECIFIED CONSTIPATION TYPE: Primary | ICD-10-CM

## 2020-09-04 LAB
APPEARANCE UR: CLEAR
BACTERIA SPEC CULT: ABNORMAL
BACTERIA URNS QL MICRO: ABNORMAL /HPF
BILIRUB UR QL CFM: NEGATIVE
CC UR VC: ABNORMAL
COLOR UR: ABNORMAL
EPITH CASTS URNS QL MICRO: ABNORMAL /LPF
GLUCOSE UR STRIP.AUTO-MCNC: NEGATIVE MG/DL
HGB UR QL STRIP: NEGATIVE
KETONES UR QL STRIP.AUTO: NEGATIVE MG/DL
LEUKOCYTE ESTERASE UR QL STRIP.AUTO: ABNORMAL
NITRITE UR QL STRIP.AUTO: NEGATIVE
OTHER,OTHU: ABNORMAL
PH UR STRIP: 5 [PH] (ref 5–8)
PROT UR STRIP-MCNC: NEGATIVE MG/DL
RBC #/AREA URNS HPF: ABNORMAL /HPF (ref 0–5)
SERVICE CMNT-IMP: ABNORMAL
SP GR UR REFRACTOMETRY: 1.03 (ref 1–1.03)
UA: UC IF INDICATED,UAUC: ABNORMAL
UROBILINOGEN UR QL STRIP.AUTO: 0.2 EU/DL (ref 0.2–1)
WBC URNS QL MICRO: ABNORMAL /HPF (ref 0–4)

## 2020-09-04 PROCEDURE — 74018 RADEX ABDOMEN 1 VIEW: CPT

## 2020-09-04 PROCEDURE — 81001 URINALYSIS AUTO W/SCOPE: CPT

## 2020-09-04 PROCEDURE — 87086 URINE CULTURE/COLONY COUNT: CPT

## 2020-09-04 PROCEDURE — 74011250637 HC RX REV CODE- 250/637: Performed by: STUDENT IN AN ORGANIZED HEALTH CARE EDUCATION/TRAINING PROGRAM

## 2020-09-04 PROCEDURE — 99285 EMERGENCY DEPT VISIT HI MDM: CPT

## 2020-09-04 PROCEDURE — 74011250637 HC RX REV CODE- 250/637: Performed by: EMERGENCY MEDICINE

## 2020-09-04 RX ORDER — AMOXICILLIN 250 MG
1 CAPSULE ORAL DAILY
Qty: 30 TAB | Refills: 0 | Status: SHIPPED | OUTPATIENT
Start: 2020-09-04 | End: 2021-02-15

## 2020-09-04 RX ORDER — ACETAMINOPHEN 500 MG
1000 TABLET ORAL ONCE
Status: COMPLETED | OUTPATIENT
Start: 2020-09-04 | End: 2020-09-04

## 2020-09-04 RX ORDER — MAGNESIUM CITRATE
296 SOLUTION, ORAL ORAL
Status: COMPLETED | OUTPATIENT
Start: 2020-09-04 | End: 2020-09-04

## 2020-09-04 RX ADMIN — MAGNESIUM CITRATE 296 ML: 1.75 LIQUID ORAL at 10:37

## 2020-09-04 RX ADMIN — ACETAMINOPHEN 1000 MG: 500 TABLET ORAL at 12:19

## 2020-09-04 NOTE — ED NOTES
Discharge instructions reviewed with pt by provider. pt verbalized understanding of discharge instructions. Copy of discharge paperwork given. Patient condition stable, respiratory status within normal limits, neuro status intact.  Ambulatory out of er, accompanied by daughter

## 2020-09-04 NOTE — ED PROVIDER NOTES
EMERGENCY DEPARTMENT HISTORY AND PHYSICAL EXAM          Date: 9/4/2020  Patient Name: Dina Ibrahim  Attending of Record: Max Loera MD    History of Presenting Illness     Chief Complaint   Patient presents with    Constipation     Patient has not had a BM since last Friday    Back Pain     lower back pain       History Provided By: Patient    HPI: Dina Ibrahim is a 81 y.o. female, pmhx of hyperlipidemia, GERD, hypertension, CKD, ulcerative colitis, who presents by private vehicle to the ED c/o constipation. She notes she was seen here 2 days ago and diagnosed with a UTI and possible pyelonephritis. She has not had a bowel movement since Friday. Normally has 3 bowel movements per day given her ulcerative colitis. She endorses worsening abdominal pain diffusely which radiates to her back. She is taken magnesium citrate and glycerin suppository and MiraLAX with no relief. Denies nausea, vomiting. Denies fever. PCP: Rebekah Vásquez MD    There are no other complaints, changes, or physical findings at this time. Current Facility-Administered Medications   Medication Dose Route Frequency Provider Last Rate Last Dose    magnesium citrate solution 296 mL  296 mL Oral NOW Sj Plascencia MD         Current Outpatient Medications   Medication Sig Dispense Refill    trimethoprim-sulfamethoxazole (Bactrim DS) 160-800 mg per tablet Take 1 Tab by mouth two (2) times a day for 10 days.  20 Tab 0    allopurinoL (ZYLOPRIM) 100 mg tablet TAKE 1 TABLET DAILY 90 Tab 3    levothyroxine (SYNTHROID) 50 mcg tablet TAKE 1 TABLET DAILY BEFORE BREAKFAST 90 Tab 3    felodipine (PLENDIL SR) 5 mg 24 hr tablet TAKE 1 TABLET DAILY 90 Tab 4    azelastine (ASTELIN) 137 mcg (0.1 %) nasal spray USE 1 SPRAY IN EACH NOSTRIL TWICE A DAY 3 Bottle 3    carvedilol (COREG) 3.125 mg tablet TAKE 1 TABLET TWICE A DAY WITH MEALS 180 Tab 4    fluorouracil (EFUDEX) 5 % chemo cream Apply a thin layer twice daily for 2 weeks the stop for 2 weeks and then apply for 2 weeks again. 40 g 0    famotidine (PEPCID PO) Take  by mouth as needed.  ACETAMINOPHEN PO Take  by mouth.  IBUPROFEN PO Take  by mouth as needed.  cholecalciferol (VITAMIN D3) 1,000 unit tablet Take 2 Tabs by mouth daily. 60 Tab 0    omega-3 fatty acids-vitamin e (FISH OIL) 1,000 mg cap Take 1 Cap by mouth.  rosuvastatin (CRESTOR) 5 mg tablet Take 5 mg by mouth nightly. Taking twice a week Mon and Fri         Past History     Past Medical History:  Past Medical History:   Diagnosis Date    Allergic rhinitis     asthma allergies    Arthritis     Chronic kidney disease [facility]CKD[facility] stage III (moderate) [facility])     GERD (gastroesophageal reflux disease)     Hyperlipidemia     Hypertension     Ill-defined condition     hard of hearing    Sun-damaged skin     Sunburn, blistering     teen    Ulcerative colitis ([facility] [address] 10/28/2014       Past Surgical History:  Past Surgical History:   Procedure Laterality Date    HX CATARACT REMOVAL Bilateral     HX COLONOSCOPY  9/14    ulcerative colitis    HX ORTHOPAEDIC      bilateral knee replacement       Family History:  Family History   Problem Relation Age of Onset    Diabetes Daughter     Hypertension Mother     Heart Disease Father        Social History:  Social History     Tobacco Use    Smoking status: Never Smoker    Smokeless tobacco: Never Used   Substance Use Topics    Alcohol use: No    Drug use: No       Allergies: Allergies   Allergen Reactions    Cefdinir Diarrhea    Penicillin G Rash    Statins-Hmg-Coa Reductase Inhibitors Other (comments)     arthralgia         Review of Systems   Review of Systems   Constitutional: Negative for fatigue, fever and unexpected weight change. HENT: Negative for congestion. Eyes: Negative for visual disturbance. Respiratory: Negative for chest tightness and shortness of breath. Cardiovascular: Negative for chest pain and leg swelling.    Gastrointestinal: Positive for abdominal distention, abdominal pain and constipation. Negative for blood in stool, diarrhea, nausea and vomiting. Endocrine: Negative for polyuria. Genitourinary: Negative for difficulty urinating and dysuria. Musculoskeletal: Positive for back pain. Negative for arthralgias. Allergic/Immunologic: Negative for immunocompromised state. Neurological: Negative for dizziness, light-headedness and headaches. Hematological: Negative. Psychiatric/Behavioral: Negative for agitation and behavioral problems. Physical Exam   Physical Exam  Constitutional:       Appearance: Normal appearance. HENT:      Head: Normocephalic. Nose: Nose normal.      Mouth/Throat:      Mouth: Mucous membranes are dry. Eyes:      Extraocular Movements: Extraocular movements intact. Pupils: Pupils are equal, round, and reactive to light. Neck:      Musculoskeletal: Normal range of motion. Cardiovascular:      Rate and Rhythm: Normal rate and regular rhythm. Pulmonary:      Effort: Pulmonary effort is normal.   Abdominal:      General: Abdomen is flat. Bowel sounds are normal. There is no distension. Palpations: Abdomen is soft. There is no mass. Tenderness: There is abdominal tenderness. There is no right CVA tenderness, left CVA tenderness or guarding. Musculoskeletal: Normal range of motion. Comments: No significant point tenderness on on spine exam   Skin:     General: Skin is warm and dry. Neurological:      General: No focal deficit present. Mental Status: She is alert and oriented to person, place, and time.    Psychiatric:         Mood and Affect: Mood normal.         Behavior: Behavior normal.         Diagnostic Study Results     Labs -     Recent Results (from the past 12 hour(s))   URINALYSIS W/ REFLEX CULTURE    Collection Time: 09/04/20  9:51 AM    Specimen: Miscellaneous sample; Urine    Urine specimen   Result Value Ref Range    Color YELLOW/STRAW Appearance CLEAR CLEAR      Specific gravity 1.026 1.003 - 1.030      pH (UA) 5.0 5.0 - 8.0      Protein Negative NEG mg/dL    Glucose Negative NEG mg/dL    Ketone Negative NEG mg/dL    Blood Negative NEG      Urobilinogen 0.2 0.2 - 1.0 EU/dL    Nitrites Negative NEG      Leukocyte Esterase MODERATE (A) NEG      WBC PENDING /hpf    RBC PENDING /hpf    Epithelial cells PENDING /lpf    Bacteria PENDING /hpf    UA:UC IF INDICATED PENDING    BILIRUBIN, CONFIRM    Collection Time: 09/04/20  9:51 AM   Result Value Ref Range    Bilirubin UA, confirm Negative NEG         Radiologic Studies -   XR ABD (KUB)   Final Result   IMPRESSION: No acute process. CT Results  (Last 48 hours)               09/02/20 1447  CT ABD PELV W CONT Final result    Impression:  IMPRESSION:       1. Large amount of retained fecal material in the right colon which is mildly   distended. 2. Diverticulosis without diverticulitis. 3. No acute inflammatory or other acute abnormality of the abdomen or pelvis. Narrative:  EXAM: CT ABD PELV W CONT       INDICATION: Right lower quadrant abd pain since Friday, r/o obstruction ,   constipation, back pain for 3 days       COMPARISON: 7/20/2014        CONTRAST: 100 mL of Isovue-370. TECHNIQUE:    Following the uneventful intravenous administration of contrast, thin axial   images were obtained through the abdomen and pelvis. Coronal and sagittal   reconstructions were generated. Oral contrast was not administered. CT dose   reduction was achieved through use of a standardized protocol tailored for this   examination and automatic exposure control for dose modulation. FINDINGS:    LOWER THORAX: Pericardial calcification is stable. LIVER: No mass. BILIARY TREE: Gallbladder is within normal limits. CBD is not dilated. SPLEEN: within normal limits. PANCREAS: No mass or ductal dilatation. ADRENALS: Unremarkable. KIDNEYS: No mass, calculus, or hydronephrosis.    STOMACH: Unremarkable. SMALL BOWEL: No dilatation or wall thickening. COLON: Severe diverticula, most numerous in the sigmoid colon without associated   acute inflammatory change. Mild right colonic distention with retained fecal   material and contrast, but no definite wall thickening or inflammatory change. APPENDIX: Unremarkable   PERITONEUM: No ascites or pneumoperitoneum. RETROPERITONEUM: No lymphadenopathy or aortic aneurysm. REPRODUCTIVE ORGANS: Unremarkable for age. URINARY BLADDER: No mass or calculus. BONES: No destructive bone lesion. Multilevel degenerative change in the lumbar   spine. ABDOMINAL WALL: No mass or hernia. ADDITIONAL COMMENTS: N/A               CXR Results  (Last 48 hours)    None            Medical Decision Making   I am the first provider for this patient. I reviewed the vital signs, available nursing notes, past medical history, past surgical history, family history and social history. Vital Signs-Reviewed the patient's vital signs. Patient Vitals for the past 12 hrs:   Temp Pulse Resp BP SpO2   09/04/20 0930    120/63 96 %   09/04/20 0925    132/63 95 %   09/04/20 0913 97.3 °F (36.3 °C) 70 16 119/51 99 %       Records Reviewed: Nursing Notes and Old Medical Records    Provider Notes (Medical Decision Making):   DDx: Constipation, SBO, ileus    45-year-old female with history of UC and current UTI being treated presents with constipation for 7 days. Notes she normally has 3 bowel movements per day. Currently complaining of abdominal pain rating to her back. Vital signs are stable. Exam shows diffuse mild tenderness to abdomen with no peritoneal signs and no point tenderness on the back. KUB obtained and shows no signs of ileus or bowel obstruction. Recent CT 2 days ago showed large stool burden. Will give soapsuds enema and magnesium citrate for return of bowel function. Continue to reassess. ED Course and Progress Notes:   Initial assessment performed. The patients presenting problems have been discussed, and they are in agreement with the care plan formulated and outlined with them. I have encouraged them to ask questions as they arise throughout their visit. Patient tolerated bowel regimen and had good bowel movement, feels mostly better. Will give bowel regimen for home. Patient agreeable and amenable to discharge. Diagnosis     Clinical Impression:   1. Constipation, unspecified constipation type        Disposition:  Home    DISCHARGE PLAN:    1. Current Discharge Medication List      START taking these medications    Details   senna-docusate (Senna Laxative-Stool Softener) 8.6-50 mg per tablet Take 1 Tab by mouth daily. Qty: 30 Tab, Refills: 0           2. Follow-up Information     Follow up With Specialties Details Why Contact Info    Haylee Fitzgerald MD Family Medicine In 1 week  [address]  [address] [facility] [id number] [phone]      [facility] [address] [facility] DEPT Emergency Medicine  If symptoms worsen [address]  [address] [facility]  [phone]        3.  Return to ED if worse         Yoli Griffiths MD, PGY-2  Emergency Medicine

## 2020-09-04 NOTE — ED NOTES
No success with second round of soap suds enema. This RN is able to advance enema tubing with ease, only small amounts of mucous noted to the tip of tubing. Pt is able to pass gas. Pt requesting tylenol for back pain. Spoke with MD regarding enema and pain.

## 2020-09-04 NOTE — DISCHARGE INSTRUCTIONS
Patient Education        Constipation: Care Instructions  Your Care Instructions     Constipation means that you have a hard time passing stools (bowel movements). People pass stools from 3 times a day to once every 3 days. What is normal for you may be different. Constipation may occur with pain in the rectum and cramping. The pain may get worse when you try to pass stools. Sometimes there are small amounts of bright red blood on toilet paper or the surface of stools. This is because of enlarged veins near the rectum (hemorrhoids). A few changes in your diet and lifestyle may help you avoid ongoing constipation. Your doctor may also prescribe medicine to help loosen your stool. Some medicines can cause constipation. These include pain medicines and antidepressants. Tell your doctor about all the medicines you take. Your doctor may want to make a medicine change to ease your symptoms. Follow-up care is a key part of your treatment and safety. Be sure to make and go to all appointments, and call your doctor if you are having problems. It's also a good idea to know your test results and keep a list of the medicines you take. How can you care for yourself at home? · Drink plenty of fluids, enough so that your urine is light yellow or clear like water. If you have kidney, heart, or liver disease and have to limit fluids, talk with your doctor before you increase the amount of fluids you drink. · Include high-fiber foods in your diet each day. These include fruits, vegetables, beans, and whole grains. · Get at least 30 minutes of exercise on most days of the week. Walking is a good choice. You also may want to do other activities, such as running, swimming, cycling, or playing tennis or team sports. · Take a fiber supplement, such as Citrucel or Metamucil, every day. Read and follow all instructions on the label. · Schedule time each day for a bowel movement. A daily routine may help.  Take your time having your bowel movement. · Support your feet with a small step stool when you sit on the toilet. This helps flex your hips and places your pelvis in a squatting position. · Your doctor may recommend an over-the-counter laxative to relieve your constipation. Examples are Milk of Magnesia and MiraLax. Read and follow all instructions on the label. Do not use laxatives on a long-term basis. When should you call for help? Call your doctor now or seek immediate medical care if:    · You have new or worse belly pain.     · You have new or worse nausea or vomiting.     · You have blood in your stools. Watch closely for changes in your health, and be sure to contact your doctor if:    · Your constipation is getting worse.     · You do not get better as expected. Where can you learn more? Go to http://teresa-abdi.info/  Enter P343 in the search box to learn more about \"Constipation: Care Instructions. \"  Current as of: June 26, 2019               Content Version: 12.6  © 1811-6947 [employer]. Care instructions adapted under license by [employer] (which disclaims liability or warranty for this information). If you have questions about a medical condition or this instruction, always ask your healthcare professional. [address] any warranty or liability for your use of this information.

## 2020-09-05 LAB
BACTERIA SPEC CULT: NORMAL
SERVICE CMNT-IMP: NORMAL

## 2020-09-20 ENCOUNTER — HOSPITAL ENCOUNTER (EMERGENCY)
Age: 85
Discharge: HOME | End: 2020-09-20
Attending: EMERGENCY MEDICINE
Payer: MEDICARE

## 2020-09-20 ENCOUNTER — APPOINTMENT (OUTPATIENT)
Dept: CT IMAGING | Age: 85
End: 2020-09-20
Attending: EMERGENCY MEDICINE
Payer: MEDICARE

## 2020-09-20 VITALS
TEMPERATURE: 98 F | HEIGHT: 62 IN | DIASTOLIC BLOOD PRESSURE: 67 MMHG | RESPIRATION RATE: 18 BRPM | HEART RATE: 66 BPM | SYSTOLIC BLOOD PRESSURE: 147 MMHG | BODY MASS INDEX: 29.05 KG/M2 | OXYGEN SATURATION: 96 % | WEIGHT: 157.85 LBS

## 2020-09-20 DIAGNOSIS — S22.080A COMPRESSION FRACTURE OF T11 VERTEBRA, INITIAL ENCOUNTER (HCC): Primary | ICD-10-CM

## 2020-09-20 LAB
ALBUMIN SERPL-MCNC: 3.4 G/DL (ref 3.5–5)
ALBUMIN/GLOB SERPL: 0.8 {RATIO} (ref 1.1–2.2)
ALP SERPL-CCNC: 107 U/L (ref 45–117)
ALT SERPL-CCNC: 72 U/L (ref 12–78)
ANION GAP SERPL CALC-SCNC: 6 MMOL/L (ref 5–15)
APPEARANCE UR: CLEAR
AST SERPL-CCNC: 51 U/L (ref 15–37)
BACTERIA URNS QL MICRO: NEGATIVE /HPF
BASOPHILS # BLD: 0 K/UL (ref 0–0.1)
BASOPHILS NFR BLD: 0 % (ref 0–1)
BILIRUB SERPL-MCNC: 0.7 MG/DL (ref 0.2–1)
BILIRUB UR QL: NEGATIVE
BUN SERPL-MCNC: 50 MG/DL (ref 6–20)
BUN/CREAT SERPL: 27 (ref 12–20)
CALCIUM SERPL-MCNC: 9.3 MG/DL (ref 8.5–10.1)
CHLORIDE SERPL-SCNC: 113 MMOL/L (ref 97–108)
CO2 SERPL-SCNC: 23 MMOL/L (ref 21–32)
COLOR UR: NORMAL
CREAT SERPL-MCNC: 1.83 MG/DL (ref 0.55–1.02)
DIFFERENTIAL METHOD BLD: ABNORMAL
EOSINOPHIL # BLD: 0.1 K/UL (ref 0–0.4)
EOSINOPHIL NFR BLD: 1 % (ref 0–7)
EPITH CASTS URNS QL MICRO: NORMAL /LPF
ERYTHROCYTE [DISTWIDTH] IN BLOOD BY AUTOMATED COUNT: 16.1 % (ref 11.5–14.5)
GLOBULIN SER CALC-MCNC: 4.4 G/DL (ref 2–4)
GLUCOSE SERPL-MCNC: 128 MG/DL (ref 65–100)
GLUCOSE UR STRIP.AUTO-MCNC: NEGATIVE MG/DL
HCT VFR BLD AUTO: 38.8 % (ref 35–47)
HGB BLD-MCNC: 12.4 G/DL (ref 11.5–16)
HGB UR QL STRIP: NEGATIVE
HYALINE CASTS URNS QL MICRO: NORMAL /LPF (ref 0–5)
IMM GRANULOCYTES # BLD AUTO: 0.1 K/UL (ref 0–0.04)
IMM GRANULOCYTES NFR BLD AUTO: 1 % (ref 0–0.5)
KETONES UR QL STRIP.AUTO: NEGATIVE MG/DL
LEUKOCYTE ESTERASE UR QL STRIP.AUTO: NEGATIVE
LYMPHOCYTES # BLD: 4.4 K/UL (ref 0.8–3.5)
LYMPHOCYTES NFR BLD: 29 % (ref 12–49)
MCH RBC QN AUTO: 31.2 PG (ref 26–34)
MCHC RBC AUTO-ENTMCNC: 32 G/DL (ref 30–36.5)
MCV RBC AUTO: 97.7 FL (ref 80–99)
MONOCYTES # BLD: 0.8 K/UL (ref 0–1)
MONOCYTES NFR BLD: 5 % (ref 5–13)
NEUTS SEG # BLD: 9.7 K/UL (ref 1.8–8)
NEUTS SEG NFR BLD: 64 % (ref 32–75)
NITRITE UR QL STRIP.AUTO: NEGATIVE
NRBC # BLD: 0 K/UL (ref 0–0.01)
NRBC BLD-RTO: 0 PER 100 WBC
PH UR STRIP: 5.5 [PH] (ref 5–8)
PLATELET # BLD AUTO: 319 K/UL (ref 150–400)
PMV BLD AUTO: 11.3 FL (ref 8.9–12.9)
POTASSIUM SERPL-SCNC: 4.9 MMOL/L (ref 3.5–5.1)
PROT SERPL-MCNC: 7.8 G/DL (ref 6.4–8.2)
PROT UR STRIP-MCNC: NEGATIVE MG/DL
RBC # BLD AUTO: 3.97 M/UL (ref 3.8–5.2)
RBC #/AREA URNS HPF: NORMAL /HPF (ref 0–5)
SODIUM SERPL-SCNC: 142 MMOL/L (ref 136–145)
SP GR UR REFRACTOMETRY: 1.02 (ref 1–1.03)
UA: UC IF INDICATED,UAUC: NORMAL
UROBILINOGEN UR QL STRIP.AUTO: 0.2 EU/DL (ref 0.2–1)
WBC # BLD AUTO: 15.1 K/UL (ref 3.6–11)
WBC URNS QL MICRO: NORMAL /HPF (ref 0–4)

## 2020-09-20 PROCEDURE — 74011250637 HC RX REV CODE- 250/637: Performed by: EMERGENCY MEDICINE

## 2020-09-20 PROCEDURE — 85025 COMPLETE CBC W/AUTO DIFF WBC: CPT

## 2020-09-20 PROCEDURE — 81001 URINALYSIS AUTO W/SCOPE: CPT

## 2020-09-20 PROCEDURE — 99285 EMERGENCY DEPT VISIT HI MDM: CPT

## 2020-09-20 PROCEDURE — 36415 COLL VENOUS BLD VENIPUNCTURE: CPT

## 2020-09-20 PROCEDURE — 74176 CT ABD & PELVIS W/O CONTRAST: CPT

## 2020-09-20 PROCEDURE — 74011000250 HC RX REV CODE- 250: Performed by: EMERGENCY MEDICINE

## 2020-09-20 PROCEDURE — 80053 COMPREHEN METABOLIC PANEL: CPT

## 2020-09-20 RX ORDER — LIDOCAINE 4 G/100G
1 PATCH TOPICAL
Status: DISCONTINUED | OUTPATIENT
Start: 2020-09-20 | End: 2020-09-20 | Stop reason: HOSPADM

## 2020-09-20 RX ORDER — MICONAZOLE NITRATE 20 MG/G
CREAM TOPICAL
Status: COMPLETED | OUTPATIENT
Start: 2020-09-20 | End: 2020-09-20

## 2020-09-20 RX ADMIN — MICONAZOLE NITRATE: 20 CREAM TOPICAL at 12:44

## 2020-09-20 NOTE — ED NOTES
Ekta HALLMAN reviewed discharge instructions with the patient. The patient verbalized understanding. All questions and concerns were addressed. The patient is discharged via wheelchair in the care of family members with instructions and prescriptions in hand. Pt is alert and oriented x 4. Respirations are clear and unlabored.

## 2020-09-20 NOTE — ED PROVIDER NOTES
Date: 9/20/2020  Patient Name: Grazyna Schulz    History of Presenting Illness     Chief Complaint   Patient presents with    Flank Pain     flank pain on right side for two weeks       History Provided By: Patient, daughter    HPI: Grazyna Schulz, 81 y.o. female presents to the ED with cc of right lower back pain for the past month. Patient states that this is her third ER visit for the same pain. She states that during her first visit she was diagnosed with a urinary tract infection as well as significant constipation. She was placed on ciprofloxacin for her urinary tract infection as well as prednisone to take as needed for pain. When her pain did not improve she came back into the ER where they found that her urine was resistant to the ciprofloxacin and they changed her to the Bactrim, which she has now completed. However she states that the pain continues to be significant. The pain is localized mostly in the right low back with occasional radiation to the right lower quadrant. She denies any prior history of any back problems or surgeries. She states that she has a history of chronic kidney disease and is incontinent at baseline. She otherwise denies any changes in her urinary or bowel habits. She denies any associated fevers, nausea or vomiting. There are no other complaints, changes, or physical findings at this time. PCP: Haylee Fitzgerald MD    No current facility-administered medications on file prior to encounter. Current Outpatient Medications on File Prior to Encounter   Medication Sig Dispense Refill    senna-docusate (Senna Laxative-Stool Softener) 8.6-50 mg per tablet Take 1 Tab by mouth daily.  30 Tab 0    allopurinoL (ZYLOPRIM) 100 mg tablet TAKE 1 TABLET DAILY 90 Tab 3    levothyroxine (SYNTHROID) 50 mcg tablet TAKE 1 TABLET DAILY BEFORE BREAKFAST 90 Tab 3    felodipine (PLENDIL SR) 5 mg 24 hr tablet TAKE 1 TABLET DAILY 90 Tab 4    azelastine (ASTELIN) 137 mcg (0.1 %) nasal spray USE 1 SPRAY IN EACH NOSTRIL TWICE A DAY 3 Bottle 3    carvedilol (COREG) 3.125 mg tablet TAKE 1 TABLET TWICE A DAY WITH MEALS 180 Tab 4    fluorouracil (EFUDEX) 5 % chemo cream Apply a thin layer twice daily for 2 weeks the stop for 2 weeks and then apply for 2 weeks again. 40 g 0    famotidine (PEPCID PO) Take  by mouth as needed.  ACETAMINOPHEN PO Take  by mouth.  IBUPROFEN PO Take  by mouth as needed.  cholecalciferol (VITAMIN D3) 1,000 unit tablet Take 2 Tabs by mouth daily. 60 Tab 0    omega-3 fatty acids-vitamin e (FISH OIL) 1,000 mg cap Take 1 Cap by mouth.  rosuvastatin (CRESTOR) 5 mg tablet Take 5 mg by mouth nightly. Taking twice a week Mon and Fri         Past History     Past Medical History:  Past Medical History:   Diagnosis Date    Allergic rhinitis     asthma allergies    Arthritis     Chronic kidney disease (CKD), stage III (moderate) ([facility]     GERD (gastroesophageal reflux disease)     Hyperlipidemia     Hypertension     Ill-defined condition     hard of hearing    Sun-damaged skin     Sunburn, blistering     teen    Ulcerative colitis ([facility] [address] 10/28/2014       Past Surgical History:  Past Surgical History:   Procedure Laterality Date    HX CATARACT REMOVAL Bilateral     HX COLONOSCOPY  9/14    ulcerative colitis    HX ORTHOPAEDIC      bilateral knee replacement       Family History:  Family History   Problem Relation Age of Onset    Diabetes Daughter     Hypertension Mother     Heart Disease Father        Social History:  Social History     Tobacco Use    Smoking status: Never Smoker    Smokeless tobacco: Never Used   Substance Use Topics    Alcohol use: No    Drug use: No       Allergies: Allergies   Allergen Reactions    Cefdinir Diarrhea    Penicillin G Rash    Statins-Hmg-Coa Reductase Inhibitors Other (comments)     arthralgia         Review of Systems   Review of Systems   Constitutional: Negative for fatigue and fever.    HENT: Negative. Eyes: Negative. Respiratory: Negative for shortness of breath and wheezing. Cardiovascular: Negative for chest pain and leg swelling. Gastrointestinal: Negative for blood in stool, constipation, diarrhea, nausea and vomiting. Endocrine: Negative. Genitourinary: Positive for flank pain. Negative for difficulty urinating, dysuria, hematuria and urgency. Musculoskeletal: Positive for back pain. Skin: Negative for rash. Allergic/Immunologic: Negative. Neurological: Negative for weakness and numbness. Hematological: Negative. Psychiatric/Behavioral: Negative. Physical Exam   Physical Exam  Constitutional:       Appearance: She is well-developed. HENT:      Head: Normocephalic and atraumatic. Eyes:      Pupils: Pupils are equal, round, and reactive to light. Neck:      Musculoskeletal: Normal range of motion. Vascular: No JVD. Trachea: No tracheal deviation. Cardiovascular:      Rate and Rhythm: Normal rate and regular rhythm. Heart sounds: Normal heart sounds. No murmur. No friction rub. No gallop. Pulmonary:      Effort: Pulmonary effort is normal.      Breath sounds: Normal breath sounds. No stridor. No wheezing or rales. Abdominal:      General: Bowel sounds are normal. There is no distension. Palpations: Abdomen is soft. There is no mass. Tenderness: There is no abdominal tenderness. There is no guarding. Musculoskeletal: Normal range of motion. General: No tenderness. Comments: Tenderness to palpation over the right low back. Normal range of motion of her back. No midline T or L-spine tenderness, no step-offs. Skin:     General: Skin is warm and dry. Findings: No rash. Neurological:      Mental Status: She is alert and oriented to person, place, and time. Psychiatric:         Behavior: Behavior normal.         Thought Content:  Thought content normal.         Judgment: Judgment normal.         Diagnostic Study Results     Labs -     Recent Results (from the past 12 hour(s))   METABOLIC PANEL, COMPREHENSIVE    Collection Time: 09/20/20 10:52 AM   Result Value Ref Range    Sodium 142 136 - 145 mmol/L    Potassium 4.9 3.5 - 5.1 mmol/L    Chloride 113 (H) 97 - 108 mmol/L    CO2 23 21 - 32 mmol/L    Anion gap 6 5 - 15 mmol/L    Glucose 128 (H) 65 - 100 mg/dL    BUN 50 (H) 6 - 20 MG/DL    Creatinine 1.83 (H) 0.55 - 1.02 MG/DL    BUN/Creatinine ratio 27 (H) 12 - 20      GFR est AA 32 (L) >60 ml/min/1.73m2    GFR est non-AA 26 (L) >60 ml/min/1.73m2    Calcium 9.3 8.5 - 10.1 MG/DL    Bilirubin, total 0.7 0.2 - 1.0 MG/DL    ALT (SGPT) 72 12 - 78 U/L    AST (SGOT) 51 (H) 15 - 37 U/L    Alk. phosphatase 107 45 - 117 U/L    Protein, total 7.8 6.4 - 8.2 g/dL    Albumin 3.4 (L) 3.5 - 5.0 g/dL    Globulin 4.4 (H) 2.0 - 4.0 g/dL    A-G Ratio 0.8 (L) 1.1 - 2.2     CBC WITH AUTOMATED DIFF    Collection Time: 09/20/20 10:52 AM   Result Value Ref Range    WBC 15.1 (H) 3.6 - 11.0 K/uL    RBC 3.97 3.80 - 5.20 M/uL    HGB 12.4 11.5 - 16.0 g/dL    HCT 38.8 35.0 - 47.0 %    MCV 97.7 80.0 - 99.0 FL    MCH 31.2 26.0 - 34.0 PG    MCHC 32.0 30.0 - 36.5 g/dL    RDW 16.1 (H) 11.5 - 14.5 %    PLATELET 020 503 - 478 K/uL    MPV 11.3 8.9 - 12.9 FL    NRBC 0.0 0  WBC    ABSOLUTE NRBC 0.00 0.00 - 0.01 K/uL    NEUTROPHILS 64 32 - 75 %    LYMPHOCYTES 29 12 - 49 %    MONOCYTES 5 5 - 13 %    EOSINOPHILS 1 0 - 7 %    BASOPHILS 0 0 - 1 %    IMMATURE GRANULOCYTES 1 (H) 0.0 - 0.5 %    ABS. NEUTROPHILS 9.7 (H) 1.8 - 8.0 K/UL    ABS. LYMPHOCYTES 4.4 (H) 0.8 - 3.5 K/UL    ABS. MONOCYTES 0.8 0.0 - 1.0 K/UL    ABS. EOSINOPHILS 0.1 0.0 - 0.4 K/UL    ABS. BASOPHILS 0.0 0.0 - 0.1 K/UL    ABS. IMM.  GRANS. 0.1 (H) 0.00 - 0.04 K/UL    DF AUTOMATED     URINALYSIS W/ REFLEX CULTURE    Collection Time: 09/20/20 11:48 AM    Specimen: Urine   Result Value Ref Range    Color YELLOW/STRAW      Appearance CLEAR CLEAR      Specific gravity 1.024 1.003 - 1.030      pH (UA) 5.5 5.0 - 8.0      Protein Negative NEG mg/dL    Glucose Negative NEG mg/dL    Ketone Negative NEG mg/dL    Bilirubin Negative NEG      Blood Negative NEG      Urobilinogen 0.2 0.2 - 1.0 EU/dL    Nitrites Negative NEG      Leukocyte Esterase Negative NEG      WBC 0-4 0 - 4 /hpf    RBC 0-5 0 - 5 /hpf    Epithelial cells FEW FEW /lpf    Bacteria Negative NEG /hpf    UA:UC IF INDICATED CULTURE NOT INDICATED BY UA RESULT CNI      Hyaline cast 2-5 0 - 5 /lpf       Radiologic Studies -   No orders to display     CT Results  (Last 48 hours)               09/20/20 1311  CT ABD PELV WO CONT Final result    Impression:  IMPRESSION:       1. Acute T11 compression fracture. 2. Nodular liver suggesting cirrhosis. 3. Cholelithiasis. Narrative:  EXAM: CT ABD PELV WO CONT       INDICATION: R low back pain       COMPARISON: September 2020       CONTRAST:  None. TECHNIQUE:    Thin axial images were obtained through the abdomen and pelvis. Coronal and   sagittal reformats were generated. Oral contrast was not administered. CT dose   reduction was achieved through use of a standardized protocol tailored for this   examination and automatic exposure control for dose modulation. The absence of intravenous contrast material reduces the sensitivity for   evaluation of the vasculature and solid organs. FINDINGS:    LOWER THORAX: Calcified pericardium. LIVER: Nodular liver. BILIARY TREE: Cholelithiasis. CBD is not dilated. SPLEEN: within normal limits. PANCREAS: No focal abnormality. ADRENALS: Unremarkable. KIDNEYS/URETERS: No calculus or hydronephrosis. STOMACH: Unremarkable. SMALL BOWEL: No dilatation or wall thickening. COLON: Diverticulosis. APPENDIX: Normal   PERITONEUM: No ascites or pneumoperitoneum. RETROPERITONEUM: Vascular calcifications. REPRODUCTIVE ORGANS: Normal.   URINARY BLADDER: No mass or calculus. BONES: Multilevel degenerative changes. T11 compression fracture. ABDOMINAL WALL: No mass or hernia. ADDITIONAL COMMENTS: N/A               CXR Results  (Last 48 hours)    None          Medical Decision Making   I am the first provider for this patient. I reviewed the vital signs, available nursing notes, past medical history, past surgical history, family history and social history. Vital Signs-Reviewed the patient's vital signs. Patient Vitals for the past 12 hrs:   Temp Pulse Resp BP SpO2   09/20/20 1030 97.5 °F (36.4 °C) 93 16 135/62 98 %         Records Reviewed: Nursing Notes and Old Medical Records    Provider Notes (Medical Decision Making):   Patient is an 81-year-old female presenting with right low back pain. Patient has already had a CT scan and blood work done which showed constipation and diverticulosis but no diverticulitis. Patient has successfully completed a course of antibiotics for her urinary tract infection but continues to have pain. She is having more regular bowel movements now but we are still smaller in volume than she is used to. Will recheck labs and urine to evaluate for recurrent urinary tract infection, and get repeat CT abdomen and pelvis. ED Course:   Initial assessment performed. The patients presenting problems have been discussed, and they are in agreement with the care plan formulated and outlined with them. I have encouraged them to ask questions as they arise throughout their visit. ED Course as of Sep 20 1404   Sun Sep 20, 2020   1404 Updated the patient and family on her CT and lab results. She is feeling better with the lidocaine patch. Will discharge home with ortho and neurosurgery follow up. [CK]      ED Course User Index  [CK] [address] [facility] [address], DO      Disposition:  Home    DISCHARGE PLAN:  1. Current Discharge Medication List        2. Follow-up Information    None       3.   Return to ED if worse     Diagnosis     Clinical Impression: No diagnosis found. Attestations:    Diogo Yung, DO    Please note that this dictation was completed with [employer], the computer voice recognition software. Quite often unanticipated grammatical, syntax, homophones, and other interpretive errors are inadvertently transcribed by the computer software. Please disregard these errors. Please excuse any errors that have escaped final proofreading. Thank you.

## 2020-09-20 NOTE — ED NOTES
Pt presents to ED with daughter with ride sided back pain x 1 month. Was seen in ER a month ago for constipation and UTI, was on Bactrim and Cipro. Endorses irregular bowel movements. Denies chest pain, dyspnea, dysuria, urinary retention, fevers, n/v.. Pt is currently lying quietly on the stretcher in no apparent distress. Call bell within reach. Side rails x 2. Cardiac monitor x 2. Stretcher locked in the lowest position.

## 2020-09-20 NOTE — DISCHARGE INSTRUCTIONS
You were seen in the ER today for persistent back pain and your CT scan showed a compression fracture at T 11. You can  lidocaine patches at the drug store [facility]) as well as diclofenac cream for localized pain control. I have given you the names of the the orthopedic spine doctor as well as the neurosurgery clinic for follow up.

## 2020-09-26 ENCOUNTER — HOSPITAL ENCOUNTER (OUTPATIENT)
Dept: MRI IMAGING | Age: 85
Discharge: HOME | End: 2020-09-26
Attending: PHYSICAL MEDICINE & REHABILITATION
Payer: MEDICARE

## 2020-09-26 DIAGNOSIS — M51.36 DDD (DEGENERATIVE DISC DISEASE), LUMBAR: ICD-10-CM

## 2020-09-26 DIAGNOSIS — S22.080A CLOSED WEDGE COMPRESSION FRACTURE OF ELEVENTH THORACIC VERTEBRA, INITIAL ENCOUNTER: ICD-10-CM

## 2020-09-26 PROCEDURE — 72148 MRI LUMBAR SPINE W/O DYE: CPT

## 2020-09-30 ENCOUNTER — TELEPHONE (OUTPATIENT)
Dept: FAMILY MEDICINE CLINIC | Age: 85
End: 2020-09-30

## 2020-09-30 NOTE — TELEPHONE ENCOUNTER
Patient calling to see if Nhan Gutierrez has signed paper to go to Ortho. She is in pain and is trying to get in as soon as possible with them.  She can be reached at [phone]

## 2020-09-30 NOTE — TELEPHONE ENCOUNTER
I have not seen any paperwork from Ortho. She had an appointment with Ortho yesterday. What exactly does she need us to do?

## 2020-10-01 ENCOUNTER — TELEPHONE (OUTPATIENT)
Dept: FAMILY MEDICINE CLINIC | Age: 85
End: 2020-10-01

## 2020-10-06 NOTE — TELEPHONE ENCOUNTER
Received a Ortho form. It is blank. Called patient to see what we can do to help. She has compression fractures and the plan is to do a kyphoplasty. She ended up seeing a doctor in [address] [facility] last week to get the preop form filled out. That was sent in last week. She is waiting to hear back from orthopedics. Plan would be to do the kyphoplasty under procedural sedation. She does not take any blood thinners. She can continue her medicines for the procedure.     If she needs anything from us just let us know

## 2020-10-19 ENCOUNTER — TRANSCRIBE ORDER (OUTPATIENT)
Dept: SCHEDULING | Age: 85
End: 2020-10-19

## 2020-10-19 DIAGNOSIS — Z98.890 PERSONAL HISTORY OF SURGERY TO HEART AND GREAT VESSELS, PRESENTING HAZARDS TO HEALTH: Primary | ICD-10-CM

## 2020-10-19 DIAGNOSIS — M54.50 ACUTE LOW BACK PAIN: ICD-10-CM

## 2020-10-19 DIAGNOSIS — S22.070A WEDGE COMPRESSION FRACTURE OF T10 VERTEBRA (HCC): ICD-10-CM

## 2020-10-20 ENCOUNTER — HOSPITAL ENCOUNTER (OUTPATIENT)
Dept: MRI IMAGING | Age: 85
Discharge: HOME | End: 2020-10-20
Attending: PHYSICAL MEDICINE & REHABILITATION
Payer: MEDICARE

## 2020-10-20 ENCOUNTER — TRANSCRIBE ORDER (OUTPATIENT)
Dept: SCHEDULING | Age: 85
End: 2020-10-20

## 2020-10-20 DIAGNOSIS — S22.070A: ICD-10-CM

## 2020-10-20 DIAGNOSIS — S22.070A WEDGE COMPRESSION FRACTURE OF T10 VERTEBRA (HCC): ICD-10-CM

## 2020-10-20 DIAGNOSIS — M51.36 DDD (DEGENERATIVE DISC DISEASE), LUMBAR: ICD-10-CM

## 2020-10-20 DIAGNOSIS — Z98.890 S/P KYPHOPLASTY: ICD-10-CM

## 2020-10-20 DIAGNOSIS — M54.6 THORACIC SPINE PAIN: Primary | ICD-10-CM

## 2020-10-20 DIAGNOSIS — Z98.890 PERSONAL HISTORY OF SURGERY TO HEART AND GREAT VESSELS, PRESENTING HAZARDS TO HEALTH: ICD-10-CM

## 2020-10-20 DIAGNOSIS — M54.50 ACUTE MIDLINE LOW BACK PAIN, UNSPECIFIED WHETHER SCIATICA PRESENT: ICD-10-CM

## 2020-10-20 DIAGNOSIS — M54.50 ACUTE LOW BACK PAIN: ICD-10-CM

## 2020-10-20 PROCEDURE — 72146 MRI CHEST SPINE W/O DYE: CPT

## 2020-10-27 ENCOUNTER — HOSPITAL ENCOUNTER (OUTPATIENT)
Dept: BONE DENSITY | Age: 85
Discharge: HOME | End: 2020-10-27
Attending: NURSE PRACTITIONER
Payer: MEDICARE

## 2020-10-27 DIAGNOSIS — M54.6 THORACIC SPINE PAIN: ICD-10-CM

## 2020-10-27 DIAGNOSIS — M51.36 DDD (DEGENERATIVE DISC DISEASE), LUMBAR: ICD-10-CM

## 2020-10-27 DIAGNOSIS — S22.070A: ICD-10-CM

## 2020-10-27 DIAGNOSIS — M54.50 ACUTE MIDLINE LOW BACK PAIN, UNSPECIFIED WHETHER SCIATICA PRESENT: ICD-10-CM

## 2020-10-27 DIAGNOSIS — Z98.890 S/P KYPHOPLASTY: ICD-10-CM

## 2020-10-27 PROCEDURE — 77080 DXA BONE DENSITY AXIAL: CPT

## 2020-12-29 ENCOUNTER — TELEPHONE (OUTPATIENT)
Dept: FAMILY MEDICINE CLINIC | Age: 85
End: 2020-12-29

## 2020-12-29 NOTE — TELEPHONE ENCOUNTER
----- Message from Sophia Almonte sent at 12/29/2020  8:11 AM EST -----  Regarding: DR Vo/telephone  Contact: [phone]  Appointment not available    Caller's first and last name and relationship to patient (if not the patient):      Best contact number:[phone]      Preferred date and time:      Scheduled appointment date and time:      Reason for appointment:f/up on Pneumonia. Pt was dx at [facility] 10 days ago.       Details to clarify the request:      Sophia Almonte

## 2021-01-04 ENCOUNTER — NURSE TRIAGE (OUTPATIENT)
Dept: OTHER | Facility: CLINIC | Age: 86
End: 2021-01-04

## 2021-01-04 NOTE — TELEPHONE ENCOUNTER
Brief description of triage: cough/shortness of breath    Triage indicates for patient to Contact PCP now or go to [facility]    Care advice provided, patient verbalizes understanding; denies any other questions or concerns; instructed to call back for any new or worsening symptoms. Writer provided warm transfer to [place] [facility] for appointment scheduling. Attention Provider: Thank you for allowing me to participate in the care of your patient. The patient was connected to triage in response to information provided to the [facility]. Please do not respond through this encounter as the response is not directed to a shared pool. Reason for Disposition   MILD difficulty breathing (e.g., minimal/no SOB at rest, SOB with walking, pulse <100)    Answer Assessment - Initial Assessment Questions  1. COVID-19 DIAGNOSIS: \"Who made your Coronavirus (COVID-19) diagnosis? \" \"Was it confirmed by a positive lab test?\" If not diagnosed by a HCP, ask \"Are there lots of cases (community spread) where you live? \" (See public health department website, if unsure)      Tested negative on 12/17/20 - test was negative, completed 2 courses of antibiotics for pneumonia    2. COVID-19 EXPOSURE: \"Was there any known exposure to COVID before the symptoms began? \" CDC Definition of close contact: within 6 feet (2 meters) for a total of 15 minutes or more over a 24-hour period. None    3. ONSET: \"When did the COVID-19 symptoms start?\"       12/17/20    4. WORST SYMPTOM: \"What is your worst symptom? \" (e.g., cough, fever, shortness of breath, muscle aches)      Cough, shortness of breath    5. COUGH: \"Do you have a cough? \" If so, ask: \"How bad is the cough? \"        Mild cough that comes and goes    6. FEVER: \"Do you have a fever? \" If so, ask: \"What is your temperature, how was it measured, and when did it start? \"      No fever    7. RESPIRATORY STATUS: \"Describe your breathing? \" (e.g., shortness of breath, wheezing, unable to speak) Mild shortness of breath with activity    8. BETTER-SAME-WORSE: Radha Sanchez you getting better, staying the same or getting worse compared to yesterday? \"  If getting worse, ask, \"In what way? \"      Same    9. HIGH RISK DISEASE: \"Do you have any chronic medical problems? \" (e.g., asthma, heart or lung disease, weak immune system, obesity, etc.)      None    10. PREGNANCY: \"Is there any chance you are pregnant? \" \"When was your last menstrual period? \"        NA    11. OTHER SYMPTOMS: \"Do you have any other symptoms? \"  (e.g., chills, fatigue, headache, loss of smell or taste, muscle pain, sore throat; new loss of smell or taste especially support the diagnosis of COVID-19)        Fatigue - lack of energy    Protocols used: CORONAVIRUS (COVID-19) DIAGNOSED OR SUSPECTED-ADULT-

## 2021-01-05 ENCOUNTER — VIRTUAL VISIT (OUTPATIENT)
Dept: FAMILY MEDICINE CLINIC | Age: 86
End: 2021-01-05
Payer: MEDICARE

## 2021-01-05 ENCOUNTER — TELEPHONE (OUTPATIENT)
Dept: FAMILY MEDICINE CLINIC | Age: 86
End: 2021-01-05

## 2021-01-05 DIAGNOSIS — R06.02 SOB (SHORTNESS OF BREATH): ICD-10-CM

## 2021-01-05 DIAGNOSIS — J18.9 PNEUMONIA DUE TO INFECTIOUS ORGANISM, UNSPECIFIED LATERALITY, UNSPECIFIED PART OF LUNG: Primary | ICD-10-CM

## 2021-01-05 DIAGNOSIS — R53.83 FATIGUE, UNSPECIFIED TYPE: ICD-10-CM

## 2021-01-05 PROCEDURE — 99443 PR PHYS/QHP TELEPHONE EVALUATION 21-30 MIN: CPT | Performed by: FAMILY MEDICINE

## 2021-01-05 RX ORDER — CALCITONIN SALMON 200 [IU]/.09ML
1 SPRAY, METERED NASAL DAILY
COMMUNITY
Start: 2020-09-22 | End: 2021-05-27

## 2021-01-05 NOTE — TELEPHONE ENCOUNTER
Do not see a hospital stay. See mention of a negative Covid test mid December and completed 2 rounds of antibiotics.     Probably okay for office visit

## 2021-01-05 NOTE — PROGRESS NOTES
Carter Moncada is a 81 y.o. female evaluated via audio only technology on 1/5/2021. Consent: She and/or her health care decision maker is aware that she may receive a bill for this audio only encounter, depending on her insurance coverage, and has provided verbal consent to proceed: Yes    I communicated with the patient and/or health care decision maker about the nature and details of the following:  Assessment & Plan:     Pneumonia  [facility] with [facility], finished course 1 week ago  Persistent shortness of breath, HEAD, tachycardia. Otherwise improved symptoms  Differential includes expected pneumonia recovery, anemia, PE  Suggest we check on blood counts, kidney function and electrolytes and monitor symptoms. If labs are normal would expect her to feel little better every day but may take weeks to recover fully    Labs to be sent to [facility] to make an office appointment for assessment  12  Subjective:   Carter Moncada is a 81 y.o. female who was seen for Shortness of Breath      She is recovering from a pneumonia. She felt bad in mid December. Describes her symptoms as tired, weak. Assumed that she had a UTI so [facility]. Clinician noticed she was breathing heavy and x-rayed, found evidence of pneumonia and placed her on doxycycline. Evidently patient also had urine culture checked and a few days later had ciprofloxacin called in as well for UTI. Patient took both antibiotics and finished them him 1 week ago. She never had a fever. She has a cough but attributed this to allergies. She does feel better now than she did when she went to [facility]. However she still feels tired. She is now able to do some chores that would have been impossible when she was sick like laundry and cooking however she is needing to take frequent breaks while doing these chores which is unusual for her. She feels short of breath when walking a distance.   She feels that her heart is racing with exertion. She is concerned about her slow recovery    She denies chest pain      Prior to Admission medications    Medication Sig Start Date End Date Taking? Authorizing Provider   calcitonin, salmon, (MIACALCIN) nasal 1 [place] by Nasal route daily. 9/22/20   Provider, Historical   senna-docusate (Senna Laxative-Stool Softener) 8.6-50 mg per tablet Take 1 Tab by mouth daily. 9/4/20   Chitra Herrmann MD   allopurinoL (ZYLOPRIM) 100 mg tablet TAKE 1 TABLET DAILY 7/8/20   Leah Sanchez MD   levothyroxine (SYNTHROID) 50 mcg tablet TAKE 1 TABLET DAILY BEFORE BREAKFAST 7/8/20   Leah Sanchez MD   felodipine (PLENDIL SR) 5 mg 24 hr tablet TAKE 1 TABLET DAILY 3/2/20   Leah Sanchez MD   azelastine (ASTELIN) 137 mcg (0.1 %) nasal spray USE 1 SPRAY IN EACH NOSTRIL TWICE A DAY 1/27/20   Leah Sanchez MD   carvedilol (COREG) 3.125 mg tablet TAKE 1 TABLET TWICE A DAY WITH MEALS 1/13/20   Leola Guerra MD   famotidine (PEPCID PO) Take  by mouth as needed. Provider, Historical   ACETAMINOPHEN PO Take  by mouth. Provider, Historical   IBUPROFEN PO Take  by mouth as needed. Provider, Historical   cholecalciferol (VITAMIN D3) 1,000 unit tablet Take 2 Tabs by mouth daily. 9/7/18   Oliverio Candelario MD   omega-3 fatty acids-vitamin e (FISH OIL) 1,000 mg cap Take 1 Cap by mouth. Provider, Historical   rosuvastatin (CRESTOR) 5 mg tablet Take 5 mg by mouth nightly. Taking twice a week Mon and Fri    Provider, Historical     Allergies   Allergen Reactions    Cefdinir Diarrhea    Penicillin G Rash    Statins-Hmg-Coa Reductase Inhibitors Other (comments)     arthralgia         I affirm this is a Patient-Initiated Episode with a Patient who has not had a related appointment within my department in the past 7 days or scheduled within the next 24 hours.     Total Time: minutes: 21-30 minutes    Note: not billable if this call serves to triage the patient into an appointment for the relevant concern      Colten Haq MD

## 2021-01-05 NOTE — TELEPHONE ENCOUNTER
Pt is calling wanting an in office apt [facility] she had covid test done there ended up in [place] with pneumonia she wants to f/u for this can she come in the office or is this virtual

## 2021-01-07 ENCOUNTER — HOSPITAL ENCOUNTER (OUTPATIENT)
Dept: LAB | Age: 86
Discharge: HOME | End: 2021-01-07
Payer: MEDICARE

## 2021-01-07 PROCEDURE — 80053 COMPREHEN METABOLIC PANEL: CPT

## 2021-01-07 PROCEDURE — 85025 COMPLETE CBC W/AUTO DIFF WBC: CPT

## 2021-01-07 PROCEDURE — 36415 COLL VENOUS BLD VENIPUNCTURE: CPT

## 2021-01-08 LAB
ALBUMIN SERPL-MCNC: 4 G/DL (ref 3.6–4.6)
ALBUMIN/GLOB SERPL: 1.5 {RATIO} (ref 1.2–2.2)
ALP SERPL-CCNC: 80 IU/L (ref 39–117)
ALT SERPL-CCNC: 67 IU/L (ref 0–32)
AST SERPL-CCNC: 56 IU/L (ref 0–40)
BASOPHILS # BLD AUTO: 0.1 X10E3/UL (ref 0–0.2)
BASOPHILS NFR BLD AUTO: 1 %
BILIRUB SERPL-MCNC: 0.5 MG/DL (ref 0–1.2)
BUN SERPL-MCNC: 20 MG/DL (ref 8–27)
BUN/CREAT SERPL: 14 (ref 12–28)
CALCIUM SERPL-MCNC: 9.7 MG/DL (ref 8.7–10.3)
CHLORIDE SERPL-SCNC: 108 MMOL/L (ref 96–106)
CO2 SERPL-SCNC: 21 MMOL/L (ref 20–29)
CREAT SERPL-MCNC: 1.42 MG/DL (ref 0.57–1)
EOSINOPHIL # BLD AUTO: 0.1 X10E3/UL (ref 0–0.4)
EOSINOPHIL NFR BLD AUTO: 1 %
ERYTHROCYTE [DISTWIDTH] IN BLOOD BY AUTOMATED COUNT: 14.5 % (ref 11.7–15.4)
GLOBULIN SER CALC-MCNC: 2.6 G/DL (ref 1.5–4.5)
GLUCOSE SERPL-MCNC: 205 MG/DL (ref 65–99)
HCT VFR BLD AUTO: 37.5 % (ref 34–46.6)
HGB BLD-MCNC: 12.3 G/DL (ref 11.1–15.9)
IMM GRANULOCYTES # BLD AUTO: 0 X10E3/UL (ref 0–0.1)
IMM GRANULOCYTES NFR BLD AUTO: 0 %
INTERPRETATION: NORMAL
LYMPHOCYTES # BLD AUTO: 5.3 X10E3/UL (ref 0.7–3.1)
LYMPHOCYTES NFR BLD AUTO: 53 %
MCH RBC QN AUTO: 32.8 PG (ref 26.6–33)
MCHC RBC AUTO-ENTMCNC: 32.8 G/DL (ref 31.5–35.7)
MCV RBC AUTO: 100 FL (ref 79–97)
MONOCYTES # BLD AUTO: 0.7 X10E3/UL (ref 0.1–0.9)
MONOCYTES NFR BLD AUTO: 7 %
NEUTROPHILS # BLD AUTO: 3.7 X10E3/UL (ref 1.4–7)
NEUTROPHILS NFR BLD AUTO: 38 %
PLATELET # BLD AUTO: 207 X10E3/UL (ref 150–450)
POTASSIUM SERPL-SCNC: 4.7 MMOL/L (ref 3.5–5.2)
PROT SERPL-MCNC: 6.6 G/DL (ref 6–8.5)
RBC # BLD AUTO: 3.75 X10E6/UL (ref 3.77–5.28)
SODIUM SERPL-SCNC: 142 MMOL/L (ref 134–144)
WBC # BLD AUTO: 9.9 X10E3/UL (ref 3.4–10.8)

## 2021-01-12 ENCOUNTER — TELEPHONE (OUTPATIENT)
Dept: FAMILY MEDICINE CLINIC | Age: 86
End: 2021-01-12

## 2021-01-22 ENCOUNTER — TELEPHONE (OUTPATIENT)
Dept: FAMILY MEDICINE CLINIC | Age: 86
End: 2021-01-22

## 2021-01-22 NOTE — TELEPHONE ENCOUNTER
From envera    Caller's first and last name: Casandra/ Daughter   Reason for call: Orthopedic Dr. Dwaine Porras. [place] Chahal" faxed over a clearance form for a procedure on her back.  Wants to know the status of the fax   Callback required yes/no and why: yes, confirm   Best contact number(s): [phone]   Details to clarify the request: Ramonita

## 2021-01-26 ENCOUNTER — TELEPHONE (OUTPATIENT)
Dept: FAMILY MEDICINE CLINIC | Age: 86
End: 2021-01-26

## 2021-01-26 NOTE — TELEPHONE ENCOUNTER
I filled out the clearance form yesterday. Should be out to be faxed. No contraindication, cleared for procedural sedation    She has recovered from the pneumonia. Would rather not do a chest x-ray if possible. Chest x-rays can take 6 weeks to look better and are never used to confirm resolved pneumonia. If absolutely required prior to her procedure I can order that. Urinalysis is reasonable.   Patient can stop by our office to leave sample

## 2021-01-26 NOTE — TELEPHONE ENCOUNTER
Tez Rosenthal from [address] called stating that Houston Zaragoza needs to be cleared for a T-10 Typo plasty. The office has faxed over a clearance form for her and would like for you to order a chest x-ray to make for sure she's clear of pneumonia. They also would like for her to be re-checked for her UTI to make for sure that's gone as well before the procedure.

## 2021-01-28 ENCOUNTER — CLINICAL SUPPORT (OUTPATIENT)
Dept: FAMILY MEDICINE CLINIC | Age: 86
End: 2021-01-28
Payer: MEDICARE

## 2021-01-28 DIAGNOSIS — Z01.818 PRE-OP EVALUATION: Primary | ICD-10-CM

## 2021-01-28 PROCEDURE — 81003 URINALYSIS AUTO W/O SCOPE: CPT | Performed by: FAMILY MEDICINE

## 2021-01-28 NOTE — TELEPHONE ENCOUNTER
Spoke with Julita Pelayo and informed her of Dr. Dulce Fortune suggestion r/t CXR. Informed Julita Pelayo that we will do a UA on the pt.  verified by pt. Pt will come to the office this afternoon for leave urine specimen.

## 2021-02-15 ENCOUNTER — OFFICE VISIT (OUTPATIENT)
Dept: INTERNAL MEDICINE CLINIC | Age: 86
End: 2021-02-15
Payer: MEDICARE

## 2021-02-15 VITALS
OXYGEN SATURATION: 98 % | HEIGHT: 64 IN | DIASTOLIC BLOOD PRESSURE: 64 MMHG | TEMPERATURE: 97.8 F | SYSTOLIC BLOOD PRESSURE: 110 MMHG | BODY MASS INDEX: 25.44 KG/M2 | WEIGHT: 149 LBS | RESPIRATION RATE: 16 BRPM | HEART RATE: 89 BPM

## 2021-02-15 DIAGNOSIS — E78.2 MIXED HYPERLIPIDEMIA: ICD-10-CM

## 2021-02-15 DIAGNOSIS — R31.9 URINARY TRACT INFECTION WITH HEMATURIA, SITE UNSPECIFIED: ICD-10-CM

## 2021-02-15 DIAGNOSIS — N18.31 STAGE 3A CHRONIC KIDNEY DISEASE (HCC): ICD-10-CM

## 2021-02-15 DIAGNOSIS — G89.29 CHRONIC BILATERAL LOW BACK PAIN WITHOUT SCIATICA: ICD-10-CM

## 2021-02-15 DIAGNOSIS — M43.16 SPONDYLOLISTHESIS OF LUMBAR REGION: ICD-10-CM

## 2021-02-15 DIAGNOSIS — Z98.890 S/P KYPHOPLASTY: ICD-10-CM

## 2021-02-15 DIAGNOSIS — E03.2 HYPOTHYROIDISM DUE TO NON-MEDICATION EXOGENOUS SUBSTANCES: ICD-10-CM

## 2021-02-15 DIAGNOSIS — R35.0 URINARY FREQUENCY: ICD-10-CM

## 2021-02-15 DIAGNOSIS — M54.50 CHRONIC BILATERAL LOW BACK PAIN WITHOUT SCIATICA: ICD-10-CM

## 2021-02-15 DIAGNOSIS — R73.02 GLUCOSE INTOLERANCE (IMPAIRED GLUCOSE TOLERANCE): ICD-10-CM

## 2021-02-15 DIAGNOSIS — N39.0 URINARY TRACT INFECTION WITH HEMATURIA, SITE UNSPECIFIED: ICD-10-CM

## 2021-02-15 DIAGNOSIS — R79.89 ELEVATED LFTS: ICD-10-CM

## 2021-02-15 DIAGNOSIS — I10 ESSENTIAL HYPERTENSION: Primary | ICD-10-CM

## 2021-02-15 DIAGNOSIS — K51.90 ULCERATIVE COLITIS WITHOUT COMPLICATIONS, UNSPECIFIED LOCATION (HCC): ICD-10-CM

## 2021-02-15 LAB
BACTERIA,BACTU: ABNORMAL
BILIRUB UR QL: NEGATIVE
CLARITY: ABNORMAL
COLOR UR: ABNORMAL
ERYTHROCYTE [DISTWIDTH] IN BLOOD BY AUTOMATED COUNT: 14.5 %
GLUCOSE 24H UR-MRATE: NEGATIVE G/(24.H)
HCT VFR BLD AUTO: 41.9 % (ref 37–51)
HGB BLD-MCNC: 13 G/DL (ref 12–18)
HGB UR QL STRIP: ABNORMAL
KETONES UR QL STRIP.AUTO: NEGATIVE
LEUKOCYTE ESTERASE: ABNORMAL
MCH RBC QN AUTO: 31.7 PG (ref 26–32)
MCHC RBC AUTO-ENTMCNC: 31 G/DL (ref 30–36)
MCV RBC AUTO: 102.2 FL (ref 80–97)
NITRITE UR QL STRIP.AUTO: ABNORMAL
PH UR STRIP: 7 [PH] (ref 5–7)
PLATELET # BLD AUTO: 220 K/UL (ref 140–440)
PROT UR STRIP-MCNC: ABNORMAL MG/DL
RBC # BLD AUTO: 4.1 M/UL (ref 4.2–6.3)
RBC #/AREA URNS HPF: ABNORMAL #/HPF
SP GR UR REFRACTOMETRY: 1.01 (ref 1–1.03)
TSH SERPL DL<=0.05 MIU/L-ACNC: 2.14 UIU/ML (ref 0.34–5.6)
UROBILINOGEN UR QL STRIP.AUTO: NEGATIVE
WBC # BLD AUTO: 13.4 K/UL (ref 4.1–10.9)
WBC URNS QL MICRO: ABNORMAL #/HPF

## 2021-02-15 PROCEDURE — 85027 COMPLETE CBC AUTOMATED: CPT | Performed by: INTERNAL MEDICINE

## 2021-02-15 PROCEDURE — G8752 SYS BP LESS 140: HCPCS | Performed by: INTERNAL MEDICINE

## 2021-02-15 PROCEDURE — 84443 ASSAY THYROID STIM HORMONE: CPT | Performed by: INTERNAL MEDICINE

## 2021-02-15 PROCEDURE — 1101F PT FALLS ASSESS-DOCD LE1/YR: CPT | Performed by: INTERNAL MEDICINE

## 2021-02-15 PROCEDURE — G8399 PT W/DXA RESULTS DOCUMENT: HCPCS | Performed by: INTERNAL MEDICINE

## 2021-02-15 PROCEDURE — 99204 OFFICE O/P NEW MOD 45 MIN: CPT | Performed by: INTERNAL MEDICINE

## 2021-02-15 PROCEDURE — G8427 DOCREV CUR MEDS BY ELIG CLIN: HCPCS | Performed by: INTERNAL MEDICINE

## 2021-02-15 PROCEDURE — 1090F PRES/ABSN URINE INCON ASSESS: CPT | Performed by: INTERNAL MEDICINE

## 2021-02-15 PROCEDURE — G8419 CALC BMI OUT NRM PARAM NOF/U: HCPCS | Performed by: INTERNAL MEDICINE

## 2021-02-15 PROCEDURE — 80053 COMPREHEN METABOLIC PANEL: CPT | Performed by: INTERNAL MEDICINE

## 2021-02-15 PROCEDURE — 80061 LIPID PANEL: CPT | Performed by: INTERNAL MEDICINE

## 2021-02-15 PROCEDURE — G8754 DIAS BP LESS 90: HCPCS | Performed by: INTERNAL MEDICINE

## 2021-02-15 PROCEDURE — G8510 SCR DEP NEG, NO PLAN REQD: HCPCS | Performed by: INTERNAL MEDICINE

## 2021-02-15 PROCEDURE — 81001 URINALYSIS AUTO W/SCOPE: CPT | Performed by: INTERNAL MEDICINE

## 2021-02-15 PROCEDURE — G8536 NO DOC ELDER MAL SCRN: HCPCS | Performed by: INTERNAL MEDICINE

## 2021-02-15 NOTE — PROGRESS NOTES
Sandi Aguillon is a 81 y.o. female presenting for Establish Care and Bladder Infection (burning and itching)  . 1. Have you been to the ER, urgent care clinic since your last visit? Hospitalized since your last visit? No    2. Have you seen or consulted any other health care providers outside of the [address] [place] [address] since your last visit? Include any pap smears or colon screening. No    Fall Risk Assessment, last 12 mths 2/15/2021   Able to walk? Yes   Fall in past 12 months? 1   Do you feel unsteady? 1   Are you worried about falling 0   Is TUG test greater than 12 seconds? 0   Is the gait abnormal? 0   Number of falls in past 12 months 1   Fall with injury? 0         Abuse Screening Questionnaire 2/15/2021   Do you ever feel afraid of your partner? N   Are you in a relationship with someone who physically or mentally threatens you? N   Is it safe for you to go home? Y       3 most recent PHQ Screens 2/15/2021   Little interest or pleasure in doing things Not at all   Feeling down, depressed, irritable, or hopeless Not at all   Total Score PHQ 2 0   Trouble falling or staying asleep, or sleeping too much -   Feeling tired or having little energy -   Poor appetite, weight loss, or overeating -   Feeling bad about yourself - or that you are a failure or have let yourself or your family down -   Trouble concentrating on things such as school, work, reading, or watching TV -   Moving or speaking so slowly that other people could have noticed; or the opposite being so fidgety that others notice -   Thoughts of being better off dead, or hurting yourself in some way -   PHQ 9 Score -   How difficult have these problems made it for you to do your work, take care of your home and get along with others -       There are no discontinued medications.

## 2021-02-15 NOTE — PATIENT INSTRUCTIONS
Learning About Low Bone Density What is low bone density? Low bone density (sometimes called osteopenia) is a decrease in thickness, or density, in bones. That means the bones become thinner and weaker. It is much more common in women than in men. It's important to know that low bone density is not a disease. It can happen normally with aging. Having low bone density means that there is a greater risk that you may get osteoporosis. It also means that you are more likely to break a bone than someone who does not have low bone density. But not everyone with low bone density gets osteoporosis or breaks a bone. Low bone density doesn't cause any symptoms. It's usually found with a type of X-ray called a bone density test. Low bone density means that your bone density result (T-score) is between 1.0 and 2.5. What increases your risk for low bone density? Things that increase your risk include: · Getting older. · Having a family history of osteoporosis. · Being thin. · Being white or . · Getting too little physical activity. · Smoking. · Drinking too much alcohol often. · Using certain medicines such as steroids. How can you prevent osteoporosis? There are things you can do to help prevent osteoporosis. Certain lifestyle changes will help slow the loss of bone density. · Eat food that has plenty of calcium and vitamin D. Yogurt, cheese, milk, and dark green vegetables are high in calcium. Eggs, fatty fish, cereal, and fortified milk are high in vitamin D. 
· Ask your doctor if you need to take a calcium plus vitamin D supplement. You may be able to get enough calcium and vitamin D through your diet. · Get regular exercise. ? Do 30 minutes of weight-bearing exercise on most days of the week. Walking, jogging, stair climbing, and dancing are good choices. ? Do resistance exercises with weights or elastic bands 2 or 3 days a week. · Limit alcohol to 2 drinks a day for men and 1 drink a day for women. Too much alcohol can cause health problems. · Do not smoke. Smoking can make bones thin faster. If you need help quitting, talk to your doctor about stop-smoking programs and medicines. These can increase your chances of quitting for good. Prescription medicines are available for treating low bone density. But these are more often used to treat osteoporosis. Follow-up care is a key part of your treatment and safety. Be sure to make and go to all appointments, and call your doctor if you are having problems. It's also a good idea to know your test results and keep a list of the medicines you take. Where can you learn more? Go to http://www.gray.com/ Enter J548 in the search box to learn more about \"Learning About Low Bone Density. \" Current as of: April 15, 2020               Content Version: 12.6 © 2006-2020 [employer]. Care instructions adapted under license by [employer] (which disclaims liability or warranty for this information). If you have questions about a medical condition or this instruction, always ask your healthcare professional. [place] [address] any warranty or liability for your use of this information. Osteoporosis: Care Instructions Your Care Instructions Osteoporosis causes bones to become thin and weak. It is much more common in women than in men. Osteoporosis may be very advanced before you know you have it. Sometimes the first sign is a broken bone in the hip, spine, or wrist or sudden pain in your middle or lower back. Follow-up care is a key part of your treatment and safety. Be sure to make and go to all appointments, and call your doctor if you are having problems. It's also a good idea to know your test results and keep a list of the medicines you take. How can you care for yourself at home? · Your doctor may prescribe a bisphosphonate, such as risedronate (Actonel) or alendronate (Fosamax), for osteoporosis. If you are taking one of these medicines by mouth: 
? Take your medicine with a full glass of water when you first get up in the morning. ? Do not lie down, eat, drink a beverage, or take any other medicine for at least 30 minutes after taking the drug. This helps prevent stomach problems. ? Do not take your medicine late in the day if you forgot to take it in the morning. Skip it, and take the usual dose the next morning. ? If you have side effects, tell your doctor. He or she may prescribe another medicine. · Get enough calcium and vitamin D. The [employer] of [employer] recommends adults younger than age 46 need 1,000 mg of calcium and 600 IU of vitamin D each day. Women ages 46 to 79 need 1,200 mg of calcium and 600 IU of vitamin D each day. Men ages 46 to 79 need 1,000 mg of calcium and 600 IU of vitamin D each day. Adults 71 and older need 1,200 mg of calcium and 800 IU of vitamin D each day. It's not clear if people who already have osteoporosis need more calcium and vitamin D than this. Talk to your doctor about what's right for you. 
? Eat foods rich in calcium, like yogurt, cheese, milk, and dark green vegetables. This is a good way to get the calcium you need. You can get vitamin D from eggs, fatty fish, cereal, and milk. ? Ask your doctor if you need to take a calcium plus vitamin D supplement. You may be able to get enough calcium and vitamin D through your diet. Be careful with supplements. Adults ages 23 to 48 should not get more than 2,500 mg of calcium and 4,000 IU of vitamin D each day, whether it is from supplements and/or food. Adults ages 46 and older should not get more than 2,000 mg of calcium and 4,000 IU of vitamin D each day from supplements and/or food. · Limit alcohol to 2 drinks a day for men and 1 drink a day for women. Too much alcohol can cause health problems. · Do not smoke. Smoking puts you at a much higher risk for osteoporosis. If you need help quitting, talk to your doctor about stop-smoking programs and medicines. These can increase your chances of quitting for good. · Get regular bone-building exercise. Weight-bearing and resistance exercises keep bones healthy by working the muscles and bones against gravity. Start out at an exercise level that feels right for you. Add a little at a time until you can do the following: ? Do 30 minutes of weight-bearing exercise on most days of the week. Walking, jogging, stair climbing, and dancing are good choices. ? Do resistance exercises with weights or elastic bands 2 to 3 days a week. · Reduce your risk of falls: 
? Wear supportive shoes with low heels and nonslip soles. ? Use a cane or walker, if you need it. Use shower chairs and bath benches. Put in handrails on stairways, around your shower or tub area, and near the toilet. ? Keep stairs, porches, and walkways well lit. Use night-lights. ? Remove throw rugs and other objects that are in the way. ? Avoid icy, wet, or slippery surfaces. ? Keep a cordless phone and a flashlight with new batteries by your bed. When should you call for help? Watch closely for changes in your health, and be sure to contact your doctor if you have any problems. Where can you learn more? Go to http://www.gray.com/ Enter K100 in the search box to learn more about \"Osteoporosis: Care Instructions. \" Current as of: April 15, 2020               Content Version: 12.6 © 8997-6800 [employer]. Care instructions adapted under license by [employer] (which disclaims liability or warranty for this information). If you have questions about a medical condition or this instruction, always ask your healthcare professional. [address] any warranty or liability for your use of this information.

## 2021-02-15 NOTE — PROGRESS NOTES
Júnior Santiago is a 81 y.o. female and presents with Establish Care and Bladder Infection (burning and itching)      Subjective:  Patient comes in today to establish care. She is a new patient to our practice. She used to work as a  in a private school. Retired.  and lives with her  of 58 years. 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 History of hypertensionwell-controlled on current meds. Hyperlipidemiaon statin. Denies muscle cramps or myalgias. Hypothyroidism, on replacement. Chronic ulcerative colitiscontinues to have 34 loose bowel movements every day. She reports previous gastroenterologist recommended colonoscopy and she wanted to defer it. She lost follow-up. Not taking probiotics? History of closed wedge compression fracture of T10/T11 vertebra s/p kyphoplasty on 10/12/2020. Chronic back pain, thoracic spondylosis, degenerative disc disease lumbar. S/p facet joint injections. Follows up with Dr. Rigoberto Ha at [address] [place] [address]    History of osteoporosisto see endocrine in February. Not on Prolia? Urinary frequencyreports [facility]  Was treated with antibiotics. Continues to have symptoms. Denies fever, chills.     Past Medical History:   Diagnosis Date    Allergic rhinitis     asthma allergies    Arthritis     Chronic kidney disease (CKD), stage III (moderate)     GERD (gastroesophageal reflux disease)     Hyperlipidemia     Hypertension     Ill-defined condition     hard of hearing    Sun-damaged skin     Sunburn, blistering     teen    Ulcerative colitis ([facility] [address] 10/28/2014     Past Surgical History:   Procedure Laterality Date    HX CATARACT REMOVAL Bilateral     HX COLONOSCOPY  9/14    ulcerative colitis    HX ORTHOPAEDIC      bilateral knee replacement     Allergies   Allergen Reactions    Cefdinir Diarrhea    Penicillin G Rash    Statins-Hmg-Coa Reductase Inhibitors Other (comments)     arthralgia     Current Outpatient Medications   Medication Sig Dispense Refill    calcitonin, salmon, (MIACALCIN) nasal 1 Swisher by Nasal route daily.  allopurinoL (ZYLOPRIM) 100 mg tablet TAKE 1 TABLET DAILY 90 Tab 3    levothyroxine (SYNTHROID) 50 mcg tablet TAKE 1 TABLET DAILY BEFORE BREAKFAST 90 Tab 3    felodipine (PLENDIL SR) 5 mg 24 hr tablet TAKE 1 TABLET DAILY 90 Tab 4    azelastine (ASTELIN) 137 mcg (0.1 %) nasal spray USE 1 SPRAY IN EACH NOSTRIL TWICE A DAY (Patient taking differently: USE 1 SPRAY IN EACH NOSTRIL TWICE A DAY, takes as needed) 3 Bottle 3    carvedilol (COREG) 3.125 mg tablet TAKE 1 TABLET TWICE A DAY WITH MEALS 180 Tab 4    famotidine (PEPCID PO) Take  by mouth as needed.  ACETAMINOPHEN PO Take  by mouth.  cholecalciferol (VITAMIN D3) 1,000 unit tablet Take 2 Tabs by mouth daily. 60 Tab 0    omega-3 fatty acids-vitamin e (FISH OIL) 1,000 mg cap Take 1 Cap by mouth.  rosuvastatin (CRESTOR) 5 mg tablet Take 5 mg by mouth nightly. Taking twice a week Mon and Fri      IBUPROFEN PO Take  by mouth as needed.        Social History     Socioeconomic History    Marital status:      Spouse name: Not on file    Number of children: Not on file    Years of education: Not on file    Highest education level: Not on file   Tobacco Use    Smoking status: Never Smoker    Smokeless tobacco: Never Used   Substance and Sexual Activity    Alcohol use: No    Drug use: No    Sexual activity: Yes     Partners: Male     Birth control/protection: None     Family History   Problem Relation Age of Onset    Diabetes Daughter     Hypertension Mother     Heart Disease Father        Review of Systems  A ten system review of constitutional, cardiovascular, respiratory, musculoskeletal, endocrine, skin, SHEENT, genitourinary, psychiatric and neurologic systems was obtained and is unremarkable with the exception of urinary frequency    Objective:  Visit Vitals  /64 (BP 1 Location: Left upper arm, BP Patient Position: Sitting, BP Cuff Size: Adult)   Pulse 89   Temp 97.8 °F (36.6 °C)   Resp 16   Ht 5' 4\" (1.626 m)   Wt 149 lb (67.6 kg)   LMP  (LMP Unknown)   SpO2 98%   BMI 25.58 kg/m²     Physical Exam:   General appearance - alert, well appearing, and in no distress  Mental status - alert, oriented to person, place, and time  EYE-SALVADOR, EOMI, fundi normal, corneas normal, no foreign bodies  ENT-ENT exam normal, no neck nodes or sinus tenderness  Nose - normal and patent, no erythema, discharge or polyps  Mouth - mucous membranes moist, pharynx normal without lesions  Neck - supple, no significant adenopathy   Chest - clear to auscultation, no wheezes, rales or rhonchi, symmetric air entry   Heart - normal rate, regular rhythm, normal S1, S2, no murmurs, rubs, clicks or gallops   Abdomen - soft, nontender, nondistended, no masses or organomegaly  Lymph- no adenopathy palpable  Ext-peripheral pulses normal, no pedal edema, no clubbing or cyanosis  Skin-Warm and dry.  no hyperpigmentation, vitiligo, or suspicious lesions  Neuro -alert, oriented, normal speech, no focal findings or movement disorder noted  Musculoskeletal- FROM, no bony abnormalities, no point tenderness    Lab Review:  Results for orders placed or performed in visit on 02/15/21   CBC W/O DIFF   Result Value Ref Range    WBC 13.4 (H) 4.1 - 10.9 K/uL    RBC 4.10 (L) 4.20 - 6.30 M/uL    HGB 13.0 12.0 - 18.0 g/dL    HCT 41.9 37.0 - 51.0 %    MCH 31.7 26.0 - 32.0 pg    MCHC 31.0 30.0 - 36.0 g/dL    .2 (H) 80.0 - 97.0 fL    RDW 14.5 %    PLATELET 176.6 973.6 - 440.0 K/uL   URINALYSIS W/MICROSCOPIC   Result Value Ref Range    Color Pale Yellow Pale Yellow - Yellow    CLARITY very cloudy (A) Clear    Glucose urine, 24 hr Negative Negative    Ketone Negative Negative    Bilirubin Negative Negative    Specific gravity 1.010 1.000 - 1.030    pH (UA) 7 5 - 7    Blood 3+ (A) Negative    Protein 3+ (A) Negative    Urobilinogen Negative Negative Nitrites pos (A) Negative    Leukocyte Esterase 3+ (A) Negative    WBC TNTC (A) 0 #/HPF    RBC 20-50 (A) 0 #/HPF    Bacteria Many (A) None Seen        Documenation Review:    Assessment/Plan:    Diagnoses and all orders for this visit:    1. Essential hypertension  -     CBC W/O DIFF  -     METABOLIC PANEL, COMPREHENSIVE    2. Mixed hyperlipidemia  -     LIPID PANEL    3. Hypothyroidism due to non-medication exogenous substances  -     TSH 3RD GENERATION    4. Glucose intolerance (impaired glucose tolerance)  -     HEMOGLOBIN A1C WITH EAG    5. Stage 3a chronic kidney disease    6. Elevated LFTs    7. Chronic bilateral low back pain without sciatica    8. Spondylolisthesis of lumbar region    9. S/P kyphoplasty    10. Urinary frequency  -     URINALYSIS W/MICROSCOPIC    11. Ulcerative colitis without complications, unspecified location ([address]  -     REFERRAL TO GASTROENTEROLOGY    12. Urinary tract infection with hematuria, site unspecified  -     CULTURE, URINE        Check UA, r/o UTI  History of osteoporosis. Not on Prolia? Plans to see endocrine. Referral for GI made. She has chronic ulcerative colitis. Continue current meds. I will call with lab results and make further recommendations or adjustments if necessary. Discussed lifestyle modifications including Na restriction, low carb/fat diet, weight reduction and exercise (at least a walking program). ICD-10-CM ICD-9-CM    1. Essential hypertension  I10 401.9 CBC W/O DIFF      METABOLIC PANEL, COMPREHENSIVE   2. Mixed hyperlipidemia  E78.2 272.2 LIPID PANEL   3. Hypothyroidism due to non-medication exogenous substances  E03.2 244.3 TSH 3RD GENERATION   4. Glucose intolerance (impaired glucose tolerance)  R73.02 790.22 HEMOGLOBIN A1C WITH EAG      CANCELED: HEMOGLOBIN A1C W/O EAG   5. Stage 3a chronic kidney disease  N18.31 585.3    6. Elevated LFTs  R79.89 790.6    7.  Chronic bilateral low back pain without sciatica  M54.5 724.2     G89.29 338.29 8. Spondylolisthesis of lumbar region  M43.16 738.4    9. S/P kyphoplasty  Z98.890 V45.89    10. Urinary frequency  R35.0 788.41 URINALYSIS W/MICROSCOPIC   11. [facility] without complications[facility]  K51.90 556.9 REFERRAL TO GASTROENTEROLOGY   12. Urinary tract infection with hematuria, site unspecified  N39.0 599.0 CULTURE, URINE    R31.9 599.70                I have reviewed with the patient details of the assessment and plan and all questions were answered. Relevent patient education was performed. Verbal and/or written instructions (see AVS) provided. The most recent lab findings were reviewed with the patient. Plan was discussed with patient who verbally expressed understanding. An After Visit Summary was printed and given to the patient.     Ramez Garrett MD

## 2021-02-16 DIAGNOSIS — N30.01 ACUTE CYSTITIS WITH HEMATURIA: Primary | ICD-10-CM

## 2021-02-16 LAB
A-G RATIO,AGRAT: 1.4 RATIO
ALBUMIN SERPL-MCNC: 4.2 G/DL (ref 3.9–5.4)
ALP SERPL-CCNC: 110 U/L (ref 38–126)
ALT SERPL-CCNC: 83 U/L (ref 0–35)
ANION GAP SERPL CALC-SCNC: 10 MMOL/L
AST SERPL W P-5'-P-CCNC: 65 U/L (ref 14–36)
BILIRUB SERPL-MCNC: 0.9 MG/DL (ref 0.2–1.3)
BUN SERPL-MCNC: 32 MG/DL (ref 7–17)
BUN/CREATININE RATIO,BUCR: 23 RATIO
CALCIUM SERPL-MCNC: 10.1 MG/DL (ref 8.4–10.2)
CHLORIDE SERPL-SCNC: 104 MMOL/L (ref 98–107)
CHOL/HDL RATIO,CHHD: 2 RATIO (ref 0–4)
CHOLEST SERPL-MCNC: 193 MG/DL (ref 0–200)
CO2 SERPL-SCNC: 28 MMOL/L (ref 22–32)
CREAT SERPL-MCNC: 1.4 MG/DL (ref 0.7–1.2)
EST. AVERAGE GLUCOSE BLD GHB EST-MCNC: 140 MG/DL
GLOBULIN,GLOB: 2.9
GLUCOSE SERPL-MCNC: 106 MG/DL (ref 65–105)
HBA1C MFR BLD: 6.5 % (ref 4.8–5.6)
HDLC SERPL-MCNC: 100 MG/DL (ref 35–130)
LDL/HDL RATIO,LDHD: 1 RATIO
LDLC SERPL CALC-MCNC: 62 MG/DL (ref 0–130)
POTASSIUM SERPL-SCNC: 4.6 MMOL/L (ref 3.6–5)
PROT SERPL-MCNC: 7.1 G/DL (ref 6.3–8.2)
SODIUM SERPL-SCNC: 142 MMOL/L (ref 137–145)
TRIGL SERPL-MCNC: 154 MG/DL (ref 0–200)
VLDLC SERPL CALC-MCNC: 31 MG/DL

## 2021-02-16 RX ORDER — NITROFURANTOIN 25; 75 MG/1; MG/1
100 CAPSULE ORAL 2 TIMES DAILY
Qty: 14 CAP | Refills: 0 | Status: SHIPPED | OUTPATIENT
Start: 2021-02-16 | End: 2021-02-21 | Stop reason: ALTCHOICE

## 2021-02-20 LAB — BACTERIA UR CULT: ABNORMAL

## 2021-02-21 DIAGNOSIS — N30.01 ACUTE CYSTITIS WITH HEMATURIA: Primary | ICD-10-CM

## 2021-02-21 RX ORDER — CIPROFLOXACIN 250 MG/1
250 TABLET, FILM COATED ORAL 2 TIMES DAILY
Qty: 14 TAB | Refills: 0 | Status: SHIPPED | OUTPATIENT
Start: 2021-02-21 | End: 2021-02-28

## 2021-02-21 NOTE — PROGRESS NOTES
Urine culture growing Proteus mirabilis. Resistant to Macrobid. Prescription for ciprofloxacin sent to patient's preferred pharmacy.   (Patient allergic to cefdinir and penicillin)

## 2021-04-19 RX ORDER — LEVOTHYROXINE SODIUM 50 UG/1
TABLET ORAL
Qty: 90 TAB | Refills: 3 | Status: SHIPPED | OUTPATIENT
Start: 2021-04-19 | End: 2022-06-06 | Stop reason: SDUPTHER

## 2021-04-19 RX ORDER — CARVEDILOL 3.12 MG/1
TABLET ORAL
Qty: 180 TAB | Refills: 4 | Status: SHIPPED | OUTPATIENT
Start: 2021-04-19 | End: 2022-06-27 | Stop reason: SDUPTHER

## 2021-04-19 RX ORDER — FELODIPINE 5 MG/1
TABLET, EXTENDED RELEASE ORAL
Qty: 90 TAB | Refills: 4 | Status: SHIPPED | OUTPATIENT
Start: 2021-04-19 | End: 2022-06-29 | Stop reason: SDUPTHER

## 2021-04-19 RX ORDER — ALLOPURINOL 100 MG/1
TABLET ORAL
Qty: 90 TAB | Refills: 3 | Status: SHIPPED | OUTPATIENT
Start: 2021-04-19 | End: 2022-06-06 | Stop reason: SDUPTHER

## 2021-04-19 NOTE — TELEPHONE ENCOUNTER
Last Refill:    Carvedilol: 1/13/2020   Levothroxine: 7/8/2020   Allopurinol: 7/8/2020   Felodipine: 3/2/2020  Last Visit: 2/15/2021   Next Visit: 5/27/2021    Requested Prescriptions     Pending Prescriptions Disp Refills    carvediloL (COREG) 3.125 mg tablet 180 Tab 4     Sig: TAKE 1 TABLET TWICE A DAY WITH MEALS    levothyroxine (SYNTHROID) 50 mcg tablet 90 Tab 3     Sig: TAKE 1 TABLET DAILY BEFORE BREAKFAST    allopurinoL (ZYLOPRIM) 100 mg tablet 90 Tab 3     Sig: TAKE 1 TABLET DAILY    felodipine (PLENDIL SR) 5 mg 24 hr tablet 90 Tab 4     Sig: TAKE 1 TABLET DAILY

## 2021-04-21 NOTE — PERIOP NOTES
[facility]  Ambulatory Surgery Unit  Pre-operative Instructions    Procedure Date  04/27            Tentative Arrival Time 1100      1. On the day of your procedure, please report to the Ambulatory Surgery Unit Registration Desk and sign in at your designated time. The Ambulatory Surgery Unit is located in [facility] on the [facility] side of the [facility] across from the [address] [place] [address]. Please have all of your health insurance cards and a photo ID. 2. You must have someone with you to drive you home as directed by your surgeon. 3. You may have a light breakfast and take normal morning medications. 4. We recommend you do not drink any alcoholic beverages for 24 hours before and after your procedure. 5. Contact your surgeons office for instructions on the following medications: non-steroidal anti-inflammatory drugs (i.e. Advil, Aleve), vitamins, and supplements. (Some surgeons will want you to stop these medications prior to surgery and others may allow you to take them)   **If you are currently taking Plavix, Coumadin, Aspirin and/or other blood-thinning agents, contact your surgeon for instructions. ** Your surgeon will partner with the physician prescribing these medications to determine if it is safe to stop or if you need to continue taking. Please do not stop taking these medications without instructions from your surgeon. 6. In an effort to help prevent surgical site infection, we ask that you shower with an anti-bacterial soap (i.e. Dial or Safeguard) on the morning of your procedure. Do not apply any lotions, powders, or deodorants after showering. 7. Wear comfortable clothes. Wear glasses instead of contacts. Do not bring any jewelry or money (other than copays or fees as instructed). Do not wear make-up, particularly mascara, the morning of your procedure. Wear your hair loose or down, no ponytails, buns, chris pins or clips.  All body piercings must be removed. 8. You should understand that if you do not follow these instructions your procedure may be cancelled. If your physical condition changes (i.e. fever, cold or flu) please contact your surgeon as soon as possible. 9. It is important that you be on time. If a situation occurs where you may be late, or if you have any questions or problems, please call [phone].    10. Your procedure time may be subject to change. You will receive a phone call the day prior to confirm your arrival time. I understand a pre-operative phone call will be made to verify my procedure time. In the event that I am not available, I give permission for a message to be left on my answering service and/or with another person?       yes         ___________________      ___________________      ___________________  (Signature of Patient)          (Witness)                   (Date and Time)

## 2021-04-27 ENCOUNTER — APPOINTMENT (OUTPATIENT)
Dept: GENERAL RADIOLOGY | Age: 86
End: 2021-04-27
Attending: PHYSICAL MEDICINE & REHABILITATION
Payer: MEDICARE

## 2021-04-27 ENCOUNTER — HOSPITAL ENCOUNTER (OUTPATIENT)
Age: 86
Setting detail: OUTPATIENT SURGERY
Discharge: HOME | End: 2021-04-27
Attending: PHYSICAL MEDICINE & REHABILITATION | Admitting: PHYSICAL MEDICINE & REHABILITATION
Payer: MEDICARE

## 2021-04-27 VITALS
BODY MASS INDEX: 25.95 KG/M2 | DIASTOLIC BLOOD PRESSURE: 60 MMHG | TEMPERATURE: 97.9 F | HEART RATE: 71 BPM | OXYGEN SATURATION: 96 % | RESPIRATION RATE: 16 BRPM | WEIGHT: 152 LBS | SYSTOLIC BLOOD PRESSURE: 138 MMHG | HEIGHT: 64 IN

## 2021-04-27 PROCEDURE — 76030000002 HC AMB SURG OR TIME FIRST 0.: Performed by: PHYSICAL MEDICINE & REHABILITATION

## 2021-04-27 PROCEDURE — 76210000050 HC AMBSU PH II REC 0.5 TO 1 HR: Performed by: PHYSICAL MEDICINE & REHABILITATION

## 2021-04-27 PROCEDURE — 72020 X-RAY EXAM OF SPINE 1 VIEW: CPT

## 2021-04-27 PROCEDURE — 76000 FLUOROSCOPY <1 HR PHYS/QHP: CPT

## 2021-04-27 PROCEDURE — 74011250636 HC RX REV CODE- 250/636: Performed by: PHYSICAL MEDICINE & REHABILITATION

## 2021-04-27 PROCEDURE — 74011000250 HC RX REV CODE- 250: Performed by: PHYSICAL MEDICINE & REHABILITATION

## 2021-04-27 PROCEDURE — 2709999900 HC NON-CHARGEABLE SUPPLY: Performed by: PHYSICAL MEDICINE & REHABILITATION

## 2021-04-27 PROCEDURE — 77030003665 HC NDL SPN BBMI -A: Performed by: PHYSICAL MEDICINE & REHABILITATION

## 2021-04-27 RX ORDER — DEXAMETHASONE SODIUM PHOSPHATE 4 MG/ML
6 INJECTION, SOLUTION INTRA-ARTICULAR; INTRALESIONAL; INTRAMUSCULAR; INTRAVENOUS; SOFT TISSUE ONCE
Status: COMPLETED | OUTPATIENT
Start: 2021-04-27 | End: 2021-04-27

## 2021-04-27 RX ORDER — LIDOCAINE HYDROCHLORIDE 20 MG/ML
10 INJECTION, SOLUTION INFILTRATION; PERINEURAL ONCE
Status: COMPLETED | OUTPATIENT
Start: 2021-04-27 | End: 2021-04-27

## 2021-04-27 RX ORDER — BUPIVACAINE HYDROCHLORIDE 5 MG/ML
10 INJECTION, SOLUTION EPIDURAL; INTRACAUDAL ONCE
Status: COMPLETED | OUTPATIENT
Start: 2021-04-27 | End: 2021-04-27

## 2021-04-27 NOTE — PERIOP NOTES
Neuro:  Push/Pull assessment:     LUE Response: equal/semistrong    LLE Response: equal push/pull   RUE Response: equal/semistrong    RLE Response: equal push/pull

## 2021-04-27 NOTE — H&P
Procedural Case Note    4/27/2021    (12:28 PM)    Marta Brandon    1935   (81 y.o.)    [id number]    CC:  pain    ROS:   Complete ROS obtained, no CP, no SOB, no N or V    PMH:     Past Medical History:   Diagnosis Date    Allergic rhinitis     asthma allergies    Arthritis     Chronic kidney disease (CKD), stage III (moderate) (HCC)     GERD (gastroesophageal reflux disease)     Hyperlipidemia     Hypertension     Ill-defined condition     hard of hearing    Sun-damaged skin     Sunburn, blistering     teen    Ulcerative colitis ([facility] [address] 10/28/2014       ALLERGIES:     Allergies   Allergen Reactions    Cefdinir Diarrhea    Penicillin G Rash    Statins-Hmg-Coa Reductase Inhibitors Other (comments)     arthralgia       MEDS:     Current Facility-Administered Medications   Medication Dose Route Frequency    bupivacaine (PF) (MARCAINE) 0.5 % (5 mg/mL) injection 50 mg  10 mL Epidural ONCE    lidocaine (XYLOCAINE) 20 mg/mL (2 %) injection 200 mg  10 mL IntraDERMal ONCE    dexamethasone (DECADRON) 4 mg/mL injection 6 mg  6 mg Intra artICUlar ONCE    iohexoL (OMNIPAQUE) 180 mg iodine/mL injection 10 mL  10 mL Intra artICUlar ONCE          Visit Vitals  Ht 5' 4\" (1.626 m)   Wt 67.6 kg (149 lb)   BMI 25.58 kg/m²     PE:  Gen: NAD  Head: normocephalic  Heart: RRR  Lungs: CTA bari  Abd: NT, ND, soft  Neuro: awake and alert  Skin: intact    IMPRESSION:   Thoracic DJD    Note:  The clinical status was discussed in detail with the patient. The procedure was again discussed and described in detail. All understand and accept the planned procedure and risks; reject other forms of treatment. All questions are answered.     Sheryl Guajardo MD

## 2021-04-27 NOTE — PERIOP NOTES
Pt has weak and equal plantar and dorsi flexion. Pt able to stand and hold weight. No swelling or bleeding at injection sites. D/c instructions reviewed with pt , all questions answered. Reviewed when to call the doctor and activity. 1307-Pt voided x 1.

## 2021-04-27 NOTE — OP NOTES
Facet Steroid Injection Operative Report    Indications: This is a 81 y.o. female who presents with LBP. She was positive for Thoracic DJD. The patient was admitted for surgery as conservative measures have failed. Date of Surgery: 4/27/2021    Preoperative Diagnosis: Thoracic DJD    Postoperative Diagnosis: Thoracic DJD    Surgeon(s) and Role:     * Molly Oliver MD - Primary     Procedure:  Procedure(s):  BILATERAL T8-9, T9-10, T10-11 FACET INJECTION    Procedure in Detail:  After appropriate informed consent was obtained, the patient was taken to the operating suite and placed in the prone position on the operating table on appropriate padding. The thoracic region was prepped and draped in the usual sterile fashion. Intraoperative fluoroscopy was used to localize the thoracic spine. The skin was infiltrated with 2% lidocaine. An 22-g needle was advanced into the Peter T8-9, T9-10, T10-11 facets under fluoroscopic guidance. Next, 4ml of 0.5% marcaine and 4mg of Dexamethasone were injected. The needle was removed from the patient. The patient was then turned back into the supine position on the stretcher and was taken to the Recovery Room in stable condition.     Estimated Blood Loss:  none     Specimens: None       Drains: None          Complications:  None    Signed By: Elise Allen MD                        April 27, 2021

## 2021-04-27 NOTE — DISCHARGE INSTRUCTIONS
Discharge Instructions    Facet Block/Medial Branch Block    You had a facet injection today. You will probably have some numbness in your lower back area for the next 6-8 hours. The steroids will slowly become effective, reducing your pain, over the next 2 weeks. You should begin feeling better after a few days, but it may take up to 2 weeks to notice the difference. The benefit you get from your injection will last a variable amount of time, depending on the severity of your spine problem. If the results you experience are significant, but not lasting a long time, you may be a candidate for a procedure that can be longer lasting (radiofrequency ablation of the nerves innervating the facet joints).  Pain:  Most people do not have any increase in pain after this injection. However, you might experience some soreness at the site of the injection. If this happens, putting an icepack over the sore area will help.  Bandage: You have a small bandage covering the site of the injection. You may remove it when you get home.  Restrictions:  Someone should drive you home after the injection. After that, you have no restrictions. You may resume your normal level of activity. You may take a shower or bath, and you may eat normally. You should continue your current exercised and/or therapy routine.  Medications:  Continue your current medications as prescribed. If your pain decreases, you may reduce the amount of your pain medicines. If you stopped taking anticoagulants or blood-thinners before the injection, start them tomorrow. If you have diabetes, your blood sugar may be elevated for a few days. Call your primary doctor with any questions.     Call Dr. Padilla  at [phone] if you experience:   Fever (101 degrees Fahrenheit or greater)   Nausea or vomiting   Headache unrelieved by your normal pain medicine   Redness or swelling at the injection site that lasts more than 1 day   New numbness, tingling, weakness, or pain that you didnt have before the injection     If still having pain in 1-2 weeks, call office at [phone] for a follow up appointment. DISCHARGE SUMMARY from Nurse    The following personal items collected during your admission are returned to you:   Dental Appliance: Dental Appliances: None  Vision:    Hearing Aid:    Jewelry: Jewelry: None  Clothing: Clothing: With patient  Other Valuables: Other Valuables: Eyeglasses, Other (comment)(hearing aids with pt)  Valuables sent to safe: If you were given prescriptions, please review the written information on prescribed medications. · You will receive a Post Operative Call from one of the Recovery Room Nurses on the day after your surgery to check on you. It is very important for us to know how you are recovering after your surgery. · You may receive an e-mail or letter in the mail from [employer] regarding your experience with us in the Ambulatory Surgery Unit. Your feedback is valuable to us and we appreciate your participation in the survey. If you have not had your influenza or pneumococcal vaccines, please follow up with your primary care physician. The discharge information has been reviewed with the patient. The patient verbalized understanding.

## 2021-04-27 NOTE — PERIOP NOTES
Uche Eagle  1935  [id number]    Situation:  Verbal report given from: KARUNA Palma RN  Procedure: Procedure(s):  BILATERAL T8-9, T9-10, T10-11 FACET INJECTION    Background:    Preoperative diagnosis: Thoracic spondylosis without myelopathy [M47.814]    Postoperative diagnosis: Thoracic spondylosis without myelopathy [A10.408]    :  Dr. Kay Vazquez:  Intra-procedure medications, procedure, and allergies reviewed        Recommendation:    Discharge patient home after discharge instructions reviewed with patient. Rest until local has worn off.

## 2021-05-27 ENCOUNTER — OFFICE VISIT (OUTPATIENT)
Dept: INTERNAL MEDICINE CLINIC | Age: 86
End: 2021-05-27
Payer: MEDICARE

## 2021-05-27 VITALS
HEIGHT: 64 IN | WEIGHT: 152 LBS | OXYGEN SATURATION: 96 % | SYSTOLIC BLOOD PRESSURE: 122 MMHG | RESPIRATION RATE: 14 BRPM | HEART RATE: 79 BPM | TEMPERATURE: 98.3 F | DIASTOLIC BLOOD PRESSURE: 60 MMHG | BODY MASS INDEX: 25.95 KG/M2

## 2021-05-27 DIAGNOSIS — M47.814 THORACIC SPONDYLOSIS WITHOUT MYELOPATHY: ICD-10-CM

## 2021-05-27 DIAGNOSIS — I10 ESSENTIAL HYPERTENSION: Primary | ICD-10-CM

## 2021-05-27 DIAGNOSIS — Z87.81 HISTORY OF VERTEBRAL COMPRESSION FRACTURE: ICD-10-CM

## 2021-05-27 DIAGNOSIS — N18.31 STAGE 3A CHRONIC KIDNEY DISEASE (HCC): ICD-10-CM

## 2021-05-27 DIAGNOSIS — M81.0 AGE-RELATED OSTEOPOROSIS WITHOUT CURRENT PATHOLOGICAL FRACTURE: ICD-10-CM

## 2021-05-27 DIAGNOSIS — K51.90 ULCERATIVE COLITIS WITHOUT COMPLICATIONS, UNSPECIFIED LOCATION (HCC): ICD-10-CM

## 2021-05-27 DIAGNOSIS — E78.2 MIXED HYPERLIPIDEMIA: ICD-10-CM

## 2021-05-27 DIAGNOSIS — E03.2 HYPOTHYROIDISM DUE TO NON-MEDICATION EXOGENOUS SUBSTANCES: ICD-10-CM

## 2021-05-27 PROCEDURE — G8536 NO DOC ELDER MAL SCRN: HCPCS | Performed by: INTERNAL MEDICINE

## 2021-05-27 PROCEDURE — G8427 DOCREV CUR MEDS BY ELIG CLIN: HCPCS | Performed by: INTERNAL MEDICINE

## 2021-05-27 PROCEDURE — 1090F PRES/ABSN URINE INCON ASSESS: CPT | Performed by: INTERNAL MEDICINE

## 2021-05-27 PROCEDURE — G8419 CALC BMI OUT NRM PARAM NOF/U: HCPCS | Performed by: INTERNAL MEDICINE

## 2021-05-27 PROCEDURE — 99214 OFFICE O/P EST MOD 30 MIN: CPT | Performed by: INTERNAL MEDICINE

## 2021-05-27 PROCEDURE — G8754 DIAS BP LESS 90: HCPCS | Performed by: INTERNAL MEDICINE

## 2021-05-27 PROCEDURE — G8432 DEP SCR NOT DOC, RNG: HCPCS | Performed by: INTERNAL MEDICINE

## 2021-05-27 PROCEDURE — G8752 SYS BP LESS 140: HCPCS | Performed by: INTERNAL MEDICINE

## 2021-05-27 PROCEDURE — 1101F PT FALLS ASSESS-DOCD LE1/YR: CPT | Performed by: INTERNAL MEDICINE

## 2021-05-27 NOTE — PROGRESS NOTES
Denisse Salomon is a 81 y.o. female presenting for Hypertension (3 mo fu)  . 1. Have you been to the ER, urgent care clinic since your last visit? Hospitalized since your last visit? No    2. Have you seen or consulted any other health care providers outside of the [address] [place] [address] since your last visit? Include any pap smears or colon screening. No    Fall Risk Assessment, last 12 mths 2/15/2021   Able to walk? Yes   Fall in past 12 months? 1   Do you feel unsteady? 1   Are you worried about falling 0   Is TUG test greater than 12 seconds? 0   Is the gait abnormal? 0   Number of falls in past 12 months 1   Fall with injury? 0         Abuse Screening Questionnaire 2/15/2021   Do you ever feel afraid of your partner? N   Are you in a relationship with someone who physically or mentally threatens you? N   Is it safe for you to go home? Y       3 most recent PHQ Screens 2/15/2021   Little interest or pleasure in doing things Not at all   Feeling down, depressed, irritable, or hopeless Not at all   Total Score PHQ 2 0   Trouble falling or staying asleep, or sleeping too much -   Feeling tired or having little energy -   Poor appetite, weight loss, or overeating -   Feeling bad about yourself - or that you are a failure or have let yourself or your family down -   Trouble concentrating on things such as school, work, reading, or watching TV -   Moving or speaking so slowly that other people could have noticed; or the opposite being so fidgety that others notice -   Thoughts of being better off dead, or hurting yourself in some way -   PHQ 9 Score -   How difficult have these problems made it for you to do your work, take care of your home and get along with others -       There are no discontinued medications.

## 2021-05-27 NOTE — PATIENT INSTRUCTIONS
Osteoporosis: Care Instructions Overview Osteoporosis causes bones to become thin and weak. It is much more common in women than in men. Your chances of getting this disease depend on several things. These factors include the thickness of your bones (bone density), as well as health, diet, and physical activity. This disease may be very advanced before you know you have it. Sometimes the first sign is a broken bone in the hip, spine, or wrist. Or you may have sudden pain in your middle or lower back. Follow-up care is a key part of your treatment and safety. Be sure to make and go to all appointments, and call your doctor if you are having problems. It's also a good idea to know your test results and keep a list of the medicines you take. How can you care for yourself at home? · Your doctor may prescribe a bisphosphonate, such as risedronate (Actonel) or alendronate (Fosamax), for osteoporosis. If you are taking one of these medicines by mouth: 
? Take your medicine with a full glass of water when you first get up in the morning. ? Do not lie down, eat, drink a beverage, or take any other medicine for at least 30 minutes after taking the drug. This helps prevent stomach problems. ? Do not take your medicine late in the day if you forgot to take it in the morning. Skip it, and take the usual dose the next morning. ? If you have side effects, tell your doctor. Your doctor may prescribe another medicine. · Get enough calcium and vitamin D. The recommended daily allowances (RDAs) for adults younger than age 46 are 1,000 mg of calcium and 600 IU of vitamin D each day. Women ages 46 to 79 need 1,200 mg of calcium and 600 IU of vitamin D each day. Men ages 46 to 79 need 1,000 mg of calcium and 600 IU of vitamin D each day. Adults 71 and older need 1,200 mg of calcium and 800 IU of vitamin D each day. It's not clear if people who already have osteoporosis need more calcium and vitamin D than this.  Talk to your doctor about what's right for you. 
? Eat foods rich in calcium, like yogurt, cheese, milk, and dark green vegetables. This is a good way to get the calcium you need. You can get vitamin D from eggs, fatty fish, cereal, and milk. ? Ask your doctor if you need to take a calcium plus vitamin D supplement. You may be able to get enough calcium and vitamin D through your diet. Be careful with supplements. Adults ages 23 to 48 should not get more than 2,500 mg of calcium and 4,000 IU of vitamin D each day, whether it is from supplements and/or food. Adults ages 46 and older should not get more than 2,000 mg of calcium and 4,000 IU of vitamin D each day from supplements and/or food. · Limit alcohol to 2 drinks a day for men and 1 drink a day for women. Too much alcohol can cause health problems. · Do not smoke. Smoking puts you at a much higher risk for osteoporosis. If you need help quitting, talk to your doctor about stop-smoking programs and medicines. These can increase your chances of quitting for good. · Get regular bone-building exercise. Weight-bearing and resistance exercises keep bones healthy by working the muscles and bones against gravity. Start out at an exercise level that feels right for you. Add a little at a time until you can do the following: ? Do 30 minutes of weight-bearing exercise on most days of the week. Walking, jogging, stair climbing, and dancing are good choices. ? Do resistance exercises with weights or elastic bands 2 to 3 days a week. · Reduce your risk of falls: 
? Wear supportive shoes with low heels and nonslip soles. ? Use a cane or walker, if you need it. Use shower chairs and bath benches. Put in handrails on stairways, around your shower or tub area, and near the toilet. ? Keep stairs, porches, and walkways well lit. Use night-lights. ? Remove throw rugs and other objects that are in the way. ? Avoid icy, wet, or slippery surfaces. ?  Keep a cordless phone and a flashlight with new batteries by your bed. When should you call for help? Watch closely for changes in your health, and be sure to contact your doctor if you have any problems. Where can you learn more? Go to http://www.gray.com/ Enter K100 in the search box to learn more about \"Osteoporosis: Care Instructions. \" Current as of: December 7, 2020               Content Version: 12.8 © 2006-2021 [employer]. Care instructions adapted under license by [employer] (which disclaims liability or warranty for this information). If you have questions about a medical condition or this instruction, always ask your healthcare professional. [address] any warranty or liability for your use of this information.

## 2021-05-27 NOTE — PROGRESS NOTES
Mason Mckeon is a 81 y.o. female and presents with Hypertension (3 mo fu)      Subjective:  She used to work as a  in a private school. Retired.  and lives with her  of 58 years. 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 History of closed wedge compression fracture of T10/T11 vertebra s/p kyphoplasty on 10/12/2020. Chronic back pain, thoracic spondylosis, degenerative disc disease lumbar. S/p facet joint injections. Follows up with Dr. Nixon Rivero at [address] [place] [address]. She recently got facet injections a week back. She reports that helps with her pain. She has a history of osteoporosis. Bone scan from October 2020 discussed with patient today. She reports she was supposed to see endocrinologist however since it was a phone call/virtual visit she was not interested and now she cannot get to see anyone earlier. She is not on any oral biphosphonate, Prolia. She is concerned about kidney injury from the Prolia? History of hypertensionwell-controlled on current meds. Hyperlipidemiaon statin. Denies muscle cramps or myalgias. Hypothyroidism, on replacement. Chronic ulcerative colitiscontinues to have 34 loose bowel movements every day. She reports previous gastroenterologist recommended colonoscopy and she wanted to defer it. She lost follow-up. Not taking probiotics?   Past Medical History:   Diagnosis Date    Allergic rhinitis     asthma allergies    Arthritis     Chronic kidney disease (CKD), stage III (moderate) (HCC)     GERD (gastroesophageal reflux disease)     Hyperlipidemia     Hypertension     Ill-defined condition     hard of hearing    Sun-damaged skin     Sunburn, blistering     teen    Ulcerative colitis ([address] 10/28/2014     Past Surgical History:   Procedure Laterality Date    HX CATARACT REMOVAL Bilateral     HX COLONOSCOPY  9/14    ulcerative colitis    HX ORTHOPAEDIC      bilateral knee replacement     Allergies   Allergen Reactions    Cefdinir Diarrhea    Penicillin G Rash    Statins-Hmg-Coa Reductase Inhibitors Other (comments)     arthralgia     Current Outpatient Medications   Medication Sig Dispense Refill    carvediloL (COREG) 3.125 mg tablet TAKE 1 TABLET TWICE A DAY WITH MEALS 180 Tab 4    levothyroxine (SYNTHROID) 50 mcg tablet TAKE 1 TABLET DAILY BEFORE BREAKFAST (Patient taking differently: Daily (before breakfast). TAKE 1 TABLET DAILY BEFORE BREAKFAST) 90 Tab 3    allopurinoL (ZYLOPRIM) 100 mg tablet TAKE 1 TABLET DAILY 90 Tab 3    felodipine (PLENDIL SR) 5 mg 24 hr tablet TAKE 1 TABLET DAILY 90 Tab 4    azelastine (ASTELIN) 137 mcg (0.1 %) nasal spray USE 1 SPRAY IN EACH NOSTRIL TWICE A DAY (Patient taking differently: USE 1 SPRAY IN EACH NOSTRIL TWICE A DAY, takes as needed) 3 Bottle 3    famotidine (PEPCID PO) Take  by mouth as needed.  ACETAMINOPHEN PO Take  by mouth.  cholecalciferol (VITAMIN D3) 1,000 unit tablet Take 2 Tabs by mouth daily. 60 Tab 0    rosuvastatin (CRESTOR) 5 mg tablet Take 5 mg by mouth nightly. Taking twice a week Mon and Fri       Social History     Socioeconomic History    Marital status:      Spouse name: Not on file    Number of children: Not on file    Years of education: Not on file    Highest education level: Not on file   Tobacco Use    Smoking status: Never Smoker    Smokeless tobacco: Never Used   Vaping Use    Vaping Use: Never used   Substance and Sexual Activity    Alcohol use: No    Drug use: No    Sexual activity: Yes     Partners: Male     Birth control/protection: None     Social Determinants of Health     Financial Resource Strain:     Difficulty of Paying Living Expenses:    Food Insecurity:     Worried About Running Out of Food in the Last Year:     [address] in the Last Year:    Transportation Needs:     Lack of Transportation (Medical):      Lack of Transportation (Non-Medical):    Physical Activity:     Days of Exercise per Week:     Minutes of Exercise per Session:    Stress:     Feeling of Stress :    Social Connections:     Frequency of Communication with Friends and Family:     Frequency of Social Gatherings with Friends and Family:     Attends [employer] Services:     Active Member of Clubs or Organizations:     Attends Club or Organization Meetings:     Marital Status:      Family History   Problem Relation Age of Onset    Diabetes Daughter     Hypertension Mother     Heart Disease Father        Review of Systems  A ten system review of constitutional, cardiovascular, respiratory, musculoskeletal, endocrine, skin, SHEENT, genitourinary, psychiatric and neurologic systems was obtained and is unremarkable with the exception of urinary frequency    Objective:  Visit Vitals  /60 (BP 1 Location: Left upper arm, BP Patient Position: Sitting, BP Cuff Size: Adult)   Pulse 79   Temp 98.3 °F (36.8 °C)   Resp 14   Ht 5' 4\" (1.626 m)   Wt 152 lb (68.9 kg)   LMP  (LMP Unknown)   SpO2 96%   BMI 26.09 kg/m²     Physical Exam:   General appearance - alert, well appearing, and in no distress  Mental status - alert, oriented to person, place, and time  EYE-SALVADOR, EOMI, fundi normal, corneas normal, no foreign bodies  ENT-ENT exam normal, no neck nodes or sinus tenderness  Nose - normal and patent, no erythema, discharge or polyps  Mouth - mucous membranes moist, pharynx normal without lesions  Neck - supple, no significant adenopathy   Chest - clear to auscultation, no wheezes, rales or rhonchi, symmetric air entry   Heart - normal rate, regular rhythm, normal S1, S2, no murmurs, rubs, clicks or gallops   Abdomen - soft, nontender, nondistended, no masses or organomegaly  Lymph- no adenopathy palpable  Ext-peripheral pulses normal, no pedal edema, no clubbing or cyanosis  Skin-Warm and dry.  no hyperpigmentation, vitiligo, or suspicious lesions  Neuro -alert, oriented, normal speech, no focal findings or movement disorder noted  Musculoskeletal- FROM, no bony abnormalities, no point tenderness    Lab Review:  Results for orders placed or performed in visit on 02/15/21   CULTURE, URINE    Specimen: Urine   Result Value Ref Range    Urine Culture, Routine (A)      Proteus mirabilis  Greater than 100,000 colony forming units per mL         Susceptibility    Proteus mirabilis -  (no method available)*     Amoxicillin/Clavulanic A S Susceptible ug/mL     Ampicillin ($) S Susceptible ug/mL     Cefepime ($$) S Susceptible ug/mL     Ceftriaxone ($) S Susceptible ug/mL     Cefuroxime ($) S Susceptible ug/mL     Ciprofloxacin ($) S Susceptible ug/mL     Ertapenem ($$$$) S Susceptible ug/mL     Gentamicin ($) S Susceptible ug/mL     Levofloxacin ($) S Susceptible ug/mL     Meropenem ($$) S Susceptible ug/mL     Nitrofurantoin R Resistant ug/mL     Piperacillin/Tazobac ($) S Susceptible ug/mL     Tetracycline R Resistant ug/mL     Tobramycin ($) S Susceptible ug/mL     Trimeth-Sulfamethoxa S Susceptible ug/mL     * Performed at:  [address] [place] [address]  [id number] Director: Flor Salas MD, Phone:  [phone]   CBC W/O DIFF   Result Value Ref Range    WBC 13.4 (H) 4.1 - 10.9 K/uL    RBC 4.10 (L) 4.20 - 6.30 M/uL    HGB 13.0 12.0 - 18.0 g/dL    HCT 41.9 37.0 - 51.0 %    MCH 31.7 26.0 - 32.0 pg    MCHC 31.0 30.0 - 36.0 g/dL    .2 (H) 80.0 - 97.0 fL    RDW 14.5 %    PLATELET 043.8 728.2 - 440.0 K/uL   LIPID PANEL   Result Value Ref Range    Cholesterol, total 193 0 - 200 mg/dL    Triglyceride 154 0 - 200 mg/dL    HDL Cholesterol 100 35 - 130 mg/dL    VLDL 31 mg/dL    LDL, calculated 62 0 - 130 mg/dL    CHOL/HDL Ratio 2 0 - 4 Ratio    LDL/HDL Ratio 1 Ratio   METABOLIC PANEL, COMPREHENSIVE   Result Value Ref Range    Glucose 106 (H) 65 - 105 mg/dL    BUN 32.0 (H) 7.0 - 17.0 mg/dL    Creatinine 1.4 (H) 0.7 - 1.2 mg/dL    Sodium 142 137 - 145 mmol/L    Potassium 4.6 3.6 - 5.0 mmol/L    Chloride 104 98 - 107 mmol/L    CO2 28.0 22.0 - 32.0 mmol/L    Calcium 10.1 8.4 - 10.2 mg/dl    Protein, total 7.1 6.3 - 8.2 g/dL    Albumin 4.2 3.9 - 5.4 g/dL    ALT (SGPT) 83 (H) 0 - 35 U/L    AST (SGOT) 65.0 (H) 14.0 - 36.0 U/L    Alk. phosphatase 110 38 - 126 U/L    Bilirubin, total 0.9 0.2 - 1.3 mg/dL    BUN/Creatinine ratio 23 Ratio    GFR est AA 43 <60 mL/min/1.73m2    GFR est non-AA 36 <60 mL/min/1.73m2    Globulin 2.90     A-G Ratio 1.4 Ratio    Anion gap 10 mmol/L   URINALYSIS W/MICROSCOPIC   Result Value Ref Range    Color Pale Yellow Pale Yellow - Yellow    CLARITY very cloudy (A) Clear    Glucose urine, 24 hr Negative Negative    Ketone Negative Negative    Bilirubin Negative Negative    Specific gravity 1.010 1.000 - 1.030    pH (UA) 7 5 - 7    Blood 3+ (A) Negative    Protein 3+ (A) Negative    Urobilinogen Negative Negative    Nitrites pos (A) Negative    Leukocyte Esterase 3+ (A) Negative    WBC TNTC (A) 0 #/HPF    RBC 20-50 (A) 0 #/HPF    Bacteria Many (A) None Seen   TSH 3RD GENERATION   Result Value Ref Range    TSH, 3rd generation 2.14 0.34 - 5.60 uIU/mL   HEMOGLOBIN A1C WITH EAG   Result Value Ref Range    Hemoglobin A1c 6.5 (H) 4.8 - 5.6 %    Estimated average glucose 140 mg/dL        Documenation Review:    Assessment/Plan:    Diagnoses and all orders for this visit:    1. Essential hypertension    2. Mixed hyperlipidemia    3. Stage 3a chronic kidney disease ([address]    4. Hypothyroidism due to non-medication exogenous substances    5. Ulcerative colitis without complications, unspecified location ([facility]    6. Thoracic spondylosis without myelopathy    7. History of vertebral compression fracture    8. Age-related osteoporosis without current pathological fracture        Reviewed bone scan results with patient today from October 2020. Recommended given her CKD stage III I would feel more comfortable starting her on Prolia.   I discussed side effect, mechanism of action and risk associated with oral biphosphonate's, Prolia and discussed other medications and diet. She will think about Prolia and get back to me if interested. Reviewed records from Dr. Jyothi Ware.  Continue current meds. Discussed lifestyle modifications including Na restriction, low carb/fat diet, weight reduction and exercise (at least a walking program). ICD-10-CM ICD-9-CM    1. Essential hypertension  I10 401.9    2. Mixed hyperlipidemia  E78.2 272.2    3. Stage 3a chronic kidney disease (HCC)  N18.31 585.3    4. 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 Ulcerative colitis without complications, unspecified location (HCC)  K51.90 556.9    6. Thoracic spondylosis without myelopathy  M47.814 721.2    7. History of vertebral compression fracture  Z87.81 V15.51    8. Age-related osteoporosis without current pathological fracture  M81.0 733.01            Follow-up and Dispositions    · Return in about 3 months (around 8/27/2021) for CPE-30mins. I have reviewed with the patient details of the assessment and plan and all questions were answered. Relevent patient education was performed. Verbal and/or written instructions (see AVS) provided. The most recent lab findings were reviewed with the patient. Plan was discussed with patient who verbally expressed understanding. An After Visit Summary was printed and given to the patient.     Courtney Melton MD

## 2021-07-06 ENCOUNTER — DOCUMENTATION ONLY (OUTPATIENT)
Dept: INTERNAL MEDICINE CLINIC | Age: 86
End: 2021-07-06

## 2021-07-06 NOTE — PROGRESS NOTES
Patient has an active referral to Dr. Rod Grace office from February. She no showed her appointment in June. Does she need to make an appointment with Dr. Christiano Kaye office?

## 2021-09-06 NOTE — PROGRESS NOTES
Mike Ordonez is a 81 y.o. female and presents with Annual Wellness Visit      Subjective:  She used to work as a  in a private school. Retired.  and lives with her  of 58 years. 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 Patient comes in today for annual Medicare wellness. Reports since last time she has been doing fine. She does have ongoing chronic back pain for which she follows up with Dr. Tremayne Jose at [address] [place] [address]. She has history of closed wedge compression fracture of T10/T11 vertebra s/p kyphoplasty on 10/12/2020. Chronic back pain, thoracic spondylosis, degenerative disc disease lumbar. S/p facet joint injections. She has a history of osteoporosis. Bone scan from October 2020 discussed with patient today. She reports she was supposed to see endocrinologist however since it was a phone call/virtual visit she was not interested and now she cannot get to see anyone earlier. She is not on any oral biphosphonate, Prolia. She is concerned about kidney injury from the Prolia? History of hypertension-well-controlled on current meds. Denies headache or blurred vision. Hyperlipidemia-on statin. Denies muscle cramps or myalgias. Hypothyroidism, on replacement. Denies heat or cold intolerance, changes in weight. Chronic ulcerative colitis-continues to have 3-4 loose bowel movements every day. She reports previous gastroenterologist recommended colonoscopy and she wanted to defer it.     Past Medical History:   Diagnosis Date    Allergic rhinitis     asthma allergies    Arthritis     Chronic kidney disease (CKD), stage III (moderate) (HCC)     GERD (gastroesophageal reflux disease)     Hyperlipidemia     Hypertension     Ill-defined condition     hard of hearing    Sun-damaged skin     Sunburn, blistering     teen    Ulcerative colitis ([facility] [address] 10/28/2014     Past Surgical History:   Procedure Laterality Date    HX CATARACT REMOVAL Bilateral     HX COLONOSCOPY 9/14    ulcerative colitis    HX ORTHOPAEDIC      bilateral knee replacement     Allergies   Allergen Reactions    Cefdinir Diarrhea    Penicillin G Rash    Statins-Hmg-Coa Reductase Inhibitors Other (comments)     arthralgia     Current Outpatient Medications   Medication Sig Dispense Refill    carvediloL (COREG) 3.125 mg tablet TAKE 1 TABLET TWICE A DAY WITH MEALS 180 Tab 4    levothyroxine (SYNTHROID) 50 mcg tablet TAKE 1 TABLET DAILY BEFORE BREAKFAST (Patient taking differently: Daily (before breakfast). TAKE 1 TABLET DAILY BEFORE BREAKFAST) 90 Tab 3    allopurinoL (ZYLOPRIM) 100 mg tablet TAKE 1 TABLET DAILY 90 Tab 3    felodipine (PLENDIL SR) 5 mg 24 hr tablet TAKE 1 TABLET DAILY 90 Tab 4    azelastine (ASTELIN) 137 mcg (0.1 %) nasal spray USE 1 SPRAY IN EACH NOSTRIL TWICE A DAY (Patient taking differently: USE 1 SPRAY IN EACH NOSTRIL TWICE A DAY, takes as needed) 3 Bottle 3    famotidine (PEPCID PO) Take  by mouth as needed.  ACETAMINOPHEN PO Take  by mouth.  cholecalciferol (VITAMIN D3) 1,000 unit tablet Take 2 Tabs by mouth daily. 60 Tab 0    rosuvastatin (CRESTOR) 5 mg tablet Take 5 mg by mouth nightly.  Taking twice a week Mon and Fri       Social History     Socioeconomic History    Marital status:      Spouse name: Not on file    Number of children: Not on file    Years of education: Not on file    Highest education level: Not on file   Tobacco Use    Smoking status: Never Smoker    Smokeless tobacco: Never Used   Vaping Use    Vaping Use: Never used   Substance and Sexual Activity    Alcohol use: No    Drug use: No    Sexual activity: Yes     Partners: Male     Birth control/protection: None     Social Determinants of Health     Financial Resource Strain:     Difficulty of Paying Living Expenses:    Food Insecurity:     Worried About Running Out of Food in the Last Year:     [address] in the Last Year:    Transportation Needs:     Lack of Transportation (Medical):  Lack of Transportation (Non-Medical):    Physical Activity:     Days of Exercise per Week:     Minutes of Exercise per Session:    Stress:     Feeling of Stress :    Social Connections:     Frequency of Communication with Friends and Family:     Frequency of Social Gatherings with Friends and Family:     Attends Congregational Services:     Active Member of Clubs or Organizations:     Attends Club or Organization Meetings:     Marital Status:      Family History   Problem Relation Age of Onset    Diabetes Daughter     Hypertension Mother     Heart Disease Father        Review of Systems  A ten system review of constitutional, cardiovascular, respiratory, musculoskeletal, endocrine, skin, SHEENT, genitourinary, psychiatric and neurologic systems was obtained and is unremarkable with the exception of urinary frequency    Objective:  Visit Vitals  /60   Pulse 78   Temp 98 °F (36.7 °C) (Oral)   Resp 18   Ht 5' 4\" (1.626 m)   Wt 11 lb 9.6 oz (5.262 kg)   LMP  (LMP Unknown)   SpO2 95%   BMI 1.99 kg/m²     Physical Exam:   General appearance - alert, well appearing, and in no distress  Mental status - alert, oriented to person, place, and time  EYE-SALVADOR, EOMI, fundi normal, corneas normal, no foreign bodies  ENT-ENT exam normal, no neck nodes or sinus tenderness  Nose - normal and patent, no erythema, discharge or polyps  Mouth - mucous membranes moist, pharynx normal without lesions  Neck - supple, no significant adenopathy   Chest - clear to auscultation, no wheezes, rales or rhonchi, symmetric air entry   Heart - normal rate, regular rhythm, normal S1, S2, no murmurs, rubs, clicks or gallops   Abdomen - soft, nontender, nondistended, no masses or organomegaly  Lymph- no adenopathy palpable  Ext-peripheral pulses normal, no pedal edema, no clubbing or cyanosis  Skin-Warm and dry.  no hyperpigmentation, vitiligo, or suspicious lesions  Neuro -alert, oriented, normal speech, no focal findings or movement disorder noted  Musculoskeletal- FROM, no bony abnormalities, no point tenderness    Left Foot: Inspection: normal.  Pulse DP: 2+ (normal). Filament test: normal sensation with micro filament. Vibratory sensation: normal.  Right Foot: Inspection: normal.  Pulse DP: 2+ (normal). Filament test: normal sensation with micro filament.   Vibratory sensation: normal.    Lab Review:  Results for orders placed or performed in visit on 02/15/21   CULTURE, URINE    Specimen: Urine   Result Value Ref Range    Urine Culture, Routine (A)      Proteus mirabilis  Greater than 100,000 colony forming units per mL         Susceptibility    Proteus mirabilis -  (no method available)*     Amoxicillin/Clavulanic A S Susceptible ug/mL     Ampicillin ($) S Susceptible ug/mL     Cefepime ($$) S Susceptible ug/mL     Ceftriaxone ($) S Susceptible ug/mL     Cefuroxime ($) S Susceptible ug/mL     Ciprofloxacin ($) S Susceptible ug/mL     Ertapenem ($$$$) S Susceptible ug/mL     Gentamicin ($) S Susceptible ug/mL     Levofloxacin ($) S Susceptible ug/mL     Meropenem ($$) S Susceptible ug/mL     Nitrofurantoin R Resistant ug/mL     Piperacillin/Tazobac ($) S Susceptible ug/mL     Tetracycline R Resistant ug/mL     Tobramycin ($) S Susceptible ug/mL     Trimeth-Sulfamethoxa S Susceptible ug/mL     * Performed at:  [address] [place] [address]  [id number] Director: Myla Lopez MD, Phone:  [phone]   CBC W/O DIFF   Result Value Ref Range    WBC 13.4 (H) 4.1 - 10.9 K/uL    RBC 4.10 (L) 4.20 - 6.30 M/uL    HGB 13.0 12.0 - 18.0 g/dL    HCT 41.9 37.0 - 51.0 %    MCH 31.7 26.0 - 32.0 pg    MCHC 31.0 30.0 - 36.0 g/dL    .2 (H) 80.0 - 97.0 fL    RDW 14.5 %    PLATELET 258.5 908.0 - 440.0 K/uL   LIPID PANEL   Result Value Ref Range    Cholesterol, total 193 0 - 200 mg/dL    Triglyceride 154 0 - 200 mg/dL    HDL Cholesterol 100 35 - 130 mg/dL    VLDL 31 mg/dL    LDL, calculated 62 0 - 130 mg/dL CHOL/HDL Ratio 2 0 - 4 Ratio    LDL/HDL Ratio 1 Ratio   METABOLIC PANEL, COMPREHENSIVE   Result Value Ref Range    Glucose 106 (H) 65 - 105 mg/dL    BUN 32.0 (H) 7.0 - 17.0 mg/dL    Creatinine 1.4 (H) 0.7 - 1.2 mg/dL    Sodium 142 137 - 145 mmol/L    Potassium 4.6 3.6 - 5.0 mmol/L    Chloride 104 98 - 107 mmol/L    CO2 28.0 22.0 - 32.0 mmol/L    Calcium 10.1 8.4 - 10.2 mg/dl    Protein, total 7.1 6.3 - 8.2 g/dL    Albumin 4.2 3.9 - 5.4 g/dL    ALT (SGPT) 83 (H) 0 - 35 U/L    AST (SGOT) 65.0 (H) 14.0 - 36.0 U/L    Alk. phosphatase 110 38 - 126 U/L    Bilirubin, total 0.9 0.2 - 1.3 mg/dL    BUN/Creatinine ratio 23 Ratio    GFR est AA 43 <60 mL/min/1.73m2    GFR est non-AA 36 <60 mL/min/1.73m2    Globulin 2.90     A-G Ratio 1.4 Ratio    Anion gap 10 mmol/L   URINALYSIS W/MICROSCOPIC   Result Value Ref Range    Color Pale Yellow Pale Yellow - Yellow    CLARITY very cloudy (A) Clear    Glucose urine, 24 hr Negative Negative    Ketone Negative Negative    Bilirubin Negative Negative    Specific gravity 1.010 1.000 - 1.030    pH (UA) 7 5 - 7    Blood 3+ (A) Negative    Protein 3+ (A) Negative    Urobilinogen Negative Negative    Nitrites pos (A) Negative    Leukocyte Esterase 3+ (A) Negative    WBC TNTC (A) 0 #/HPF    RBC 20-50 (A) 0 #/HPF    Bacteria Many (A) None Seen   TSH 3RD GENERATION   Result Value Ref Range    TSH, 3rd generation 2.14 0.34 - 5.60 uIU/mL   HEMOGLOBIN A1C WITH EAG   Result Value Ref Range    Hemoglobin A1c 6.5 (H) 4.8 - 5.6 %    Estimated average glucose 140 mg/dL        Documenation Review:    Assessment/Plan:    Diagnoses and all orders for this visit:    1. Well woman exam (no gynecological exam)    2. Mixed hyperlipidemia  -     LIPID PANEL; Future    3. Hypertension, renal disease  -     CBC W/O DIFF; Future  -     METABOLIC PANEL, COMPREHENSIVE; Future  -     URINALYSIS W/ RFLX MICROSCOPIC; Future    4. Stage 3a chronic kidney disease ([facility] [address]    5.  Hypothyroidism due to non-medication exogenous substances  -     T4, FREE; Future  -     TSH 3RD GENERATION; Future    6. Ulcerative colitis without complications, unspecified location ([facility] [address]    7. History of vertebral compression fracture    8. Age-related osteoporosis without current pathological fracture    9. Glucose intolerance (impaired glucose tolerance)  -     HEMOGLOBIN A1C WITH EAG; Future  -      DIABETES FOOT EXAM  -     MICROALBUMIN, UR, RAND W/ MICROALB/CREAT RATIO; Future    10. Vitamin D deficiency  -     VITAMIN D, 25 HYDROXY; Future      Reviewed prior records. Patient has a history of chronic inflammation, presumptive diagnosis of ulcerative colitis. Not currently on any medication and benefit from a low fiber diet. The real concern is of colon cancer manual colonoscopy showed large polyps in the past.  She has been counseled on the risks. She declines any further colonoscopy or further testing at this point. Reviewed bone scan results with patient today from October 2020. Recommended given her CKD stage III, I would feel more comfortable starting her on Prolia. I discussed side effect, mechanism of action and risk associated with oral biphosphonate's, Prolia and discussed other medications and diet. Patient is interested in starting Prolia. I told her to get a dental clearance and we can set her up for infusion. She voiced understanding. Given her age she does not need future mammograms or Pap smears. Eye examination is current. Patient is up-to-date on all immunization including COVID-19 vaccination except shingles vaccine. Continue current meds. I will call with lab results and make further recommendations or adjustments if necessary. Discussed lifestyle modifications including Na restriction, low carb/fat diet, weight reduction and exercise (at least a walking program). ICD-10-CM ICD-9-CM    1. Well woman exam (no gynecological exam)  Z00.00 V70.0    2. Mixed hyperlipidemia  E78.2 272.2 LIPID PANEL   3. Hypertension, renal disease  I12.9 403.90 CBC W/O DIFF     683.0 METABOLIC PANEL, COMPREHENSIVE      URINALYSIS W/ RFLX MICROSCOPIC   4. Stage 3a chronic kidney disease (HCC)  N18.31 585.3    5. Hypothyroidism due to non-medication exogenous substances  E03.2 244.3 T4, FREE      TSH 3RD GENERATION   6. Ulcerative colitis without complications, unspecified location ([facility])  K51.90 556.9    7. History of vertebral compression fracture  Z87.81 V15.51    8. Age-related osteoporosis without current pathological fracture  M81.0 733.01    9. Glucose intolerance (impaired glucose tolerance)  R73.02 790.22 HEMOGLOBIN A1C WITH EAG      HM DIABETES FOOT EXAM      MICROALBUMIN, UR, RAND W/ MICROALB/CREAT RATIO   10. Vitamin D deficiency  E55.9 268.9 VITAMIN D, 25 HYDROXY           Follow-up and Dispositions    · Return in about 3 months (around 12/7/2021) for follow up. I have reviewed with the patient details of the assessment and plan and all questions were answered. Relevent patient education was performed. Verbal and/or written instructions (see AVS) provided. The most recent lab findings were reviewed with the patient. Plan was discussed with patient who verbally expressed understanding. An After Visit Summary was printed and given to the patient.     Naren Howell MD

## 2021-09-07 ENCOUNTER — OFFICE VISIT (OUTPATIENT)
Dept: INTERNAL MEDICINE CLINIC | Age: 86
End: 2021-09-07
Payer: MEDICARE

## 2021-09-07 VITALS
BODY MASS INDEX: 1.98 KG/M2 | OXYGEN SATURATION: 95 % | TEMPERATURE: 98 F | DIASTOLIC BLOOD PRESSURE: 60 MMHG | HEART RATE: 78 BPM | HEIGHT: 64 IN | WEIGHT: 11.6 LBS | SYSTOLIC BLOOD PRESSURE: 110 MMHG | RESPIRATION RATE: 18 BRPM

## 2021-09-07 DIAGNOSIS — I12.9 HYPERTENSION, RENAL DISEASE: ICD-10-CM

## 2021-09-07 DIAGNOSIS — M54.50 CHRONIC BILATERAL LOW BACK PAIN WITHOUT SCIATICA: ICD-10-CM

## 2021-09-07 DIAGNOSIS — E03.2 HYPOTHYROIDISM DUE TO NON-MEDICATION EXOGENOUS SUBSTANCES: ICD-10-CM

## 2021-09-07 DIAGNOSIS — N18.31 STAGE 3A CHRONIC KIDNEY DISEASE (HCC): ICD-10-CM

## 2021-09-07 DIAGNOSIS — M43.16 SPONDYLOLISTHESIS OF LUMBAR REGION: ICD-10-CM

## 2021-09-07 DIAGNOSIS — M81.0 AGE-RELATED OSTEOPOROSIS WITHOUT CURRENT PATHOLOGICAL FRACTURE: ICD-10-CM

## 2021-09-07 DIAGNOSIS — Z87.81 HISTORY OF VERTEBRAL COMPRESSION FRACTURE: ICD-10-CM

## 2021-09-07 DIAGNOSIS — K51.90 ULCERATIVE COLITIS WITHOUT COMPLICATIONS, UNSPECIFIED LOCATION (HCC): ICD-10-CM

## 2021-09-07 DIAGNOSIS — R73.02 GLUCOSE INTOLERANCE (IMPAIRED GLUCOSE TOLERANCE): ICD-10-CM

## 2021-09-07 DIAGNOSIS — E55.9 VITAMIN D DEFICIENCY: ICD-10-CM

## 2021-09-07 DIAGNOSIS — E78.2 MIXED HYPERLIPIDEMIA: ICD-10-CM

## 2021-09-07 DIAGNOSIS — Z00.00 WELL WOMAN EXAM (NO GYNECOLOGICAL EXAM): Primary | ICD-10-CM

## 2021-09-07 DIAGNOSIS — G89.29 CHRONIC BILATERAL LOW BACK PAIN WITHOUT SCIATICA: ICD-10-CM

## 2021-09-07 PROCEDURE — 1101F PT FALLS ASSESS-DOCD LE1/YR: CPT | Performed by: INTERNAL MEDICINE

## 2021-09-07 PROCEDURE — G8536 NO DOC ELDER MAL SCRN: HCPCS | Performed by: INTERNAL MEDICINE

## 2021-09-07 PROCEDURE — G0439 PPPS, SUBSEQ VISIT: HCPCS | Performed by: INTERNAL MEDICINE

## 2021-09-07 PROCEDURE — G8419 CALC BMI OUT NRM PARAM NOF/U: HCPCS | Performed by: INTERNAL MEDICINE

## 2021-09-07 PROCEDURE — 99214 OFFICE O/P EST MOD 30 MIN: CPT | Performed by: INTERNAL MEDICINE

## 2021-09-07 PROCEDURE — G8510 SCR DEP NEG, NO PLAN REQD: HCPCS | Performed by: INTERNAL MEDICINE

## 2021-09-07 PROCEDURE — G8427 DOCREV CUR MEDS BY ELIG CLIN: HCPCS | Performed by: INTERNAL MEDICINE

## 2021-09-07 PROCEDURE — 1090F PRES/ABSN URINE INCON ASSESS: CPT | Performed by: INTERNAL MEDICINE

## 2021-09-07 NOTE — PATIENT INSTRUCTIONS
Back Pain: Care Instructions  Your Care Instructions     Back pain has many possible causes. It is often related to problems with muscles and ligaments of the back. It may also be related to problems with the nerves, discs, or bones of the back. Moving, lifting, standing, sitting, or sleeping in an awkward way can strain the back. Sometimes you don't notice the injury until later. Arthritis is another common cause of back pain. Although it may hurt a lot, back pain usually improves on its own within several weeks. Most people recover in 12 weeks or less. Using good home treatment and being careful not to stress your back can help you feel better sooner. Follow-up care is a key part of your treatment and safety. Be sure to make and go to all appointments, and call your doctor if you are having problems. It's also a good idea to know your test results and keep a list of the medicines you take. How can you care for yourself at home? · Sit or lie in positions that are most comfortable and reduce your pain. Try one of these positions when you lie down:  ? Lie on your back with your knees bent and supported by large pillows. ? Lie on the floor with your legs on the seat of a sofa or chair. ? Lie on your side with your knees and hips bent and a pillow between your legs. ? Lie on your stomach if it does not make pain worse. · Do not sit up in bed, and avoid soft couches and twisted positions. Bed rest can help relieve pain at first, but it delays healing. Avoid bed rest after the first day of back pain. · Change positions every 30 minutes. If you must sit for long periods of time, take breaks from sitting. Get up and walk around, or lie in a comfortable position. · Try using a heating pad on a low or medium setting for 15 to 20 minutes every 2 or 3 hours. Try a warm shower in place of one session with the heating pad. · You can also try an ice pack for 10 to 15 minutes every 2 to 3 hours.  Put a thin cloth between the ice pack and your skin. · Take pain medicines exactly as directed. ? If the doctor gave you a prescription medicine for pain, take it as prescribed. ? If you are not taking a prescription pain medicine, ask your doctor if you can take an over-the-counter medicine. · Take short walks several times a day. You can start with 5 to 10 minutes, 3 or 4 times a day, and work up to longer walks. Walk on level surfaces and avoid hills and stairs until your back is better. · Return to work and other activities as soon as you can. Continued rest without activity is usually not good for your back. · To prevent future back pain, do exercises to stretch and strengthen your back and stomach. Learn how to use good posture, safe lifting techniques, and proper body mechanics. When should you call for help? Call your doctor now or seek immediate medical care if:    · You have new or worsening numbness in your legs.     · You have new or worsening weakness in your legs. (This could make it hard to stand up.)     · You lose control of your bladder or bowels. Watch closely for changes in your health, and be sure to contact your doctor if:    · You have a fever, lose weight, or don't feel well.     · You do not get better as expected. Where can you learn more? Go to http://www.montilla.com/  Enter I594 in the search box to learn more about \"Back Pain: Care Instructions. \"  Current as of: November 16, 2020               Content Version: 12.8  © 6712-0775 [employer]. Care instructions adapted under license by [employer] (which disclaims liability or warranty for this information). If you have questions about a medical condition or this instruction, always ask your healthcare professional. [place] [address] any warranty or liability for your use of this information.

## 2021-09-07 NOTE — PROGRESS NOTES
Chief Complaint   Patient presents with    Annual Wellness Visit       Depression Risk Factor Screening:     3 most recent PHQ Screens 9/7/2021   Little interest or pleasure in doing things Not at all   Feeling down, depressed, irritable, or hopeless Not at all   Total Score PHQ 2 0   Trouble falling or staying asleep, or sleeping too much -   Feeling tired or having little energy -   Poor appetite, weight loss, or overeating -   Feeling bad about yourself - or that you are a failure or have let yourself or your family down -   Trouble concentrating on things such as school, work, reading, or watching TV -   Moving or speaking so slowly that other people could have noticed; or the opposite being so fidgety that others notice -   Thoughts of being better off dead, or hurting yourself in some way -   PHQ 9 Score -   How difficult have these problems made it for you to do your work, take care of your home and get along with others -       Functional Ability and Level of Safety:     Activities of Daily Living  ADL Assessment 2/15/2021   Feeding yourself No Help Needed   Getting from bed to chair No Help Needed   Getting dressed No Help Needed   Bathing or showering No Help Needed   Walk across the room (includes cane/walker) No Help Needed   Using the telphone No Help Needed   Taking your medications No Help Needed   Preparing meals No Help Needed   Managing money (expenses/bills) No Help Needed   Moderately strenuous housework (laundry) No Help Needed   Shopping for personal items (toiletries/medicines) No Help Needed   Shopping for groceries No Help Needed   Driving No Help Needed   Climbing a flight of stairs No Help Needed   Getting to places beyond walking distances No Help Needed       Fall Risk  Fall Risk Assessment, last 12 mths 9/7/2021   Able to walk? Yes   Fall in past 12 months? 0   Do you feel unsteady? 0   Are you worried about falling 0   Is TUG test greater than 12 seconds?  -   Is the gait abnormal? - Number of falls in past 12 months -   Fall with injury? -     Genie Hocestela is a 81 y.o. female  HIPAA verified by two patient identifiers. Visit Vitals  /60   Pulse 78   Temp 98 °F (36.7 °C) (Oral)   Resp 18   Ht 5' 4\" (1.626 m)   Wt 11 lb 9.6 oz (5.262 kg)   LMP  (LMP Unknown)   SpO2 95%   BMI 1.99 kg/m²       Pain Scale: 5/10  Pain Location: Back  1. Have you been to the ER, urgent care clinic since your last visit? Hospitalized since your last visit? No    2. Have you seen or consulted any other health care providers outside of the [address] [place] [address] since your last visit? Include any pap smears or colon screening. No                           Abuse Screen  Abuse Screening Questionnaire 2/15/2021   Do you ever feel afraid of your partner? N   Are you in a relationship with someone who physically or mentally threatens you? N   Is it safe for you to go home?  Y         Patient Care Team   Patient Care Team:  Colt Webster MD as PCP - General (Internal Medicine)  Colt Webster MD as PCP - [facility] Empaneled Provider  Serene Dye MD as Consulting Provider (Endocrinology)

## 2021-09-08 LAB
25(OH)D3 SERPL-MCNC: 52.8 NG/ML (ref 30–100)
ALBUMIN SERPL-MCNC: 3.7 G/DL (ref 3.5–5)
ALBUMIN/GLOB SERPL: 0.8 {RATIO} (ref 1.1–2.2)
ALP SERPL-CCNC: 109 U/L (ref 45–117)
ALT SERPL-CCNC: 29 U/L (ref 12–78)
ANION GAP SERPL CALC-SCNC: 6 MMOL/L (ref 5–15)
APPEARANCE UR: CLEAR
AST SERPL-CCNC: 32 U/L (ref 15–37)
BILIRUB SERPL-MCNC: 0.8 MG/DL (ref 0.2–1)
BILIRUB UR QL: NEGATIVE
BUN SERPL-MCNC: 36 MG/DL (ref 6–20)
BUN/CREAT SERPL: 20 (ref 12–20)
CALCIUM SERPL-MCNC: 9.8 MG/DL (ref 8.5–10.1)
CHLORIDE SERPL-SCNC: 108 MMOL/L (ref 97–108)
CHOLEST SERPL-MCNC: 190 MG/DL
CO2 SERPL-SCNC: 26 MMOL/L (ref 21–32)
COLOR UR: NORMAL
CREAT SERPL-MCNC: 1.8 MG/DL (ref 0.55–1.02)
CREAT UR-MCNC: 97.5 MG/DL
ERYTHROCYTE [DISTWIDTH] IN BLOOD BY AUTOMATED COUNT: 16.3 % (ref 11.5–14.5)
EST. AVERAGE GLUCOSE BLD GHB EST-MCNC: 140 MG/DL
GLOBULIN SER CALC-MCNC: 4.4 G/DL (ref 2–4)
GLUCOSE SERPL-MCNC: 119 MG/DL (ref 65–100)
GLUCOSE UR STRIP.AUTO-MCNC: NEGATIVE MG/DL
HBA1C MFR BLD: 6.5 % (ref 4–5.6)
HCT VFR BLD AUTO: 42.7 % (ref 35–47)
HDLC SERPL-MCNC: 69 MG/DL
HDLC SERPL: 2.8 {RATIO} (ref 0–5)
HGB BLD-MCNC: 13.3 G/DL (ref 11.5–16)
HGB UR QL STRIP: NEGATIVE
KETONES UR QL STRIP.AUTO: NEGATIVE MG/DL
LDLC SERPL CALC-MCNC: 95.4 MG/DL (ref 0–100)
LEUKOCYTE ESTERASE UR QL STRIP.AUTO: NEGATIVE
MCH RBC QN AUTO: 30.4 PG (ref 26–34)
MCHC RBC AUTO-ENTMCNC: 31.1 G/DL (ref 30–36.5)
MCV RBC AUTO: 97.5 FL (ref 80–99)
MICROALBUMIN UR-MCNC: 1.27 MG/DL
MICROALBUMIN/CREAT UR-RTO: 13 MG/G (ref 0–30)
NITRITE UR QL STRIP.AUTO: NEGATIVE
NRBC # BLD: 0 K/UL (ref 0–0.01)
NRBC BLD-RTO: 0 PER 100 WBC
PH UR STRIP: 5 [PH] (ref 5–8)
PLATELET # BLD AUTO: 238 K/UL (ref 150–400)
PMV BLD AUTO: 13 FL (ref 8.9–12.9)
POTASSIUM SERPL-SCNC: 4.8 MMOL/L (ref 3.5–5.1)
PROT SERPL-MCNC: 8.1 G/DL (ref 6.4–8.2)
PROT UR STRIP-MCNC: NEGATIVE MG/DL
RBC # BLD AUTO: 4.38 M/UL (ref 3.8–5.2)
SODIUM SERPL-SCNC: 140 MMOL/L (ref 136–145)
SP GR UR REFRACTOMETRY: 1.01 (ref 1–1.03)
T4 FREE SERPL-MCNC: 1.3 NG/DL (ref 0.8–1.5)
TRIGL SERPL-MCNC: 128 MG/DL (ref ?–150)
TSH SERPL DL<=0.05 MIU/L-ACNC: 2.61 UIU/ML (ref 0.36–3.74)
UR CULT HOLD, URHOLD: NORMAL
UROBILINOGEN UR QL STRIP.AUTO: 0.2 EU/DL (ref 0.2–1)
VLDLC SERPL CALC-MCNC: 25.6 MG/DL
WBC # BLD AUTO: 10.9 K/UL (ref 3.6–11)

## 2021-09-08 NOTE — PROGRESS NOTES
A1c stable around 6. 5. Your kidney function (serum creatinine) is slightly above. Increase hydration. Labs to be checked after 3 months.

## 2022-03-19 PROBLEM — M10.9 GOUT: Status: ACTIVE | Noted: 2018-09-04

## 2022-03-19 PROBLEM — R73.02 GLUCOSE INTOLERANCE (IMPAIRED GLUCOSE TOLERANCE): Status: ACTIVE | Noted: 2017-02-03

## 2022-06-06 RX ORDER — ALLOPURINOL 100 MG/1
TABLET ORAL
Qty: 90 TABLET | Refills: 3 | Status: SHIPPED | OUTPATIENT
Start: 2022-06-06 | End: 2022-06-29 | Stop reason: SDUPTHER

## 2022-06-06 RX ORDER — LEVOTHYROXINE SODIUM 50 UG/1
TABLET ORAL
Qty: 90 TABLET | Refills: 3 | Status: SHIPPED | OUTPATIENT
Start: 2022-06-06 | End: 2022-06-29 | Stop reason: SDUPTHER

## 2022-06-06 NOTE — TELEPHONE ENCOUNTER
PCP: Robb Saldana MD    Last appt: 9/7/2021  Future Appointments   Date Time Provider Lissa Rodriguez   6/29/2022  2:30 PM Elba Del Valle MD [facility] AMB       Requested Prescriptions     Pending Prescriptions Disp Refills    levothyroxine (SYNTHROID) 50 mcg tablet 90 Tablet 3     Sig: TAKE 1 TABLET DAILY BEFORE BREAKFAST    allopurinoL (ZYLOPRIM) 100 mg tablet 90 Tablet 3     Sig: TAKE 1 TABLET DAILY       Prior labs and Blood pressures:  BP Readings from Last 3 Encounters:   09/07/21 110/60   05/27/21 122/60   04/27/21 138/60     Lab Results   Component Value Date/Time    Sodium 140 09/07/2021 10:20 AM    Potassium 4.8 09/07/2021 10:20 AM    Chloride 108 09/07/2021 10:20 AM    CO2 26 09/07/2021 10:20 AM    Anion gap 6 09/07/2021 10:20 AM    Glucose 119 (H) 09/07/2021 10:20 AM    BUN 36 (H) 09/07/2021 10:20 AM    Creatinine 1.80 (H) 09/07/2021 10:20 AM    BUN/Creatinine ratio 20 09/07/2021 10:20 AM    GFR est AA 32 (L) 09/07/2021 10:20 AM    GFR est non-AA 27 (L) 09/07/2021 10:20 AM    Calcium 9.8 09/07/2021 10:20 AM

## 2022-06-27 PROBLEM — M85.80 OSTEOPENIA: Status: ACTIVE | Noted: 2020-10-01

## 2022-06-27 RX ORDER — CARVEDILOL 3.12 MG/1
TABLET ORAL
Qty: 180 TABLET | Refills: 4 | Status: SHIPPED | OUTPATIENT
Start: 2022-06-27 | End: 2022-06-29 | Stop reason: SDUPTHER

## 2022-06-27 NOTE — PROGRESS NOTES
Subjective:     Chief Complaint   Patient presents with   [address]       Tamie Anne is a 81 y.o. F.  She has a history of hyperlipidemia and hypertension. I reviewed the medical record. The patient was last seen by Dr. Nita Banuelos in September of last year. She was there for an annual wellness visit. She was generally feeling well the time. Her A1c was noted to be stable at 6.5%. She was advised to increase hydration given a finding of slightly elevated serum creatinine. No other changes were made to her medication regimen at the time. Today, the patient comes in for routine preventive care, establishment, and follow-up on her chronic medical concerns. She reports feeling fine overall today and has only a couple of questions/somatic complaints. She wears hearing aids because of her difficulty with hearing, and wonders if these have contributed to wax buildup in her ear canals. In addition, she has been having some dry mouth recently. She had gone to the dentist, where she had been told by the dentist that her mouth was dry which was contributing to dental caries. She thinks that there might be a medication that could be contributing to this. She otherwise reports feeling fine. She continues on carvedilol and felodipine for her blood pressure, and reports tolerating these well without any lightheadedness, dizziness, chest pain, or any other cardiopulmonary or focal neurological symptoms. She also continues on rosuvastatin 5 mg daily for her history of hypercholesterolemia. She reports tolerating this well. She has a listed history of statin intolerance but has not had any myalgias or GI symptoms with this medication. Previous laboratory studies are reviewed. These had indicated normal thyroid functioning earlier this year as well as an A1c consistent with diabetes mellitus. She is not currently taking any other medications for diabetes at this time.   Her last A1c was noted to be well controlled at 6.5%. She also has a history of chronic kidney disease stage III-IV that has been stable over the past few years. She is not currently followed by a nephrologist.  I noted to the patient that her previous DEXA scan had indicated osteopenia with increased fracture risk; the patient notes that she had undergone kyphoplasty previously secondary to compression fractures, and would like to think about the possibility of medications for this. She continues on calcium and vitamin D supplementation. The patient continues on levothyroxine 50 mcg daily, and denies any weight changes, temperature intolerance, or changes in bowel habits. She is noted to have a history of ulcerative colitis, but denies having had any recent abdominal pain, hematochezia, fevers, or weight changes or changes in appetite. She declines a referral back to gastroenterology, saying that she would prefer never to have a colonoscopy ever again if possible. She is not currently taking any medications for this at this time. 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 On further questioning, the patient says that she has seasonal allergies, for which she has been using cetirizine on a daily basis. She says that she typically uses because of nasal congestion, rhinorrhea, and itchy eyes. She finds the medication only to be somewhat helpful and actually finds it more sedating than helpful for her allergy symptoms. She has not tried nasal fluticasone or nasal saline rinses in the past.    Routine Healthcare Maintenance issues are reviewed and discussed with the patient as noted below. Orders to update gaps in healthcare maintenance were placed as noted below in the Assessment and Plan, where applicable. I discussed with the patient the Shingrix vaccination. She has an eye examination already pending for January of next year. Her review of systems is otherwise negative. Physical examination is generally unremarkable. Her gait is normal and get up and go is less than 10 seconds. Past Medical History:  Past Medical History:   Diagnosis Date    Acquired hypothyroidism     Allergic rhinitis     asthma allergies    Arthritis     Cirrhosis ([facility] 09/2020    CT finding (nodular liver)    CKD stage G3a/A1, GFR 45-59 and albumin creatinine ratio <30 mg/g (HCC)     Diabetes mellitus (HCC)     GERD (gastroesophageal reflux disease)     Gout     Hard of hearing     hard of hearing    History of blistering sunburn     teen; Sunburn, blistering [L55.1]    Hyperlipidemia     Hypertension     Osteopenia 10/2020    FRAX 17.3/3.6%    Ulcerative colitis ([facility] 10/28/2014       Past Surgical Histor:  Past Surgical History:   Procedure Laterality Date    HX CATARACT REMOVAL Bilateral     HX COLONOSCOPY  9/14    ulcerative colitis    HX ORTHOPAEDIC      bilateral knee replacement       Allergies: Allergies   Allergen Reactions    Cefdinir Diarrhea    Penicillin G Rash    Statins-Hmg-Coa Reductase Inhibitors Other (comments)     arthralgia       Medications:  Current Outpatient Medications   Medication Sig Dispense Refill    vit C,C-Yn-lsxap-lutein-zeaxan (PreserVision AREDS-2) 250-90-40-1 mg cap capsule Take  by mouth.  carvediloL (COREG) 3.125 mg tablet TAKE 1 TABLET TWICE A DAY WITH MEALS 180 Tablet 4    rosuvastatin (Crestor) 5 mg tablet Take 1 Tablet by mouth nightly for 360 days. Taking twice a week Mon and Fri 90 Tablet 3    felodipine (PLENDIL SR) 5 mg 24 hr tablet TAKE 1 TABLET DAILY 90 Tablet 4    allopurinoL (ZYLOPRIM) 100 mg tablet TAKE 1 TABLET DAILY 90 Tablet 3    levothyroxine (SYNTHROID) 50 mcg tablet TAKE 1 TABLET DAILY BEFORE BREAKFAST 90 Tablet 3    cholecalciferol (VITAMIN D3) (1000 Units /25 mcg) tablet Take 2 Tablets by mouth daily. 60 Tablet 0    famotidine (PEPCID PO) Take  by mouth as needed.       ACETAMINOPHEN PO Take  by mouth. Social History:  Social History     Socioeconomic History    Marital status:    Tobacco Use    Smoking status: Never Smoker    Smokeless tobacco: Never Used   Vaping Use    Vaping Use: Never used   Substance and Sexual Activity    Alcohol use: No    Drug use: No    Sexual activity: Yes     Partners: Male     Birth control/protection: None       Family History:  Family History   Problem Relation Age of Onset    Diabetes Daughter     Hypertension Mother     Heart Disease Father        Immunizations:  Immunization History   Administered Date(s) Administered    COVID-19, MODERNA BLUE border, Primary or Immunocompromised, (age 18y+), IM, 100 mcg/0.5mL 03/06/2021, 04/05/2021    COVID-19, MODERNA Booster BLUE border, (age 18y+), IM, 50mcg/0.25mL 11/16/2021    Influenza High Dose Vaccine PF 09/21/2015, 09/15/2016, 10/06/2017, 10/07/2019    Influenza Vaccine 09/12/2014    Influenza Vaccine (Tri) Adjuvanted (>65 Yrs FLUAD TRI 70578) 09/04/2018    Pneumococcal Conjugate (PCV-13) 11/04/2014    Pneumococcal Polysaccharide (PPSV-23) 04/09/2013    Td, Adsorbed 07/06/2017    Zoster Vaccine, Live 11/04/2014        Healthcare Maintenance:  Health Maintenance   Topic Date Due    Shingrix Vaccine Age 50> (1 of 2) Never done    Eye Exam Retinal or Dilated  01[birth year]01[birth year]2023 (Originally 3/26/1945)    Flu Vaccine (Season Ended) 09/01/2022    Depression Screen  09/07/2022    Foot Exam Q1  09/07/2022    MICROALBUMIN Q1  09/07/2022    Lipid Screen  09/07/2022    Medicare Yearly Exam  09/08/2022    DTaP/Tdap/Td series (3 - Td or Tdap) 07/06/2027    Bone Densitometry (Dexa) Screening  Completed    COVID-19 Vaccine  Completed    Pneumococcal 65+ years  Completed        Review of Systems:  ROS:  Review of Systems   Constitutional: Negative. HENT: Negative. Eyes: Negative. Respiratory: Negative. Cardiovascular: Negative. Gastrointestinal: Negative. Genitourinary: Negative. Musculoskeletal: Negative. Skin: Negative. Neurological: Negative. Endo/Heme/Allergies: Negative. Psychiatric/Behavioral: Negative. ROS otherwise negative      Objective:     Vital Signs:  Visit Vitals  /71 (BP 1 Location: Right arm, BP Patient Position: Sitting, BP Cuff Size: Large adult)   Pulse 79   Temp 98.3 °F (36.8 °C) (Oral)   Resp 18   Ht 5' 4\" (1.626 m)   Wt 141 lb 4.8 oz (64.1 kg)   LMP  (LMP Unknown)   SpO2 93%   BMI 24.25 kg/m²       BMI:  Body mass index is 24.25 kg/m². Physical Examination:  Physical Exam  Constitutional:       Appearance: Normal appearance. She is normal weight. HENT:      Head: Normocephalic and atraumatic. Right Ear: External ear normal.      Left Ear: External ear normal.      Nose: Nose normal.      Mouth/Throat:      Mouth: Mucous membranes are moist.      Pharynx: Oropharynx is clear. No oropharyngeal exudate or posterior oropharyngeal erythema. Cardiovascular:      Rate and Rhythm: Normal rate and regular rhythm. Pulses: Normal pulses. Heart sounds: Normal heart sounds. No murmur heard. No friction rub. No gallop. Pulmonary:      Effort: Pulmonary effort is normal. No respiratory distress. Breath sounds: Normal breath sounds. No wheezing, rhonchi or rales. Abdominal:      General: Abdomen is flat. Bowel sounds are normal. There is no distension. Palpations: Abdomen is soft. Tenderness: There is no abdominal tenderness. There is no guarding. Musculoskeletal:         General: No swelling, tenderness or deformity. Normal range of motion. Cervical back: Normal range of motion and neck supple. No rigidity or tenderness. Skin:     General: Skin is warm and dry. Findings: No erythema, lesion or rash. Neurological:      General: No focal deficit present. Mental Status: She is alert and oriented to person, place, and time. Mental status is at baseline.       Sensory: No sensory deficit. Motor: No weakness. Gait: Gait normal.      Deep Tendon Reflexes: Reflexes normal.   Psychiatric:         Mood and Affect: Mood normal.         Behavior: Behavior normal.         Judgment: Judgment normal.          Physical exam otherwise negative    Diagnostic Testing:    Laboratory Studies:  Office Visit on 09/07/2021   Component Date Value Ref Range Status    Microalbumin,urine random 09/07/2021 1.27  MG/DL Final    No reference range has been established.  Creatinine, urine 09/07/2021 97.50  mg/dL Final    No reference range has been established.  Microalbumin/Creat ratio (mg/g cre* 09/07/2021 13  0 - 30 mg/g Final    Vitamin D 25-Hydroxy 09/07/2021 52.8  30 - 100 ng/mL Final    Comment: (NOTE)  Deficiency               <20 ng/mL  Insufficiency          20-30 ng/mL  Sufficient             ng/mL  Possible toxicity       >100 ng/mL    The Method used is Siemens Advia Centaur currently standardized to a   Center of Disease Control and Prevention (CDC) certified reference   [facility]. Samples containing fluorescein dye can produce falsely   elevated values when tested with the ADVIA Centaur Vitamin D Assay. It is recommended that results in the toxic range, >100 ng/mL, be   retested 72 hours post fluorescein exposure.       Color 09/07/2021 YELLOW/STRAW    Final    Color Reference Range: Straw, Yellow or Dark Yellow    Appearance 09/07/2021 CLEAR  CLEAR   Final    Specific gravity 09/07/2021 1.015  1.003 - 1.030   Final    pH (UA) 09/07/2021 5.0  5.0 - 8.0   Final    Protein 09/07/2021 Negative  Negative mg/dL Final    Glucose 09/07/2021 Negative  Negative mg/dL Final    Ketone 09/07/2021 Negative  Negative mg/dL Final    Bilirubin 09/07/2021 Negative  Negative   Final    Blood 09/07/2021 Negative  Negative   Final    Urobilinogen 09/07/2021 0.2  0.2 - 1.0 EU/dL Final    Nitrites 09/07/2021 Negative  Negative   Final    Leukocyte Esterase 09/07/2021 Negative Negative   Final    TSH 09/07/2021 2.61  0.36 - 3.74 uIU/mL Final    Comment:   Due to TSH heterogeneity, both structurally and degree of glycosylation,  monoclonal antibodies used in the TSH assay may not accurately quantitate TSH. Therefore, this result should be correlated with clinical findings as well as  with other assessments of thyroid function, e.g., free T4, free T3.      T4, Free 09/07/2021 1.3  0.8 - 1.5 NG/DL Final    Hemoglobin A1c 09/07/2021 6.5* 4.0 - 5.6 % Final    Comment: NEW METHOD PLEASE NOTE NEW REFERENCE RANGE  (NOTE)  HbA1C Interpretive Ranges  <5.7              Normal  5.7 - 6.4         Consider Prediabetes  >6.5              Consider Diabetes      Est. average glucose 09/07/2021 140  mg/dL Final    Sodium 09/07/2021 140  136 - 145 mmol/L Final    Potassium 09/07/2021 4.8  3.5 - 5.1 mmol/L Final    Chloride 09/07/2021 108  97 - 108 mmol/L Final    CO2 09/07/2021 26  21 - 32 mmol/L Final    Anion gap 09/07/2021 6  5 - 15 mmol/L Final    Glucose 09/07/2021 119* 65 - 100 mg/dL Final    BUN 09/07/2021 36* 6 - 20 MG/DL Final    Creatinine 09/07/2021 1.80* 0.55 - 1.02 MG/DL Final    BUN/Creatinine ratio 09/07/2021 20  12 - 20   Final    GFR est AA 09/07/2021 32* >60 ml/min/1.73m2 Final    GFR est non-AA 09/07/2021 27* >60 ml/min/1.73m2 Final    Comment: Estimated GFR is calculated using the IDMS-traceable Modification of Diet in  Renal Disease (MDRD) Study equation, reported for both  Americans  (GFRAA) and non- Americans (GFRNA), and normalized to 1.73m2 body  surface area. The physician must decide which value applies to the patient.  Calcium 09/07/2021 9.8  8.5 - 10.1 MG/DL Final    Bilirubin, total 09/07/2021 0.8  0.2 - 1.0 MG/DL Final    ALT (SGPT) 09/07/2021 29  12 - 78 U/L Final    AST (SGOT) 09/07/2021 32  15 - 37 U/L Final    Alk.  phosphatase 09/07/2021 109  45 - 117 U/L Final    Protein, total 09/07/2021 8.1  6.4 - 8.2 g/dL Final    Albumin 09/07/2021 3.7  3.5 - 5.0 g/dL Final    Globulin 09/07/2021 4.4* 2.0 - 4.0 g/dL Final    A-G Ratio 09/07/2021 0.8* 1.1 - 2.2   Final    Cholesterol, total 09/07/2021 190  <200 MG/DL Final    Triglyceride 09/07/2021 128  <150 MG/DL Final    Comment: Based on NCEP-ATP III:  Triglycerides <150 mg/dL  is considered normal, 150-199  mg/dL  borderline high,  200-499 mg/dL high and  greater than or equal to 500  mg/dL very high.  HDL Cholesterol 09/07/2021 69  MG/DL Final    Comment: Based on NCEP ATP III, HDL Cholesterol <40 mg/dL is considered low and >60  mg/dL is elevated.  LDL, calculated 09/07/2021 95.4  0 - 100 MG/DL Final    Comment: Based on the NCEP-ATP: LDL-C concentrations are considered  optimal <100 mg/dL,  near optimal/above Normal 100-129 mg/dL Borderline High: 130-159, High: 160-189  mg/dL Very High: Greater than or equal to 190 mg/dL      VLDL, calculated 09/07/2021 25.6  MG/DL Final    CHOL/HDL Ratio 09/07/2021 2.8  0.0 - 5.0   Final    WBC 09/07/2021 10.9  3.6 - 11.0 K/uL Final    RBC 09/07/2021 4.38  3.80 - 5.20 M/uL Final    HGB 09/07/2021 13.3  11.5 - 16.0 g/dL Final    HCT 09/07/2021 42.7  35.0 - 47.0 % Final    MCV 09/07/2021 97.5  80.0 - 99.0 FL Final    MCH 09/07/2021 30.4  26.0 - 34.0 PG Final    MCHC 09/07/2021 31.1  30.0 - 36.5 g/dL Final    RDW 09/07/2021 16.3* 11.5 - 14.5 % Final    PLATELET 80/78/5111 352  150 - 400 K/uL Final    MPV 09/07/2021 13.0* 8.9 - 12.9 FL Final    NRBC 09/07/2021 0.0  0  WBC Final    ABSOLUTE NRBC 09/07/2021 0.00  0.00 - 0.01 K/uL Final    Urine culture hold 09/07/2021 Urine on hold in Microbiology dept for 2 days. If unpreserved urine is submitted, it cannot be used for addtional testing after 24 hours, recollection will be required.     Final         Radiographic Studies:  XR Results (most recent):  Results from Hospital Encounter encounter on 04/27/21    XR SPINE SNGL V (CROSS TABLE LAT)    Narrative  Clinical indication: Back pain.    Following portable view of the lower thoracic spine for spine injection. INTERPRETATION PROVIDED FOR COMPLIANCE ONLY AT NO CHARGE     CYNDEE Results (most recent):  Results from Hospital Encounter encounter on 03/12/09    MAMMO DIGITAL SCREENING    Narrative          ICD Codes / Adm. Diagnosis:    /   SCREENING  Examination:  MAMMO DIGITAL SCREENING  - [id number] - Mar 12 2009  9:41AM    Accession No:  [id number]  Reason:  SCREENING      REPORT:  Bilateral digital mammography with CAD overread was performed. Bilateral  mammography shows unremarkable breast architecture. There is no  dominant mass, suspicious calcification, skin thickening or retraction. No  significant change has occurred since prior study. Routine follow-up is  advised unless there is clinical indication for earlier exam.      IMPRESSION:  1. BI-RADS CODE: 1, NEGATIVE  2. NEGATIVE. 3.  STABLE WITHOUT MAMMOGRAPHIC EVIDENCE OF MALIGNANCY    ACR STANDARDS FOR SCREENING MAMMOGRAPHY  Mammography to be done yearly after age 36. Patient will be informed of findings by mail within 30 days. Interpreting/Reading Doctor: Shemar Webb ([id number])  Transcribed: n/a on 03/12/2009  Approved: Shemar Webb ([id number])  03/12/2009        Distribution:  Attending Doctor: Case Farias Doctor: Yenifer Peterson Results (most recent):  Results from [place] encounter on 09/20/20    CT ABD PELV WO CONT    Narrative  EXAM: CT ABD PELV WO CONT    INDICATION: R low back pain    COMPARISON: September 2020    CONTRAST:  None. TECHNIQUE:  Thin axial images were obtained through the abdomen and pelvis. Coronal and  sagittal reformats were generated. Oral contrast was not administered. CT dose  reduction was achieved through use of a standardized protocol tailored for this  examination and automatic exposure control for dose modulation.     The absence of intravenous contrast material reduces the sensitivity for  evaluation of the vasculature and solid organs. FINDINGS:  LOWER THORAX: Calcified pericardium. LIVER: Nodular liver. BILIARY TREE: Cholelithiasis. CBD is not dilated. SPLEEN: within normal limits. PANCREAS: No focal abnormality. ADRENALS: Unremarkable. KIDNEYS/URETERS: No calculus or hydronephrosis. STOMACH: Unremarkable. SMALL BOWEL: No dilatation or wall thickening. COLON: Diverticulosis. APPENDIX: Normal  PERITONEUM: No ascites or pneumoperitoneum. RETROPERITONEUM: Vascular calcifications. REPRODUCTIVE ORGANS: Normal.  URINARY BLADDER: No mass or calculus. BONES: Multilevel degenerative changes. T11 compression fracture. ABDOMINAL WALL: No mass or hernia. ADDITIONAL COMMENTS: N/A    Impression  IMPRESSION:    1. Acute T11 compression fracture. 2. Nodular liver suggesting cirrhosis. 3. Cholelithiasis. DEXA Results (most recent):  Results from Hospital Encounter encounter on 10/27/20    DEXA BONE DENSITY STUDY AXIAL    Narrative  Bone Mineral Density    Indication: Postmenopausal  Age: 81  Sex: Female. Menopause status: Postmenopausal.  Hormone replacement therapy: No    Number of falls in the past year: None. Risk factors for osteoporosis: History of low trauma fractures    Current medication for osteoporosis: None. Comparison: 12/6/2010 performed on a A V.E.T.S.c.a.r.e. unit    Technique: Imaging was performed on the Portable Scores. World Health Organization  meta-analysis fracture risk calculator (FRAX) analysis was performed for 10 year  fracture risk probability assessment    Excluded sites:  The lumbar spine due to multilevel spondylosis    Findings:    Fractures identified on Lateral scanogram: T11, T12    Femoral Neck Left:  Bone mineral density (gm/cm2): 0.879  % of peak bone mass: 85  % for age matched controls: 120  T-score: -1.1  Z-score: 1.1    Femoral Neck Right:  Bone mineral density (gm/cm2): 0.933  % of peak bone mass: 90  % for age matched controls:  128  T-score: -0.8  Z-score: 1.5    Total Hip Left:  Bone mineral density (gm/cm2): 0.922  % of peak bone mass: 92  % for age matched controls: 124  T-score: -0.7  Z-score: 1.4    Total Hip Right:  Bone mineral density (gm/cm2): 0.852  % of peak bone mass: 85  % for age matched controls:  114  T-score: -1.2  Z-score: 0.9      33% Radius Left:  Bone mineral density (gm/cm2): 0.872  % of peak bone mass: 99  % for age matched controls:  145  T-score: -0.1  Z-score: 3.1    Impression  Impression: This patient is osteopenic using the World Health Organization criteria  Compared to the prior study, bone mineral density measurements are not  comparable as the patient was imaged on different units. 10 year probability of major osteoporotic fracture: 17.3%  10 year probability of hip fracture: 3.6%    Recommendations:  Therapy recommendations need to be tailored to each individual patient. Using  the [facility] FRAX absolute fracture algorithm, the  [address] [place] [address] recommends beginning pharmacological therapy in  postmenopausal women and men over the age of 48 with a 8 year probability of a  hip fracture of >3% OR with the 10 year probability of a major osteoporotic  fracture of >20%. Please reconsider testing based on risk factors. Currently, Medicare will only  reimburse for a central DXA examination every two years, unless the patient is  on chronic glucocorticoid therapy. Note: Please note that reliable, valid comparisons cannot be made between  studies which have been performed on machines from different manufacturers. If  clinically warranted, a follow up study performed at this site, on the same  unit, would allow the most sensitive assessment of change in bone mineral  density. MRI Results (most recent):  Results from [place] encounter on 10/20/20    MRI [address] SPINE WO CONT    Narrative    T11 impression deformity with edema and low signal material compatible with  augmentation cement.  T12 edema and low signal material compatible with  augmentation cement. No acute findings otherwise. Preliminary report was provided by Dr. Yareli Domingo, the on-call radiologist, at  19:02    Final report to follow. EXAM: MRI THORAC SPINE WO CONT    INDICATION: Wedge compression fracture    COMPARISON: None    TECHNIQUE: MR imaging of the thoracic spine was performed using the following  sequences: sagittal T1, T2, stir; axial T1, T2.    CONTRAST: None. FINDINGS:    There is increased kyphosis of the thoracic spine. There is a chronic moderate  wedge compression deformity of T6. There is mild edema in the anterior inferior  corner of T10. There is no loss of height. A fracture line is not identified. There is a severe compression deformity of T11 status post kyphoplasty. Edema  remains in the vertebral body. There is a mild compression deformity of T12  status post kyphoplasty. Edema remains in the vertebral body. Mild degenerative  edema surrounds L1-2. The course, caliber, and signal intensity of the spinal cord are normal.    The paraspinal soft tissues are within normal limits. There is epidural lipomatosis incidentally seen throughout the thoracic spine,  greatest in the midportion. There is mild/moderate spinal stenosis at C6-7. There is a minimal broad-based disc osteophyte complex at T8-9 without  significant spinal stenosis. There is slight retropulsion at T11, greater  towards the left causing mild spinal stenosis with mild impingement left lateral  recess. There is mild left neural foraminal narrowing at T10-11. Severe disc  space narrowing is present at L1-2 with a mild broad-based disc bulge causing  mild spinal stenosis. Impression  IMPRESSION:    1. Chronic moderate compression fracture at T6.  2. Mild edema in the anterior inferior corner of T10 without loss of height is  more likely degenerative than related to fracture.   3. Severe compression fracture at T11 status post kyphoplasty with continued  edema. 4. Mild compression deformity at T12 status post kyphoplasty with continued  edema. 5. Mild degenerative edema surrounding L1-2.  6. Spondylosis as above. Assessment/Plan:       ICD-10-CM ICD-9-CM    1. Routine adult health maintenance  Z00.00 V70.0    2. Ulcerative colitis without complications, unspecified location ([facility])  K51.90 556.9    3. Stage 3a chronic kidney disease (HCC)  N18.31 585.3 allopurinoL (ZYLOPRIM) 100 mg tablet      CBC WITH AUTOMATED DIFF      METABOLIC PANEL, COMPREHENSIVE      MICROALBUMIN, UR, RAND W/ MICROALB/CREAT RATIO   4. Type 2 diabetes mellitus with stage 3b chronic kidney disease, without long-term current use of insulin ([facility])  E11.22 250.40 HEMOGLOBIN A1C WITH EAG    N18.32 585.3    5. Essential hypertension  I10 401.9 carvediloL (COREG) 3.125 mg tablet      felodipine (PLENDIL SR) 5 mg 24 hr tablet   6. Mixed hyperlipidemia  E78.2 272.2 rosuvastatin (Crestor) 5 mg tablet      LIPID PANEL   7. Osteopenia of multiple sites  M85.89 733.90    8. Acquired hypothyroidism  E03.9 244.9 levothyroxine (SYNTHROID) 50 mcg tablet   9. Vitamin D deficiency  E55.9 268.9 vit C,B-Hz-zxvsy-lutein-zeaxan (PreserVision AREDS-2) 250-90-40-1 mg cap capsule      cholecalciferol (VITAMIN D3) (1000 Units /25 mcg) tablet      VITAMIN D, 25 HYDROXY   10. Allergic rhinitis, unspecified seasonality, unspecified trigger  J30.9 477.9               Follow-up and Dispositions    · Return in about 6 months (around 12/29/2022). Healthcare Maintenance:  - Preventive measures are reviewed as per above  - Up to date on routine interventions except as noted above  - Orders placed to update gaps as noted  - Notes: Discussed vaccines as noted above. Fasting labs ordered. Eye examination pending in January 2023.         Chronic Kidney Disease:   - Last Serum Creatinine:   Lab Results   Component Value Date/Time    Creatinine 1.80 (H) 09/07/2021 10:20 AM       - Last GFR:   Lab Results Component Value Date/Time    GFR est AA 32 (L) 09/07/2021 10:20 AM    GFR est non-AA 27 (L) 09/07/2021 10:20 AM      - Current Stage by eGFR: G3b to G4   - Proteinuria: checking MALB today. -   Key CKD Meds             cholecalciferol (VITAMIN D3) (1000 Units /25 mcg) tablet (Taking) Take 2 Tablets by mouth daily.         - Blood Pressure Target: See Hypertension/Blood Pressure Management Section   - Advised avoidance of NSAIDs and other nephrotoxins wherever possible   - Advised renal dosing of medications where necessary   - Acid/Base Management: ---   - Phosphorus Management: Vitamin D   - Nephrology Consultation: Consider but defer for now   - Notes: ---      Essential Hypertension/Blood Pressure Management:   - Home BP Readings: not doing   - Current Control: optimal   - Target BP: <130/80 mmHg   - Relevant BP Meds:  Key CAD CHF Meds             carvediloL (COREG) 3.125 mg tablet (Taking) TAKE 1 TABLET TWICE A DAY WITH MEALS    rosuvastatin (Crestor) 5 mg tablet (Taking) Take 1 Tablet by mouth nightly for 360 days. Taking twice a week Mon and Fri    felodipine (PLENDIL SR) 5 mg 24 hr tablet (Taking) TAKE 1 TABLET DAILY    carvediloL (COREG) 3.125 mg tablet (Discontinued) TAKE 1 TABLET TWICE A DAY WITH MEALS           - Plan: continue current treatment regimen, dietary sodium restriction, regular aerobic exercise, weight loss   - Notes: ---    Acquired Hypothyroidism:   - Current Symptoms: denies fatigue, weight changes, heat/cold intolerance, bowel/skin changes or CVS symptoms   - TSH Target: Biochemically euthyroid. - Last TSH: reviewed as above in Labs   - Plan: current treatment plan effective, no change in therapy  orders as documented in EMR  lab results and schedule of future related lab tests reviewed with patient   - Notes: Checking TSH today.        Hyperlipidemia/Dyslipidemia:   - Summary of Cardiovascular Risks and Goals:     LDL goal is under 100  diabetic   -   Lab Results   Component Value Date/Time    LDL, calculated 95.4 09/07/2021 10:20 AM    HDL Cholesterol 69 09/07/2021 10:20 AM       - Relevant Cholesterol Meds:  Key Antihyperlipidemia Meds             rosuvastatin (Crestor) 5 mg tablet (Taking) Take 1 Tablet by mouth nightly for 360 days. Taking twice a week Mon and Fri            - Cholesterol at target: yes   - Does patient meet USPSTF and ACC/AHA indications for pharmacotherapy (e.g., statin): yes   - GI symptoms with meds: NO   - Muscle aches with meds: NO   - Other Adverse effects with meds: NO   - Medication Plan: continue   - Notes: ---    Osteoporosis/Osteopenia:   - DEXA HX: prior DEXA as per above with high FRAX risk. - Advised Calcium and Vitamin D supplementation   - Treatment Indicated based on Osteoporosis or FRAX Score: YES    - Bisphosphonate: NO    - Denosumab: NO    - Teriparitide: NO     - Other: NO   - Notes: I discussed this diagnosis with the patient; noted to her that she would typically benefit from antiresorptive therapy, probably with Prolia given her CKD. She would like to consider this but will advise when she is ready to begin therapy at which point I can place a referral for her to the infusion center. Ulcerative colitis:  - Symptomatically quiescent. No evidence of ongoing symptoms. Not being followed by gastroenterology but also not taking any medications for this at this time. Continue to monitor clinically. Allergic rhinitis:  - I suspect that the patient's complaint of dry mouth is likely related to her ongoing use of antihistamines. We discussed discontinuing these and starting Flonase and nasal saline rinses instead. Discussed anticholinergic side effects. Patient endorses agreement and understanding. Toshia Taylor MD    Please note that this dictation was completed with "[email or web address] the [employer] voice recognition software.   Quite often unanticipated grammatical, syntax, homophones, and other interpretive errors are inadvertently transcribed by the computer software. Please disregard these errors. Please excuse any errors that have escaped final proofreading.      This was a complex patient and total appointment time was at least 40 minutes, to include:  - review of medical record  - history gathering, physical examination, and review of systems  - medication reconciliation  - medical decision-making  - counseling on the plan of care

## 2022-06-27 NOTE — TELEPHONE ENCOUNTER
PCP: Cameron Julian MD    Last appt: 9/7/2021  Future Appointments   Date Time Provider Lissa Rodriguez   6/29/2022  2:30 PM Cecile Johnson MD [facility]       Requested Prescriptions     Pending Prescriptions Disp Refills    carvediloL (COREG) 3.125 mg tablet 180 Tablet 4     Sig: TAKE 1 TABLET TWICE A DAY WITH MEALS       Prior labs and Blood pressures:  BP Readings from Last 3 Encounters:   09/07/21 110/60   05/27/21 122/60   04/27/21 138/60     Lab Results   Component Value Date/Time    Sodium 140 09/07/2021 10:20 AM    Potassium 4.8 09/07/2021 10:20 AM    Chloride 108 09/07/2021 10:20 AM    CO2 26 09/07/2021 10:20 AM    Anion gap 6 09/07/2021 10:20 AM    Glucose 119 (H) 09/07/2021 10:20 AM    BUN 36 (H) 09/07/2021 10:20 AM    Creatinine 1.80 (H) 09/07/2021 10:20 AM    BUN/Creatinine ratio 20 09/07/2021 10:20 AM    GFR est AA 32 (L) 09/07/2021 10:20 AM    GFR est non-AA 27 (L) 09/07/2021 10:20 AM    Calcium 9.8 09/07/2021 10:20 AM

## 2022-06-29 ENCOUNTER — OFFICE VISIT (OUTPATIENT)
Dept: INTERNAL MEDICINE CLINIC | Age: 87
End: 2022-06-29
Payer: MEDICARE

## 2022-06-29 VITALS
BODY MASS INDEX: 24.12 KG/M2 | OXYGEN SATURATION: 93 % | WEIGHT: 141.3 LBS | HEART RATE: 79 BPM | RESPIRATION RATE: 18 BRPM | TEMPERATURE: 98.3 F | DIASTOLIC BLOOD PRESSURE: 71 MMHG | SYSTOLIC BLOOD PRESSURE: 122 MMHG | HEIGHT: 64 IN

## 2022-06-29 DIAGNOSIS — N18.31 STAGE 3A CHRONIC KIDNEY DISEASE (HCC): ICD-10-CM

## 2022-06-29 DIAGNOSIS — E78.2 MIXED HYPERLIPIDEMIA: ICD-10-CM

## 2022-06-29 DIAGNOSIS — E11.22 TYPE 2 DIABETES MELLITUS WITH STAGE 3B CHRONIC KIDNEY DISEASE, WITHOUT LONG-TERM CURRENT USE OF INSULIN (HCC): ICD-10-CM

## 2022-06-29 DIAGNOSIS — N18.32 TYPE 2 DIABETES MELLITUS WITH STAGE 3B CHRONIC KIDNEY DISEASE, WITHOUT LONG-TERM CURRENT USE OF INSULIN (HCC): ICD-10-CM

## 2022-06-29 DIAGNOSIS — M85.89 OSTEOPENIA OF MULTIPLE SITES: ICD-10-CM

## 2022-06-29 DIAGNOSIS — K51.90 ULCERATIVE COLITIS WITHOUT COMPLICATIONS, UNSPECIFIED LOCATION (HCC): ICD-10-CM

## 2022-06-29 DIAGNOSIS — E03.9 ACQUIRED HYPOTHYROIDISM: ICD-10-CM

## 2022-06-29 DIAGNOSIS — I10 ESSENTIAL HYPERTENSION: ICD-10-CM

## 2022-06-29 DIAGNOSIS — Z00.00 ROUTINE ADULT HEALTH MAINTENANCE: Primary | ICD-10-CM

## 2022-06-29 DIAGNOSIS — J30.9 ALLERGIC RHINITIS, UNSPECIFIED SEASONALITY, UNSPECIFIED TRIGGER: ICD-10-CM

## 2022-06-29 DIAGNOSIS — E55.9 VITAMIN D DEFICIENCY: ICD-10-CM

## 2022-06-29 PROCEDURE — 99397 PER PM REEVAL EST PAT 65+ YR: CPT | Performed by: INTERNAL MEDICINE

## 2022-06-29 PROCEDURE — 99215 OFFICE O/P EST HI 40 MIN: CPT | Performed by: INTERNAL MEDICINE

## 2022-06-29 RX ORDER — ALLOPURINOL 100 MG/1
TABLET ORAL
Qty: 90 TABLET | Refills: 3 | Status: SHIPPED | OUTPATIENT
Start: 2022-06-29

## 2022-06-29 RX ORDER — CARVEDILOL 3.12 MG/1
TABLET ORAL
Qty: 180 TABLET | Refills: 4 | Status: SHIPPED | OUTPATIENT
Start: 2022-06-29

## 2022-06-29 RX ORDER — LEVOTHYROXINE SODIUM 50 UG/1
TABLET ORAL
Qty: 90 TABLET | Refills: 3 | Status: SHIPPED | OUTPATIENT
Start: 2022-06-29

## 2022-06-29 RX ORDER — MELATONIN
2000 DAILY
Qty: 60 TABLET | Refills: 0 | Status: SHIPPED | OUTPATIENT
Start: 2022-06-29

## 2022-06-29 RX ORDER — FELODIPINE 5 MG/1
TABLET, EXTENDED RELEASE ORAL
Qty: 90 TABLET | Refills: 4 | Status: SHIPPED | OUTPATIENT
Start: 2022-06-29

## 2022-06-29 RX ORDER — VIT C/E/ZN/COPPR/LUTEIN/ZEAXAN 250MG-90MG
CAPSULE ORAL
COMMUNITY

## 2022-06-29 RX ORDER — ROSUVASTATIN CALCIUM 5 MG/1
5 TABLET, COATED ORAL
Qty: 90 TABLET | Refills: 3 | Status: SHIPPED | OUTPATIENT
Start: 2022-06-29 | End: 2023-06-24

## 2022-06-29 NOTE — PATIENT INSTRUCTIONS
Please continue monitoring your blood pressure at home using your home blood pressure monitor. Please record your readings and bring these with you to your next visit. DASH Diet: Care Instructions  Your Care Instructions     The DASH diet is an eating plan that can help lower your blood pressure. DASH stands for Dietary Approaches to Stop Hypertension. Hypertension is high blood pressure. The DASH diet focuses on eating foods that are high in calcium, potassium, and magnesium. These nutrients can lower blood pressure. The foods that are highest in these nutrients are fruits, vegetables, low-fat dairy products, nuts, seeds, and legumes. But taking calcium, potassium, and magnesium supplements instead of eating foods that are high in those nutrients does not have the same effect. The DASH diet also includes whole grains, fish, and poultry. The DASH diet is one of several lifestyle changes your doctor may recommend to lower your high blood pressure. Your doctor may also want you to decrease the amount of sodium in your diet. Lowering sodium while following the DASH diet can lower blood pressure even further than just the DASH diet alone. Follow-up care is a key part of your treatment and safety. Be sure to make and go to all appointments, and call your doctor if you are having problems. It's also a good idea to know your test results and keep a list of the medicines you take. How can you care for yourself at home? Following the DASH diet  · Eat 4 to 5 servings of fruit each day. A serving is 1 medium-sized piece of fruit, ½ cup chopped or canned fruit, 1/4 cup dried fruit, or 4 ounces (½ cup) of fruit juice. Choose fruit more often than fruit juice. · Eat 4 to 5 servings of vegetables each day. A serving is 1 cup of lettuce or raw leafy vegetables, ½ cup of chopped or cooked vegetables, or 4 ounces (½ cup) of vegetable juice. Choose vegetables more often than vegetable juice.   · Get 2 to 3 servings of low-fat and fat-free dairy each day. A serving is 8 ounces of milk, 1 cup of yogurt, or 1 ½ ounces of cheese. · Eat 6 to 8 servings of grains each day. A serving is 1 slice of bread, 1 ounce of dry cereal, or ½ cup of cooked rice, pasta, or cooked cereal. Try to choose whole-grain products as much as possible. · Limit lean meat, poultry, and fish to 2 servings each day. A serving is 3 ounces, about the size of a deck of cards. · Eat 4 to 5 servings of nuts, seeds, and legumes (cooked dried beans, lentils, and split peas) each week. A serving is 1/3 cup of nuts, 2 tablespoons of seeds, or ½ cup of cooked beans or peas. · Limit fats and oils to 2 to 3 servings each day. A serving is 1 teaspoon of vegetable oil or 2 tablespoons of salad dressing. · Limit sweets and added sugars to 5 servings or less a week. A serving is 1 tablespoon jelly or jam, ½ cup sorbet, or 1 cup of lemonade. · Eat less than 2,300 milligrams (mg) of sodium a day. If you limit your sodium to 1,500 mg a day, you can lower your blood pressure even more. · Be aware that all of these are the suggested number of servings for people who eat 1,800 to 2,000 calories a day. Your recommended number of servings may be different if you need more or fewer calories. Tips for success  · Start small. Do not try to make dramatic changes to your diet all at once. You might feel that you are missing out on your favorite foods and then be more likely to not follow the plan. Make small changes, and stick with them. Once those changes become habit, add a few more changes. · Try some of the following:  ? Make it a goal to eat a fruit or vegetable at every meal and at snacks. This will make it easy to get the recommended amount of fruits and vegetables each day. ? Try yogurt topped with fruit and nuts for a snack or healthy dessert. ? Add lettuce, tomato, cucumber, and onion to sandwiches.   ? Combine a ready-made pizza crust with low-fat mozzarella cheese and lots of vegetable toppings. Try using tomatoes, squash, spinach, broccoli, carrots, cauliflower, and onions. ? Have a variety of cut-up vegetables with a low-fat dip as an appetizer instead of chips and dip. ? Sprinkle sunflower seeds or chopped almonds over salads. Or try adding chopped walnuts or almonds to cooked vegetables. ? Try some vegetarian meals using beans and peas. Add garbanzo or kidney beans to salads. Make burritos and tacos with mashed goldsmith beans or black beans. Where can you learn more? Go to http://www.montilla.com/  Enter H967 in the search box to learn more about \"DASH Diet: Care Instructions. \"  Current as of: January 10, 2022               Content Version: 13.2  © 2453-1677 [employer]. Care instructions adapted under license by [employer] (which disclaims liability or warranty for this information). If you have questions about a medical condition or this instruction, always ask your healthcare professional. [place] [address] any warranty or liability for your use of this information. Chronic Kidney Disease: Care Instructions  Overview     Chronic kidney disease happens when your kidneys don't work as well as they should. Your kidneys have a few important jobs. They remove waste from your blood. This waste leaves your body in your urine. They also balance your body's fluids and chemicals. When your kidneys don't work well, extra waste and fluid can build up. This can poison the body and sometimes cause death. The most common causes of this disease are diabetes and high blood pressure. In some cases, the disease develops in 2 to 3 months. But it usually develops over many years. If you take medicine and make healthy changes to your lifestyle, you may be able to prevent the disease from getting worse. But if your kidney damage gets worse, you may need dialysis or a kidney transplant.  Dialysis uses a machine to filter waste from the blood. A transplant is surgery to give you a healthy kidney from another person. Follow-up care is a key part of your treatment and safety. Be sure to make and go to all appointments, and call your doctor if you are having problems. It's also a good idea to know your test results and keep a list of the medicines you take. How can you care for yourself at home? Treatments and appointments    · Be safe with medicines. Take your medicines exactly as prescribed. Call your doctor if you have any problems with your medicine. You also may take medicine to control your blood pressure or to treat diabetes. Many people who have diabetes take blood pressure medicine.     · If you have diabetes, do your best to keep your blood sugar in your target range. You may do this by eating healthy food and exercising. You may also take medicines.     · Go to your dialysis appointments if you have this treatment.     · Do not take ibuprofen, naproxen, or similar medicines, unless your doctor tells you to. These may make the disease worse.     · Do not take any vitamins, over-the-counter medicines, or herbal products without talking to your doctor first.     · Do not smoke or use other tobacco products. Smoking can reduce blood flow to the kidneys. If you need help quitting, talk to your doctor about stop-smoking programs and medicines. These can increase your chances of quitting for good.     · Limit your use of alcohol and avoid illegal drugs.     · Talk to your doctor about an exercise plan. Exercise helps lower your blood pressure. It also makes you feel better.     · If you have an advance directive, let your doctor know. It may include a living will and a durable power of  for health care. If you don't have one, you may want to prepare one. It lets your doctor and loved ones know your health care wishes if you become unable to speak for yourself. Diet    · Talk to a registered dietitian.  They can help you make a meal plan that is right for you. Most people with kidney disease need to limit salt (sodium), fluids, and protein. Some also have to limit potassium and phosphorus.     · You may have to give up many foods you like. But try to focus on the fact that this will help you stay healthy for as long as possible.     · If you have a hard time eating enough, talk to your doctor or dietitian about ways to add calories to your diet.     · Your diet may change as your disease changes. See your doctor for regular testing. And work with a dietitian to change your diet as needed. When should you call for help? Call 911 anytime you think you may need emergency care. For example, call if:    · You passed out (lost consciousness). Call your doctor now or seek immediate medical care if:    · You have less urine than normal or no urine.     · You have trouble urinating or can urinate only very small amounts.     · You are confused or have trouble thinking clearly.     · You feel weaker or more tired than usual.     · You are very thirsty, lightheaded, or dizzy.     · You have nausea and vomiting.     · You have new swelling of your arms or feet, or your swelling is worse.     · You have blood in your urine.     · You have new or worse trouble breathing. Watch closely for changes in your health, and be sure to contact your doctor if:    · You have any problems with your medicine or other treatment. Where can you learn more? Go to http://www.gray.com/  Enter N276 in the search box to learn more about \"Chronic Kidney Disease: Care Instructions. \"  Current as of: September 8, 2021               Content Version: 13.2  © 2302-8066 [employer]. Care instructions adapted under license by [employer] (which disclaims liability or warranty for this information).  If you have questions about a medical condition or this instruction, always ask your healthcare professional. [employer], [facility] disclaims any warranty or liability for your use of this information. Medicines to Avoid With Kidney Disease: Care Instructions  Overview     Kidney disease means that your kidneys are not able to get rid of waste from the blood. So they can't keep your body's fluids and chemicals in balance. Usually, the kidneys get rid of waste from the blood through the urine. And they balance the fluids in the body. When your kidneys don't work as they should, you have to be careful about some medicines. They may harm your kidneys. Your doctor may tell you not to take them or may change the dose. Medicines for pain and swelling, such as ibuprofen (Advil or Motrin) or naproxen (Aleve), can cause harm. So can some antibiotics and antacids. And you need to be careful about some drugs that treat cancer, lower blood pressure, or get rid of water from the body. Some herbal products could cause harm too. Follow-up care is a key part of your treatment and safety. Be sure to make and go to all appointments, and call your doctor if you are having problems. It's also a good idea to know your test results and keep a list of the medicines you take. How can you care for yourself at home? · Tell your doctor all the prescription, herbal, or over-the-counter medicines you take. Do not take any new ones unless you talk to your doctor first.  · Do not take anti-inflammatory medicines. These include ibuprofen (Advil, Motrin) and naproxen (Aleve). You can use acetaminophen (Tylenol) for pain. · Do not take two or more pain medicines at the same time unless the doctor told you to. Many pain medicines have acetaminophen, which is Tylenol. Too much acetaminophen (Tylenol) can be harmful. · Tell all doctors and others who work with your health care that you have kidney disease. · Wear medical alert jewelry that lists your health problem. You can buy this at most drugstores. Where can you learn more?   Go to http://www.gray.com/  Enter F099 in the search box to learn more about \"Medicines to Avoid With Kidney Disease: Care Instructions. \"  Current as of: September 8, 2021               Content Version: 13.2  © 2006-2022 [employer]. Care instructions adapted under license by [employer] (which disclaims liability or warranty for this information). If you have questions about a medical condition or this instruction, always ask your healthcare professional. [address] any warranty or liability for your use of this information. Denosumab (By injection)   Denosumab (szp-KKB-up-mab)  Treats osteoporosis, bone cancer, hypercalcemia, and other bone problems in patients who have cancer. Brand Name(s): Prolia, Xgeva   There may be other brand names for this medicine. When This Medicine Should Not Be Used: This medicine is not right for everyone. You should not receive it if you had an allergic reaction to denosumab or if you are pregnant. How to Use This Medicine:   Injectable  · A doctor or other health professional will give you this medicine. This medicine is usually given as a shot under the skin of your upper arm, upper thigh, or stomach. · This medicine should come with a Medication Guide. Ask your pharmacist for a copy if you do not have one. · Missed dose: Call your doctor or pharmacist for instructions. Drugs and Foods to Avoid:   Ask your doctor or pharmacist before using any other medicine, including over-the-counter medicines, vitamins, and herbal products. · Do not use Prolia® and Xgeva® together. They contain the same medicine. · Some medicines can affect how denosumab works. Tell your doctor if you are also using medicine that weakens your immune system, including a steroid or cancer medicine.   Warnings While Using This Medicine:   · This medicine may cause birth defects if either partner is using it during conception or pregnancy. Tell your doctor right away if you or your partner becomes pregnant. Use an effective form of birth control. Women who are being treated with [place]  should continue using birth control for at least 5 months after the last dose. · Tell your doctor if you are breastfeeding, or if you have kidney disease, diabetes, gum disease, or an allergy to latex. Tell your doctor if you have problems with your thyroid, parathyroid, or digestive system. · This medicine may cause the following problems:  ¨ Low calcium levels in your blood  ¨ Increased risk of broken thigh bone  ¨ Increased risk of infections  ¨ Serious skin reactions  ¨ Severe bone, joint, or muscle pain  · This medicine can cause jaw problems. You must have regular dental exams while you are being treated with this medicine. Tell your dentist that you are using this medicine. Practice good oral hygiene. · Do not suddenly stop using Prolia® without checking first with your doctor. Doing so may increase risk for more fractures. Talk to your doctor about other medicine that you can take. · Your doctor will do lab tests at regular visits to check on the effects of this medicine. Keep all appointments.   Possible Side Effects While Using This Medicine:   Call your doctor right away if you notice any of these side effects:  · Allergic reaction: Itching or hives, swelling in your face or hands, swelling or tingling in your mouth or throat, chest tightness, trouble breathing  · Blistering, peeling, red skin rash  · Chest pain, fast or uneven heartbeat, trouble breathing  · Fever, chills, cough, sore throat, body aches  · Lightheadedness, dizziness, fainting  · Muscle spasms or twitching, numbness or tingling in your fingers, toes, or lips  · Pain or burning during urination, change in how much or how often you urinate  · Pain, swelling, heavy feeling, or numbness in your mouth or jaw, loose teeth or other teeth problems  · Severe bone, joint, or muscle pain  · Unusual pain in your thigh, groin, or hip  If you notice these less serious side effects, talk with your doctor:   · Diarrhea, nausea  · Redness, pain, itching, burning, swelling, or a lump under your skin where the shot was given  · Tiredness or weakness  If you notice other side effects that you think are caused by this medicine, tell your doctor. Call your doctor for medical advice about side effects. You may report side effects to FDA at 1-823-MGB-7656  © 2017 [facility] Information is for End User's use only and may not be sold, redistributed or otherwise used for commercial purposes. The above information is an  only. It is not intended as medical advice for individual conditions or treatments. Talk to your doctor, nurse or pharmacist before following any medical regimen to see if it is safe and effective for you. Fluticasone (Into the nose)   Fluticasone (rcnp-JWC-s-sone)  Treats allergy symptoms, such as runny or stuffy nose. This medicine is a corticosteroid. Brand Name(s): Children's Flonase, ClariSpray, DermacinRx Teracent, DermacinRx Check, [address] [place] [address], Flonase Sensimist, Fluticasone Propionate Novaplus, Ticaspray, Veramyst   There may be other brand names for this medicine. When This Medicine Should Not Be Used: This medicine is not right for everyone. Do not use if you had an allergic reaction to fluticasone. How to Use This Medicine:   Spray  · Your doctor will tell you how much medicine to use. Do not use more than directed. · This medicine is for use only in the nose. Do not get any of it in your eyes or on your skin. If it does get on these areas, rinse it off right away. · Prime the spray: Release 6 test sprays into the air away from the face, or pump the bottle until some of the medicine sprays out. Now it is ready to use.  Prime the spray if it has not been used for more than 7 days (or 30 days for [facility]) or if the cap has been left off the bottle for 5 days or longer. · Shake the medicine well just before each use. · Before using the medicine, gently blow your nose to clear the nostrils. · After using the nasal spray, wipe the tip of the bottle with a clean tissue and put the cap back on. · You may need to use this medicine for a few days before you start to feel better. · Read and follow the patient instructions that come with this medicine. Talk to your doctor or pharmacist if you have any questions. · Follow the instructions on the medicine label if you are using this medicine without a prescription. · Missed dose: Take a dose as soon as you remember. If it is almost time for your next dose, wait until then and take a regular dose. Do not take extra medicine to make up for a missed dose. · Keep the bottle tightly closed when not using it. Store at room temperature, away from heat and direct light. Do not freeze or refrigerate. Throw this medicine away after you use 120 sprays. Drugs and Foods to Avoid:   Ask your doctor or pharmacist before using any other medicine, including over-the-counter medicines, vitamins, and herbal products. · Do not use this medicine together with ritonavir. · Some foods and medicines can affect how fluticasone works. Tell your doctor if you are using ketoconazole. Warnings While Using This Medicine:   · Tell your doctor if you are pregnant or breastfeeding, or if you have liver disease, asthma, an infection, or a history of cataracts or glaucoma. Make sure your doctor knows if you have had nose surgery, a nose injury, or a recent infection in your nose. · This medicine may cause the following problems:  ¨ Holes or ulcers inside the nose  ¨ Slow wound healing  ¨ Cataracts or glaucoma  ¨ Problems with the adrenal glands  ¨ Slow growth in children  · Avoid people who are sick or have infections. Tell your doctor right away if you think you have been exposed to measles or chickenpox.   · Your doctor will check your progress and the effects of this medicine at regular visits. Keep all appointments. · Call your doctor if your symptoms do not improve or if they get worse. · Keep all medicine out of the reach of children. Never share your medicine with anyone. Possible Side Effects While Using This Medicine:   Call your doctor right away if you notice any of these side effects:  · Allergic reaction: Itching or hives, swelling in your face or hands, swelling or tingling in your mouth or throat, chest tightness, trouble breathing  · Burning, redness, swelling, or irritation around or inside your nose  · Eye pain or vision changes  · Fever, chills, cough, sore throat, and body aches  · Heavy nosebleeds  · Sores or white patches inside the nose or mouth  · Tiredness, weakness, dizziness  If you notice other side effects that you think are caused by this medicine, tell your doctor. Call your doctor for medical advice about side effects. You may report side effects to FDA at 7-668-FDA-3149  © 2017 [facility] Information is for End User's use only and may not be sold, redistributed or otherwise used for commercial purposes. The above information is an  only. It is not intended as medical advice for individual conditions or treatments. Talk to your doctor, nurse or pharmacist before following any medical regimen to see if it is safe and effective for you. Saline Nasal Washes: Care Instructions  Overview     Saline nasal washes help keep the nasal passages open by washing out thick or dried mucus. This simple remedy can help relieve symptoms of allergies, sinusitis, and colds. It also can make the nose feel more comfortable by keeping the mucous membranes moist. You may notice a little burning sensation in your nose the first few times you use the solution, but this usually gets better in a few days. Follow-up care is a key part of your treatment and safety.  Be sure to make and go to all appointments, and call your doctor if you are having problems. It's also a good idea to know your test results and keep a list of the medicines you take. How can you care for yourself at home? · You can buy premixed saline solution in a squeeze bottle or other sinus rinse products at a drugstore. Read and follow the instructions on the label. · You also can make your own saline solution by adding 1 teaspoon of non-iodized salt and 1 teaspoon of baking soda to 2 cups of distilled or boiled and cooled water. · If you use a homemade solution, use a squeeze bottle or neti pot to get the solution into your nose. Room temperature or slightly warmed water may be more comfortable. Make sure it isn't hot. · Stand over the sink with your head tilted forward and slightly to one side. Put only the tip of the syringe or squeeze bottle into the nostril that is farther away from the sink. (The nostril closest to the sink will drain the fluid.) Gently squirt the solution into the nostril and toward the back of your head with your mouth open. The solution should flow out the other nostril. Repeat on the other side. Some sneezing and gagging are normal at first.  · Gently blow your nose. · Clean the syringe or bottle after each use. · Repeat this 2 or 3 times a day. · Use nasal washes gently if you have nosebleeds often. When should you call for help? Watch closely for changes in your health, and be sure to contact your doctor if:    · Your symptoms do not get better.     · You have problems doing the nasal washes. Where can you learn more? Go to http://www.gray.com/  Enter B784 in the search box to learn more about \"Saline Nasal Washes: Care Instructions. \"  Current as of: September 8, 2021               Content Version: 13.2  © 0830-8177 [employer].    Care instructions adapted under license by [employer] (which disclaims liability or warranty for this information). If you have questions about a medical condition or this instruction, always ask your healthcare professional. [place] [address] any warranty or liability for your use of this information.

## 2022-06-29 NOTE — PROGRESS NOTES
Chief Complaint   Patient presents with   [facility]       1. \"Have you been to the ER, urgent care clinic since your last visit? Hospitalized since your last visit? \" No    2. \"Have you seen or consulted any other health care providers outside of the [address] [place] [address] since your last visit? \" No     3. For patients aged 39-70: Has the patient had a colonoscopy / FIT/ Cologuard? No      If the patient is female:    4. For patients aged 41-77: Has the patient had a mammogram within the past 2 years? No      5. For patients aged 21-65: Has the patient had a pap smear?  No

## 2022-07-01 ENCOUNTER — APPOINTMENT (OUTPATIENT)
Dept: INTERNAL MEDICINE CLINIC | Age: 87
End: 2022-07-01

## 2022-07-01 DIAGNOSIS — E11.22 TYPE 2 DIABETES MELLITUS WITH STAGE 3B CHRONIC KIDNEY DISEASE, WITHOUT LONG-TERM CURRENT USE OF INSULIN (HCC): ICD-10-CM

## 2022-07-01 DIAGNOSIS — E55.9 VITAMIN D DEFICIENCY: ICD-10-CM

## 2022-07-01 DIAGNOSIS — N18.32 TYPE 2 DIABETES MELLITUS WITH STAGE 3B CHRONIC KIDNEY DISEASE, WITHOUT LONG-TERM CURRENT USE OF INSULIN (HCC): ICD-10-CM

## 2022-07-01 DIAGNOSIS — N18.31 STAGE 3A CHRONIC KIDNEY DISEASE (HCC): ICD-10-CM

## 2022-07-01 DIAGNOSIS — E78.2 MIXED HYPERLIPIDEMIA: ICD-10-CM

## 2022-07-01 LAB
CREAT UR-MCNC: 116 MG/DL
MICROALBUMIN UR-MCNC: 2.23 MG/DL
MICROALBUMIN/CREAT UR-RTO: 19 MG/G (ref 0–30)

## 2022-07-02 LAB
25(OH)D3 SERPL-MCNC: 50 NG/ML (ref 30–100)
ALBUMIN SERPL-MCNC: 3.8 G/DL (ref 3.5–5)
ALBUMIN/GLOB SERPL: 1 {RATIO} (ref 1.1–2.2)
ALP SERPL-CCNC: 122 U/L (ref 45–117)
ALT SERPL-CCNC: 33 U/L (ref 12–78)
ANION GAP SERPL CALC-SCNC: 9 MMOL/L (ref 5–15)
AST SERPL-CCNC: 33 U/L (ref 15–37)
BASOPHILS # BLD: 0.1 K/UL (ref 0–0.1)
BASOPHILS NFR BLD: 1 % (ref 0–1)
BILIRUB SERPL-MCNC: 0.7 MG/DL (ref 0.2–1)
BUN SERPL-MCNC: 34 MG/DL (ref 6–20)
BUN/CREAT SERPL: 20 (ref 12–20)
CALCIUM SERPL-MCNC: 9.6 MG/DL (ref 8.5–10.1)
CHLORIDE SERPL-SCNC: 108 MMOL/L (ref 97–108)
CHOLEST SERPL-MCNC: 184 MG/DL
CO2 SERPL-SCNC: 24 MMOL/L (ref 21–32)
CREAT SERPL-MCNC: 1.67 MG/DL (ref 0.55–1.02)
DIFFERENTIAL METHOD BLD: ABNORMAL
EOSINOPHIL # BLD: 0.3 K/UL (ref 0–0.4)
EOSINOPHIL NFR BLD: 3 % (ref 0–7)
ERYTHROCYTE [DISTWIDTH] IN BLOOD BY AUTOMATED COUNT: 15.8 % (ref 11.5–14.5)
EST. AVERAGE GLUCOSE BLD GHB EST-MCNC: 128 MG/DL
GLOBULIN SER CALC-MCNC: 3.9 G/DL (ref 2–4)
GLUCOSE SERPL-MCNC: 132 MG/DL (ref 65–100)
HBA1C MFR BLD: 6.1 % (ref 4–5.6)
HCT VFR BLD AUTO: 40.7 % (ref 35–47)
HDLC SERPL-MCNC: 71 MG/DL
HDLC SERPL: 2.6 {RATIO} (ref 0–5)
HGB BLD-MCNC: 12.6 G/DL (ref 11.5–16)
IMM GRANULOCYTES # BLD AUTO: 0 K/UL (ref 0–0.04)
IMM GRANULOCYTES NFR BLD AUTO: 0 % (ref 0–0.5)
LDLC SERPL CALC-MCNC: 91.6 MG/DL (ref 0–100)
LYMPHOCYTES # BLD: 4.7 K/UL (ref 0.8–3.5)
LYMPHOCYTES NFR BLD: 51 % (ref 12–49)
MCH RBC QN AUTO: 31.8 PG (ref 26–34)
MCHC RBC AUTO-ENTMCNC: 31 G/DL (ref 30–36.5)
MCV RBC AUTO: 102.8 FL (ref 80–99)
MONOCYTES # BLD: 0.7 K/UL (ref 0–1)
MONOCYTES NFR BLD: 8 % (ref 5–13)
NEUTS SEG # BLD: 3.5 K/UL (ref 1.8–8)
NEUTS SEG NFR BLD: 37 % (ref 32–75)
NRBC # BLD: 0 K/UL (ref 0–0.01)
NRBC BLD-RTO: 0 PER 100 WBC
PLATELET # BLD AUTO: 196 K/UL (ref 150–400)
POTASSIUM SERPL-SCNC: 5.2 MMOL/L (ref 3.5–5.1)
PROT SERPL-MCNC: 7.7 G/DL (ref 6.4–8.2)
RBC # BLD AUTO: 3.96 M/UL (ref 3.8–5.2)
SODIUM SERPL-SCNC: 141 MMOL/L (ref 136–145)
TRIGL SERPL-MCNC: 107 MG/DL (ref ?–150)
VLDLC SERPL CALC-MCNC: 21.4 MG/DL
WBC # BLD AUTO: 9.2 K/UL (ref 3.6–11)

## 2022-07-02 NOTE — PROGRESS NOTES
Notify patient. Kidney function stable. Mild hyperkalemia, most likely artifact. Well-controlled blood sugar by A1c. Continue with current management.

## 2022-07-07 NOTE — PROGRESS NOTES
Patient was informed about lab results.  Patient had mention that one of her doctors told her she needs to get prolia injections

## 2022-07-29 ENCOUNTER — TELEPHONE (OUTPATIENT)
Dept: INTERNAL MEDICINE CLINIC | Age: 87
End: 2022-07-29

## 2022-07-29 DIAGNOSIS — M85.89 OSTEOPENIA OF MULTIPLE SITES: Primary | ICD-10-CM

## 2022-07-29 NOTE — TELEPHONE ENCOUNTER
Patient called and would like to move forward with getting the prolia injections.  Please send in referral.

## 2022-12-20 NOTE — PATIENT INSTRUCTIONS
Medicare Wellness Visit, Female     The best way to live healthy is to have a lifestyle where you eat a well-balanced diet, exercise regularly, limit alcohol use, and quit all forms of tobacco/nicotine, if applicable. Regular preventive services are another way to keep healthy. Preventive services (vaccines, screening tests, monitoring & exams) can help personalize your care plan, which helps you manage your own care. Screening tests can find health problems at the earliest stages, when they are easiest to treat. [facility] follows the current, evidence-based guidelines published by the [facility] ([employer]) when recommending preventive services for our patients. Because we follow these guidelines, sometimes recommendations change over time as research supports it. (For example, mammograms used to be recommended annually. Even though Medicare will still pay for an annual mammogram, the newer guidelines recommend a mammogram every two years for women of average risk). Of course, you and your doctor may decide to screen more often for some diseases, based on your risk and your co-morbidities (chronic disease you are already diagnosed with). Preventive services for you include:  - Medicare offers their members a free annual wellness visit, which is time for you and your primary care provider to discuss and plan for your preventive service needs.  Take advantage of this benefit every year!    -Over the age of 72 should receive the recommended pneumonia vaccines.    -All adults should have a flu vaccine yearly.  -All adults should have a tetanus vaccine every 10 years.   -Over the age 48 should receive the shingles vaccines.        -All adults should be screened once for Hepatitis C.  -All adults age 38-68 who are overweight should have a diabetes screening test once every three years.   -Other screening tests and preventive services for persons with diabetes include: an eye exam to screen for diabetic retinopathy, a kidney function test, a foot exam, and stricter control over your cholesterol.   -Cardiovascular screening for adults with routine risk involves an electrocardiogram (ECG) at intervals determined by your doctor.     -Colorectal cancer screenings should be done for adults age 39-70 with no increased risk factors for colorectal cancer. There are a number of acceptable methods of screening for this type of cancer. Each test has its own benefits and drawbacks. Discuss with your doctor what is most appropriate for you during your annual wellness visit. The different tests include: colonoscopy (considered the best screening method), a fecal occult blood test, a fecal DNA test, and sigmoidoscopy.    -Lung cancer screening is recommended annually with a low dose CT scan for adults between age 54 and 68, who have smoked at least 30 pack years (equivalent of 1 pack per day for 30 days), and who is a current smoker or quit less than 15 years ago.    -A bone mass density test is recommended when a woman turns 65 to screen for osteoporosis. This test is only recommended one time, as a screening. Some providers will use this same test as a disease monitoring tool if you already have osteoporosis. -Breast cancer screenings are recommended every other year for women of normal risk, age 54-69.    -Cervical cancer screenings for women over age 72 are only recommended with certain risk factors.      Here is a list of your current Health Maintenance items (your personalized list of preventive services) with a due date:  Health Maintenance Due   Topic Date Due    Shingles Vaccine (1 of 2) Never done    COVID-19 Vaccine (4 - Booster for [place] Ser series) 01/11/2022    Yearly Flu Vaccine (1) 08/01/2022    Diabetic Foot Care  09/07/2022    Annual Well Visit  09/08/2022

## 2022-12-20 NOTE — PROGRESS NOTES
ICD-10-CM ICD-9-CM    1. Medicare annual wellness visit, subsequent  Z00.00 V70.0       2. Stage 3a chronic kidney disease (HCC)  C23.69 642.5 METABOLIC PANEL, COMPREHENSIVE      3. Type 2 diabetes mellitus with stage 3b chronic kidney disease, without long-term current use of insulin (HCC)  E11.22 250.40     N18.32 585.3       4. Essential hypertension  I10 401.9       5. Mixed hyperlipidemia  E78.2 272.2       6. Acquired hypothyroidism  E03.9 244.9 TSH 3RD GENERATION      7. Mixed irritable bowel syndrome  K58.2 564.1                Subjective:     Chief Complaint   Patient presents with    Follow-up       Lorri Apley is a 81 y.o. F.  She has a past medical history of chronic kidney disease, hypothyroidism, hypercholesterolemia, and hypertension, as well as ulcerative colitis. I reviewed and updated the medical record. I saw this patient most recently in mid June for routine preventive care and establishment. At the time, she had only complained of some earwax buildup bilaterally, as well as a dry mouth. Physical examination at the time had been generally unremarkable. She was continued on her usual medications. Routine laboratory studies were ordered. Her ulcerative colitis was felt to be symptomatically quiescent. We had discussed reducing antihistamines to reduce her dry mouth. Today, the patient comes in for Medicare wellness visit as well as follow-up on her chronic medical conditions, and a concern regarding lower abdominal cramping. She says that over the past several months, she has been in her usual state of health without significant clinical changes. However, over the past several weeks she has been dealing with some lower abdominal cramping which is usually relieved with flatus or bowel movements. She describes occasional loose stools as well as occasional constipation, for which she continues on Metamucil.   She denies having had any hematochezia, and wonders if this is [facility] diverticulosis acting up\". She has not had any fevers or chills, or changes in appetite, and denies having had any weight loss. The pain is described as a cramping sensation, occasional burning, but as noted typically goes away after bowel movements or passing gas, which she says she has been doing more frequently. She is noted as above to have had a prior history of ulcerative colitis but is reluctant to go back to talk to a gastroenterologist.  She is not currently on any medications for this. Other than this, she continues on rosuvastatin 5 mg nightly for a history of hypercholesterolemia, and reports tolerating this well without muscle aches or other GI symptoms associated with the medication. She continues on felodipine 5 mg daily for a history of hypertension. No recent lightheadedness or dizziness or other cardiopulmonary or focal neurological concerns. She has been on levothyroxine 50 mcg daily, and denies having had any temperature intolerance, weight changes, or loss of energy level. She has not had her TSH checked in some time. Her weight overall has been stable. Her review of systems is otherwise negative. She otherwise lives at home with her  and is fully independent with regard to all activities and instrumental activities of daily living, without any ongoing safety or abuse concerns. Routine Healthcare Maintenance issues are reviewed and discussed with the patient as noted below. Orders to update gaps in healthcare maintenance were placed as noted below in the Assessment and Plan, where applicable.      Past Medical History:  Past Medical History:   Diagnosis Date    Acquired hypothyroidism     Allergic rhinitis     asthma allergies    Arthritis     Cirrhosis ([facility] [address] 09/2020    CT finding (nodular liver)    CKD stage G3b/A1, GFR 30-44 and albumin creatinine ratio <30 mg/g (HCC)     Diabetes mellitus (HCC)     GERD (gastroesophageal reflux disease)     Gout     Hard of hearing hard of hearing    History of blistering sunburn     teen; Sunburn, blistering [L55.1]    Hyperlipidemia     Hypertension     Osteopenia 10/2020    FRAX 17.3/3.6%    Ulcerative colitis ([address] 10/28/2014       Past Surgical Histor:  Past Surgical History:   Procedure Laterality Date    HX CATARACT REMOVAL Bilateral     HX COLONOSCOPY  9/14    ulcerative colitis    HX ORTHOPAEDIC      bilateral knee replacement       Allergies: Allergies   Allergen Reactions    Cefdinir Diarrhea    Penicillin G Rash    Statins-Hmg-Coa Reductase Inhibitors Other (comments)     arthralgia       Medications:  Current Outpatient Medications   Medication Sig Dispense Refill    vit C,K-Zo-rrhcn-lutein-zeaxan (PreserVision AREDS-2) 250-90-40-1 mg cap capsule Take  by mouth. carvediloL (COREG) 3.125 mg tablet TAKE 1 TABLET TWICE A DAY WITH MEALS 180 Tablet 4    rosuvastatin (Crestor) 5 mg tablet Take 1 Tablet by mouth nightly for 360 days. Taking twice a week Mon and Fri 90 Tablet 3    felodipine (PLENDIL SR) 5 mg 24 hr tablet TAKE 1 TABLET DAILY 90 Tablet 4    allopurinoL (ZYLOPRIM) 100 mg tablet TAKE 1 TABLET DAILY 90 Tablet 3    levothyroxine (SYNTHROID) 50 mcg tablet TAKE 1 TABLET DAILY BEFORE BREAKFAST 90 Tablet 3    cholecalciferol (VITAMIN D3) (1000 Units /25 mcg) tablet Take 2 Tablets by mouth daily. 60 Tablet 0    famotidine (PEPCID PO) Take  by mouth as needed. ACETAMINOPHEN PO Take  by mouth.          Social History:  Social History     Socioeconomic History    Marital status:    Tobacco Use    Smoking status: Never    Smokeless tobacco: Never   Vaping Use    Vaping Use: Never used   Substance and Sexual Activity    Alcohol use: No    Drug use: No    Sexual activity: Yes     Partners: Male     Birth control/protection: None       Family History:  Family History   Problem Relation Age of Onset    Diabetes Daughter     Hypertension Mother     Heart Disease Father        Immunizations:  Immunization History Administered Date(s) Administered    COVID-19, MODERNA BLUE border, Primary or Immunocompromised, (age 18y+), IM, 100 mcg/0.5mL 03/06/2021, 04/05/2021    COVID-19, MODERNA Booster BLUE border, (age 18y+), IM, 50mcg/0.25mL 11/16/2021    Influenza High Dose Vaccine PF 09/21/2015, 09/15/2016, 10/06/2017, 10/07/2019, 10/23/2022    Influenza Vaccine 09/12/2014    Influenza Vaccine (Tri) Adjuvanted (>65 Yrs FLUAD TRI [id number]) 09/04/2018    Pneumococcal Conjugate (PCV-13) 11/04/2014    Pneumococcal Polysaccharide (PPSV-23) 04/09/2013    Td, Adsorbed 07/06/2017    Zoster Vaccine, Live 11/04/2014        Healthcare Maintenance:  Health Maintenance   Topic Date Due    Shingles Vaccine (1 of 2) Never done    COVID-19 Vaccine (4 - Booster for Moderna series) 01/11/2022    Depression Screen  06/29/2023    Lipid Screen  07/01/2023    Medicare Yearly Exam  12/28/2023    DTaP/Tdap/Td series (3 - Td or Tdap) 07/06/2027    Bone Densitometry (Dexa) Screening  Completed    Flu Vaccine  Completed    Pneumococcal 65+ years  Completed        Review of Systems:  ROS:  Review of Systems   Constitutional: Negative. HENT: Negative. Eyes: Negative. Respiratory: Negative. Cardiovascular: Negative. Gastrointestinal:  Positive for abdominal pain, constipation and diarrhea. Negative for blood in stool, heartburn, melena, nausea and vomiting. Genitourinary: Negative. Musculoskeletal: Negative. Skin: Negative. Neurological: Negative. Endo/Heme/Allergies: Negative. Psychiatric/Behavioral: Negative. ROS otherwise negative      Objective:     Vital Signs:  Visit Vitals  /62 (BP 1 Location: Right arm, BP Patient Position: Sitting, BP Cuff Size: Adult)   Pulse (!) 52   Temp 98.3 °F (36.8 °C) (Oral)   Resp 18   Ht 5' 4\" (1.626 m)   Wt 145 lb 1.6 oz (65.8 kg)   LMP  (LMP Unknown)   SpO2 93%   BMI 24.91 kg/m²       BMI:  Body mass index is 24.91 kg/m².     Physical Examination:  Physical Exam  Constitutional: Appearance: Normal appearance. She is normal weight. HENT:      Head: Normocephalic and atraumatic. Right Ear: External ear normal.      Left Ear: External ear normal.      Nose: Nose normal.      Mouth/Throat:      Mouth: Mucous membranes are moist.      Pharynx: Oropharynx is clear. No oropharyngeal exudate or posterior oropharyngeal erythema. Cardiovascular:      Rate and Rhythm: Normal rate and regular rhythm. Pulses: Normal pulses. Heart sounds: Normal heart sounds. No murmur heard. No friction rub. No gallop. Pulmonary:      Effort: Pulmonary effort is normal. No respiratory distress. Breath sounds: Normal breath sounds. No wheezing, rhonchi or rales. Abdominal:      General: Abdomen is flat. Bowel sounds are normal. There is no distension. Palpations: Abdomen is soft. Tenderness: There is no abdominal tenderness. There is no guarding. Musculoskeletal:         General: No swelling, tenderness or deformity. Normal range of motion. Cervical back: Normal range of motion and neck supple. No rigidity or tenderness. Skin:     General: Skin is warm and dry. Findings: No erythema, lesion or rash. Neurological:      General: No focal deficit present. Mental Status: She is alert and oriented to person, place, and time. Mental status is at baseline. Sensory: No sensory deficit. Motor: No weakness.       Gait: Gait normal.      Deep Tendon Reflexes: Reflexes normal.   Psychiatric:         Mood and Affect: Mood normal.         Behavior: Behavior normal.         Judgment: Judgment normal.        Physical exam otherwise negative    Diagnostic Testing:    Laboratory Studies:  Appointment on 07/01/2022   Component Date Value Ref Range Status    Hemoglobin A1c 07/01/2022 6.1 (A)  4.0 - 5.6 % Final    Comment: NEW METHOD  PLEASE NOTE NEW REFERENCE RANGE  (NOTE)  HbA1C Interpretive Ranges  <5.7              Normal  5.7 - 6.4         Consider Prediabetes  >6.5 Consider Diabetes      Est. average glucose 07/01/2022 128  mg/dL Final    WBC 07/01/2022 9.2  3.6 - 11.0 K/uL Final    RBC 07/01/2022 3.96  3.80 - 5.20 M/uL Final    HGB 07/01/2022 12.6  11.5 - 16.0 g/dL Final    HCT 07/01/2022 40.7  35.0 - 47.0 % Final    MCV 07/01/2022 102.8 (A)  80.0 - 99.0 FL Final    MCH 07/01/2022 31.8  26.0 - 34.0 PG Final    MCHC 07/01/2022 31.0  30.0 - 36.5 g/dL Final    RDW 07/01/2022 15.8 (A)  11.5 - 14.5 % Final    PLATELET 57/22/5783 101  150 - 400 K/uL Final    NRBC 07/01/2022 0.0  0  WBC Final    ABSOLUTE NRBC 07/01/2022 0.00  0.00 - 0.01 K/uL Final    NEUTROPHILS 07/01/2022 37  32 - 75 % Final    LYMPHOCYTES 07/01/2022 51 (A)  12 - 49 % Final    MONOCYTES 07/01/2022 8  5 - 13 % Final    EOSINOPHILS 07/01/2022 3  0 - 7 % Final    BASOPHILS 07/01/2022 1  0 - 1 % Final    IMMATURE GRANULOCYTES 07/01/2022 0  0.0 - 0.5 % Final    ABS. NEUTROPHILS 07/01/2022 3.5  1.8 - 8.0 K/UL Final    ABS. LYMPHOCYTES 07/01/2022 4.7 (A)  0.8 - 3.5 K/UL Final    ABS. MONOCYTES 07/01/2022 0.7  0.0 - 1.0 K/UL Final    ABS. EOSINOPHILS 07/01/2022 0.3  0.0 - 0.4 K/UL Final    ABS. BASOPHILS 07/01/2022 0.1  0.0 - 0.1 K/UL Final    ABS. IMM.  GRANS. 07/01/2022 0.0  0.00 - 0.04 K/UL Final    DF 07/01/2022 AUTOMATED    Final    Sodium 07/01/2022 141  136 - 145 mmol/L Final    Potassium 07/01/2022 5.2 (A)  3.5 - 5.1 mmol/L Final    Chloride 07/01/2022 108  97 - 108 mmol/L Final    CO2 07/01/2022 24  21 - 32 mmol/L Final    Anion gap 07/01/2022 9  5 - 15 mmol/L Final    Glucose 07/01/2022 132 (A)  65 - 100 mg/dL Final    BUN 07/01/2022 34 (A)  6 - 20 MG/DL Final    Creatinine 07/01/2022 1.67 (A)  0.55 - 1.02 MG/DL Final    BUN/Creatinine ratio 07/01/2022 20  12 - 20   Final    GFR est AA 07/01/2022 35 (A)  >60 ml/min/1.73m2 Final    GFR est non-AA 07/01/2022 29 (A)  >60 ml/min/1.73m2 Final    Estimated GFR is calculated using the IDMS-traceable Modification of Diet in Renal Disease (MDRD) Study equation, reported for both  Americans (GFRAA) and non- Americans (GFRNA), and normalized to 1.73m2 body surface area. The physician must decide which value applies to the patient. Calcium 07/01/2022 9.6  8.5 - 10.1 MG/DL Final    Bilirubin, total 07/01/2022 0.7  0.2 - 1.0 MG/DL Final    ALT (SGPT) 07/01/2022 33  12 - 78 U/L Final    AST (SGOT) 07/01/2022 33  15 - 37 U/L Final    Alk. phosphatase 07/01/2022 122 (A)  45 - 117 U/L Final    Protein, total 07/01/2022 7.7  6.4 - 8.2 g/dL Final    Albumin 07/01/2022 3.8  3.5 - 5.0 g/dL Final    Globulin 07/01/2022 3.9  2.0 - 4.0 g/dL Final    A-G Ratio 07/01/2022 1.0 (A)  1.1 - 2.2   Final    Cholesterol, total 07/01/2022 184  <200 MG/DL Final    Triglyceride 07/01/2022 107  <150 MG/DL Final    Based on NCEP-ATP III:  Triglycerides <150 mg/dL  is considered normal, 150-199 mg/dL  borderline high,  200-499 mg/dL high and  greater than or equal to 500 mg/dL very high. HDL Cholesterol 07/01/2022 71  MG/DL Final    Based on NCEP ATP III, HDL Cholesterol <40 mg/dL is considered low and >60 mg/dL is elevated. LDL, calculated 07/01/2022 91.6  0 - 100 MG/DL Final    Comment: Based on the NCEP-ATP: LDL-C concentrations are considered  optimal <100 mg/dL,  near optimal/above Normal 100-129 mg/dL  Borderline High: 130-159, High: 160-189 mg/dL  Very High: Greater than or equal to 190 mg/dL      VLDL, calculated 07/01/2022 21.4  MG/DL Final    CHOL/HDL Ratio 07/01/2022 2.6  0.0 - 5.0   Final    Vitamin D 25-Hydroxy 07/01/2022 50.0  30 - 100 ng/mL Final    Comment: (NOTE)  Deficiency               <20 ng/mL  Insufficiency          20-30 ng/mL  Sufficient             ng/mL  Possible toxicity       >100 ng/mL    The Method used is Siemens Advia Centaur currently standardized to a   Center of Disease Control and Prevention (CDC) certified reference   [facility].  Samples containing fluorescein dye can produce falsely   elevated values when tested with the ADVIA Centaur Vitamin D Assay. It is recommended that results in the toxic range, >100 ng/mL, be   retested 72 hours post fluorescein exposure. Microalbumin,urine random 07/01/2022 2.23  MG/DL Final    No reference range has been established. Creatinine, urine random 07/01/2022 116.00  mg/dL Final    No reference range has been established. Microalbumin/Creat ratio (mg/g cre* 07/01/2022 19  0 - 30 mg/g Final         Radiographic Studies:  XR Results (most recent):  Results from [place] encounter on 04/27/21    XR SPINE SNGL V (CROSS TABLE LAT)    Narrative  Clinical indication: Back pain. Following portable view of the lower thoracic spine for spine injection. INTERPRETATION PROVIDED FOR COMPLIANCE ONLY AT NO CHARGE     CYNDEE Results (most recent):  Results from Hospital Encounter encounter on 03/12/09    MAMMO DIGITAL SCREENING    Narrative          ICD Codes / Adm. Diagnosis:    /   SCREENING  Examination:  MAMMO DIGITAL SCREENING  - [id number] - Mar 12 2009  9:41AM    Accession No:  [id number]  Reason:  SCREENING      REPORT:  Bilateral digital mammography with CAD overread was performed. Bilateral  mammography shows unremarkable breast architecture. There is no  dominant mass, suspicious calcification, skin thickening or retraction. No  significant change has occurred since prior study. Routine follow-up is  advised unless there is clinical indication for earlier exam.      IMPRESSION:  1. BI-RADS CODE: 1, NEGATIVE  2. NEGATIVE. 3.  STABLE WITHOUT MAMMOGRAPHIC EVIDENCE OF MALIGNANCY    ACR STANDARDS FOR SCREENING MAMMOGRAPHY  Mammography to be done yearly after age 36. Patient will be informed of findings by mail within 30 days.         Interpreting/Reading Doctor: Balbir Lock ([id number])  Transcribed: n/a on 03/12/2009  Approved: Balbir Lock ([id number])  03/12/2009        Distribution:  Attending Doctor: Karlee Lehman Doctor: Storm Dmaon Results (most recent):  Results from [place] encounter on 09/20/20    CT ABD PELV WO CONT    Narrative  EXAM: CT ABD PELV WO CONT    INDICATION: R low back pain    COMPARISON: September 2020    CONTRAST:  None. TECHNIQUE:  Thin axial images were obtained through the abdomen and pelvis. Coronal and  sagittal reformats were generated. Oral contrast was not administered. CT dose  reduction was achieved through use of a standardized protocol tailored for this  examination and automatic exposure control for dose modulation. The absence of intravenous contrast material reduces the sensitivity for  evaluation of the vasculature and solid organs. FINDINGS:  LOWER THORAX: Calcified pericardium. LIVER: Nodular liver. BILIARY TREE: Cholelithiasis. CBD is not dilated. SPLEEN: within normal limits. PANCREAS: No focal abnormality. ADRENALS: Unremarkable. KIDNEYS/URETERS: No calculus or hydronephrosis. STOMACH: Unremarkable. SMALL BOWEL: No dilatation or wall thickening. COLON: Diverticulosis. APPENDIX: Normal  PERITONEUM: No ascites or pneumoperitoneum. RETROPERITONEUM: Vascular calcifications. REPRODUCTIVE ORGANS: Normal.  URINARY BLADDER: No mass or calculus. BONES: Multilevel degenerative changes. T11 compression fracture. ABDOMINAL WALL: No mass or hernia. ADDITIONAL COMMENTS: N/A    Impression  IMPRESSION:    1. Acute T11 compression fracture. 2. Nodular liver suggesting cirrhosis. 3. Cholelithiasis. DEXA Results (most recent):  Results from Hospital Encounter encounter on 10/27/20    DEXA BONE DENSITY STUDY AXIAL    Narrative  Bone Mineral Density    Indication: Postmenopausal  Age: 81  Sex: Female. Menopause status: Postmenopausal.  Hormone replacement therapy: No    Number of falls in the past year: None. Risk factors for osteoporosis: History of low trauma fractures    Current medication for osteoporosis: None.     Comparison: 12/6/2010 performed on a [employer] unit    Technique: Imaging was performed on the [employer] ([email or web address] World Health Organization  meta-analysis fracture risk calculator (FRAX) analysis was performed for 10 year  fracture risk probability assessment    Excluded sites: The lumbar spine due to multilevel spondylosis    Findings:    Fractures identified on Lateral scanogram: T11, T12    Femoral Neck Left:  Bone mineral density (gm/cm2): 0.879  % of peak bone mass: 85  % for age matched controls: 120  T-score: -1.1  Z-score: 1.1    Femoral Neck Right:  Bone mineral density (gm/cm2): 0.933  % of peak bone mass: 90  % for age matched controls:  128  T-score: -0.8  Z-score: 1.5    Total Hip Left:  Bone mineral density (gm/cm2): 0.922  % of peak bone mass: 92  % for age matched controls: 124  T-score: -0.7  Z-score: 1.4    Total Hip Right:  Bone mineral density (gm/cm2): 0.852  % of peak bone mass: 85  % for age matched controls:  114  T-score: -1.2  Z-score: 0.9      33% Radius Left:  Bone mineral density (gm/cm2): 0.872  % of peak bone mass: 99  % for age matched controls:  145  T-score: -0.1  Z-score: 3.1    Impression  Impression: This patient is osteopenic using the World Health Organization criteria  Compared to the prior study, bone mineral density measurements are not  comparable as the patient was imaged on different units. 10 year probability of major osteoporotic fracture: 17.3%  10 year probability of hip fracture: 3.6%    Recommendations:  Therapy recommendations need to be tailored to each individual patient. Using  the [facility]) FRAX absolute fracture algorithm, the  [address] [place] [address] recommends beginning pharmacological therapy in  postmenopausal women and men over the age of 48 with a 8 year probability of a  hip fracture of >3% OR with the 10 year probability of a major osteoporotic  fracture of >20%. Please reconsider testing based on risk factors.  Currently, Medicare will only  reimburse for a central DXA examination every two years, unless the patient is  on chronic glucocorticoid therapy. Note: Please note that reliable, valid comparisons cannot be made between  studies which have been performed on machines from different manufacturers. If  clinically warranted, a follow up study performed at this site, on the same  unit, would allow the most sensitive assessment of change in bone mineral  density. MRI Results (most recent):  Results from [place] encounter on 10/20/20    MRI [facility] SPINE WO CONT    Narrative    T11 impression deformity with edema and low signal material compatible with  augmentation cement. T12 edema and low signal material compatible with  augmentation cement. No acute findings otherwise. Preliminary report was provided by Dr. Earnest Nicole, the on-call radiologist, at  19:02    Final report to follow. EXAM: MRI THORAC SPINE WO CONT    INDICATION: Wedge compression fracture    COMPARISON: None    TECHNIQUE: MR imaging of the thoracic spine was performed using the following  sequences: sagittal T1, T2, stir; axial T1, T2.    CONTRAST: None. FINDINGS:    There is increased kyphosis of the thoracic spine. There is a chronic moderate  wedge compression deformity of T6. There is mild edema in the anterior inferior  corner of T10. There is no loss of height. A fracture line is not identified. There is a severe compression deformity of T11 status post kyphoplasty. Edema  remains in the vertebral body. There is a mild compression deformity of T12  status post kyphoplasty. Edema remains in the vertebral body. Mild degenerative  edema surrounds L1-2. The course, caliber, and signal intensity of the spinal cord are normal.    The paraspinal soft tissues are within normal limits. There is epidural lipomatosis incidentally seen throughout the thoracic spine,  greatest in the midportion. There is mild/moderate spinal stenosis at C6-7.   There is a minimal broad-based disc osteophyte complex at T8-9 without  significant spinal stenosis. There is slight retropulsion at T11, greater  towards the left causing mild spinal stenosis with mild impingement left lateral  recess. There is mild left neural foraminal narrowing at T10-11. Severe disc  space narrowing is present at L1-2 with a mild broad-based disc bulge causing  mild spinal stenosis. Impression  IMPRESSION:    1. Chronic moderate compression fracture at T6.  2. Mild edema in the anterior inferior corner of T10 without loss of height is  more likely degenerative than related to fracture. 3. Severe compression fracture at T11 status post kyphoplasty with continued  edema. 4. Mild compression deformity at T12 status post kyphoplasty with continued  edema. 5. Mild degenerative edema surrounding L1-2.  6. Spondylosis as above. Assessment/Plan:       ICD-10-CM ICD-9-CM    1. Medicare annual wellness visit, subsequent  Z00.00 V70.0       2. Stage 3a chronic kidney disease ([facility])  O89.06 350.6 METABOLIC PANEL, COMPREHENSIVE      3. Type 2 diabetes mellitus with stage 3b chronic kidney disease, without long-term current use of insulin ([facility])  E11.22 250.40     N18.32 585.3       4. Essential hypertension  I10 401.9       5. Mixed hyperlipidemia  E78.2 272.2       6. Acquired hypothyroidism  E03.9 244.9 TSH 3RD GENERATION      7. Mixed irritable bowel syndrome  K58.2 564.1              Irritable bowel syndrome:  - Suspect mixed irritable bowel syndrome, given history, although differential would include uncontrolled ulcerative colitis, malignancy, infection including Clostridium difficile, or other similar conditions. No recent antibiotic use, no other alarm symptoms, and previously, ulcerative colitis had been felt to be symptomatically quiescent. However, low threshold otherwise refer back to gastroenterology should symptoms not improve with conservative management. - Advised continued use of Metamucil, but also advised trial of low FODMAP diet. Consider dicyclomine, but holding off for now. Also consider simethicone, but feel that patient would probably do better with dietary modifications alone. - Strict return precautions are provided to the patient who endorses agreement and understanding. Healthcare Maintenance:  - Preventive measures are reviewed as per above  - Up to date on routine interventions except as noted above  - Orders placed to update gaps as noted  - Notes: Repeat labs pending, otherwise up-to-date    Diabetes Mellitus:   - Type: prediabetes   - Current A1C:   Lab Results   Component Value Date/Time    Hemoglobin A1c 6.1 (H) 07/01/2022 08:35 AM    Hemoglobin A1c (POC) 6.3 (A) 02/03/2017 09:22 AM       - Target Z6A: <7%   - Complications: none   - Relevant Diabetes Medications:  Key Antihyperglycemic Medications       Patient is on no antihyperglycemic meds.               - General Recommendations discussed today: ---   - Notes: Repeating A1c with next visit, discussed dietary modifications    Chronic Kidney Disease:   - Last Serum Creatinine:   Lab Results   Component Value Date/Time    Creatinine 1.67 (H) 07/01/2022 08:35 AM       - Last GFR:   Lab Results   Component Value Date/Time    GFR est AA 35 (L) 07/01/2022 08:35 AM    GFR est non-AA 29 (L) 07/01/2022 08:35 AM      - Current Stage by eGFR: G3B   - Proteinuria: A1   -   Key CKD Meds               cholecalciferol (VITAMIN D3) (1000 Units /25 mcg) tablet (Taking) Take 2 Tablets by mouth daily.           - Blood Pressure Target: See Hypertension/Blood Pressure Management Section   - Advised avoidance of NSAIDs and other nephrotoxins wherever possible   - Advised renal dosing of medications where necessary   - Acid/Base Management: ---   - Phosphorus Management: vitamin D   - Nephrology Consultation: defer but consider; holding off given patient's advanced age   - Notes: CCM, recheck labs    Essential Hypertension/Blood Pressure Management:   - Home BP Readings: not doing   - Current Control: optimal   - Target BP: <130/80 mmHg   - Relevant BP Meds:  Key CAD CHF Meds               carvediloL (COREG) 3.125 mg tablet (Taking) TAKE 1 TABLET TWICE A DAY WITH MEALS    rosuvastatin (Crestor) 5 mg tablet (Taking) Take 1 Tablet by mouth nightly for 360 days. Taking twice a week Mon and Fri    felodipine (PLENDIL SR) 5 mg 24 hr tablet (Taking) TAKE 1 TABLET DAILY               - Plan: continue current treatment regimen, continue current meds, dietary sodium restriction   - Notes: [facility]    Hyperlipidemia/Dyslipidemia:   - Summary of Cardiovascular Risks and Goals:     LDL goal is under 100  hypertension  hyperlipidemia   -   Lab Results   Component Value Date/Time    LDL, calculated 91.6 07/01/2022 08:35 AM    HDL Cholesterol 71 07/01/2022 08:35 AM       - Relevant Cholesterol Meds:  Key Antihyperlipidemia Meds               rosuvastatin (Crestor) 5 mg tablet (Taking) Take 1 Tablet by mouth nightly for 360 days. Taking twice a week Mon and Fri              - Cholesterol at target: yes   - Does patient meet USPSTF and ACC/AHA indications for pharmacotherapy (e.g., statin): yes   - GI symptoms with meds: NO   - Muscle aches with meds: NO   - Other Adverse effects with meds: NO   - Medication Plan: continue   - Notes: [facility]    Acquired Hypothyroidism:   - Current Symptoms: denies fatigue, weight changes, heat/cold intolerance, bowel/skin changes or CVS symptoms   - TSH Target: Biochemically euthyroid.    Lab Results   Component Value Date/Time    TSH 2.61 09/07/2021 10:20 AM    TSH 1.810 08/19/2020 02:34 PM    TSH 2.250 11/06/2019 10:34 AM    TSH 2.660 03/05/2019 09:58 AM    TSH 3.310 09/04/2018 08:58 AM    TSH 2.970 03/05/2018 09:45 AM    TSH 2.920 08/24/2017 09:46 AM    TSH 2.230 02/03/2017 12:00 PM    TSH 3.050 10/04/2016 12:00 AM    TSH 2.300 06/10/2016 09:30 AM    TSH 3.100 12/10/2015 09:52 AM    TSH 2.990 06/09/2015 09:03 AM    TSH 2.170 01/28/2015 09:48 AM    TSH 3.370 10/28/2014 09:13 AM    TSH 3.320 04/08/2014 10:08 AM    TSH 4.120 12/10/2013 11:15 AM    TSH 3.540 08/06/2013 01:48 PM    TSH 2.160 06/07/2013 11:06 AM    TSH 2.030 04/09/2013 11:03 AM    TSH 3.110 12/31/2012 01:24 PM    TSH 3.470 08/27/2012 08:48 AM    TSH 2.100 06/29/2012 01:20 PM    TSH 2.460 03/30/2012 01:27 PM    TSH 2.760 12/30/2011 01:34 PM    TSH 3.380 09/30/2011 01:37 PM    TSH 3.290 02/23/2011 01:27 PM    TSH 2.590 11/24/2010 02:42 PM    TSH 1.56 08/25/2010 01:41 PM    TSH, 3rd generation 2.14 02/15/2021 11:38 AM        - Last TSH: reviewed as above in Labs   - Plan: current treatment plan effective, no change in therapy  orders as documented in EMR  lab results and schedule of future related lab tests reviewed with patient   - Notes: CCM      I have reviewed the patient's medical history in detail and updated the computerized patient record. We had a prolonged discussion about these complex clinical issues and went over the various important aspects to consider. All questions were answered. Advised the patient to call back or return to office if symptoms do not improve, change in nature, or persist.     The patient was given an after visit summary or informed of [employer] which includes patient instructions, diagnoses, current medications, & vitals. she expressed understanding with the diagnosis and plan. Darryle Ginger, MD    Please note that this dictation was completed with [employer], the computer voice recognition software. Quite often unanticipated grammatical, syntax, homophones, and other interpretive errors are inadvertently transcribed by the computer software. Please disregard these errors. Please excuse any errors that have escaped final proofreading. This is the Subsequent Medicare Annual Wellness Exam, performed 12 months or more after the Initial AWV or the last Subsequent AWV    I have reviewed the patient's medical history in detail and updated the computerized patient record.        Assessment/Plan Education and counseling provided:  Are appropriate based on today's review and evaluation    1. Medicare annual wellness visit, subsequent  2. Stage 3a chronic kidney disease (HCC)  -     METABOLIC PANEL, COMPREHENSIVE; Future  3. Type 2 diabetes mellitus with stage 3b chronic kidney disease, without long-term current use of insulin ([facility] [address]  4. Essential hypertension  5. Mixed hyperlipidemia  6. Acquired hypothyroidism  -     TSH 3RD GENERATION; Standing  7. Mixed irritable bowel syndrome       Depression Risk Factor Screening     3 most recent PHQ Screens 12/27/2022   Little interest or pleasure in doing things Not at all   Feeling down, depressed, irritable, or hopeless Not at all   Total Score PHQ 2 0   Trouble falling or staying asleep, or sleeping too much -   Feeling tired or having little energy -   Poor appetite, weight loss, or overeating -   Feeling bad about yourself - or that you are a failure or have let yourself or your family down -   Trouble concentrating on things such as school, work, reading, or watching TV -   Moving or speaking so slowly that other people could have noticed; or the opposite being so fidgety that others notice -   Thoughts of being better off dead, or hurting yourself in some way -   PHQ 9 Score -   How difficult have these problems made it for you to do your work, take care of your home and get along with others -       Alcohol & Drug Abuse Risk Screen    Do you average more than 1 drink per night or more than 7 drinks a week:  No    On any one occasion in the past three months have you have had more than 3 drinks containing alcohol:  No          Functional Ability and Level of Safety    Hearing: Hearing is good. Activities of Daily Living: The home contains: no safety equipment. Patient does total self care      Ambulation: with no difficulty     Fall Risk:  Fall Risk Assessment, last 12 mths 9/7/2021   Able to walk?  Yes   Fall in past 12 months? 0   Do you feel unsteady? 0   Are you worried about falling 0   Is TUG test greater than 12 seconds? -   Is the gait abnormal? -   Number of falls in past 12 months -   Fall with injury?  -      Abuse Screen:  Patient is not abused       Cognitive Screening    Has your family/caregiver stated any concerns about your memory: no     Cognitive Screening: Normal - Verbal Fluency Test    Health Maintenance Due     Health Maintenance Due   Topic Date Due    Shingles Vaccine (1 of 2) Never done    COVID-19 Vaccine (4 - Booster for [facility] series) 01/11/2022       Patient Care Team   Patient Care Team:  Abril Bolanos MD as PCP - General (Internal Medicine Physician)  Abril Bolanos MD as PCP - [facility] Empaneled Provider  Alka Smith MD as Consulting Provider (Endocrinology Physician)    History     Patient Active Problem List   Diagnosis Code    Hyperlipidemia E78.5    GERD (gastroesophageal reflux disease) K21.9    Hypertension I10    Allergic rhinitis J30.9    Chronic kidney disease (CKD), stage III (moderate) (HCC) N18.30    Hypothyroid E03.9    Vitamin D deficiency E55.9    Ulcerative colitis ([facility] [address] K51.90    Statin intolerance Z78.9    Glucose intolerance (impaired glucose tolerance) R73.02    Gout M10.9    CKD stage G3a/A1, GFR 45-59 and albumin creatinine ratio <30 mg/g (HCC) N18.31    Osteopenia M85.80    Diabetes mellitus ([facility] [address] E11.9     Past Medical History:   Diagnosis Date    Acquired hypothyroidism     Allergic rhinitis     asthma allergies    Arthritis     Cirrhosis ([facility] [address] 09/2020    CT finding (nodular liver)    CKD stage G3b/A1, GFR 30-44 and albumin creatinine ratio <30 mg/g (HCC)     Diabetes mellitus ([address]     GERD (gastroesophageal reflux disease)     Gout     Hard of hearing     hard of hearing    History of blistering sunburn     teen; Sunburn, blistering [L55.1]    Hyperlipidemia     Hypertension     Osteopenia 10/2020    FRAX 17.3/3.6%    Ulcerative colitis ([facility] [address] 10/28/2014      Past Surgical History:   Procedure Laterality Date    HX CATARACT REMOVAL Bilateral     HX COLONOSCOPY  9/14    ulcerative colitis    HX ORTHOPAEDIC      bilateral knee replacement     Current Outpatient Medications   Medication Sig Dispense Refill    vit C,C-Fs-xhntd-lutein-zeaxan (PreserVision AREDS-2) 250-90-40-1 mg cap capsule Take  by mouth. carvediloL (COREG) 3.125 mg tablet TAKE 1 TABLET TWICE A DAY WITH MEALS 180 Tablet 4    rosuvastatin (Crestor) 5 mg tablet Take 1 Tablet by mouth nightly for 360 days. Taking twice a week Mon and Fri 90 Tablet 3    felodipine (PLENDIL SR) 5 mg 24 hr tablet TAKE 1 TABLET DAILY 90 Tablet 4    allopurinoL (ZYLOPRIM) 100 mg tablet TAKE 1 TABLET DAILY 90 Tablet 3    levothyroxine (SYNTHROID) 50 mcg tablet TAKE 1 TABLET DAILY BEFORE BREAKFAST 90 Tablet 3    cholecalciferol (VITAMIN D3) (1000 Units /25 mcg) tablet Take 2 Tablets by mouth daily. 60 Tablet 0    famotidine (PEPCID PO) Take  by mouth as needed. ACETAMINOPHEN PO Take  by mouth.        Allergies   Allergen Reactions    Cefdinir Diarrhea    Penicillin G Rash    Statins-Hmg-Coa Reductase Inhibitors Other (comments)     arthralgia       Family History   Problem Relation Age of Onset    Diabetes Daughter     Hypertension Mother     Heart Disease Father      Social History     Tobacco Use    Smoking status: Never    Smokeless tobacco: Never   Substance Use Topics    Alcohol use: No         Brennan Cook MD

## 2022-12-27 ENCOUNTER — OFFICE VISIT (OUTPATIENT)
Dept: INTERNAL MEDICINE CLINIC | Age: 87
End: 2022-12-27
Payer: MEDICARE

## 2022-12-27 VITALS
HEART RATE: 52 BPM | BODY MASS INDEX: 24.77 KG/M2 | DIASTOLIC BLOOD PRESSURE: 62 MMHG | WEIGHT: 145.1 LBS | HEIGHT: 64 IN | RESPIRATION RATE: 18 BRPM | OXYGEN SATURATION: 93 % | TEMPERATURE: 98.3 F | SYSTOLIC BLOOD PRESSURE: 120 MMHG

## 2022-12-27 DIAGNOSIS — E11.22 TYPE 2 DIABETES MELLITUS WITH STAGE 3B CHRONIC KIDNEY DISEASE, WITHOUT LONG-TERM CURRENT USE OF INSULIN (HCC): ICD-10-CM

## 2022-12-27 DIAGNOSIS — Z00.00 MEDICARE ANNUAL WELLNESS VISIT, SUBSEQUENT: Primary | ICD-10-CM

## 2022-12-27 DIAGNOSIS — N18.31 STAGE 3A CHRONIC KIDNEY DISEASE (HCC): ICD-10-CM

## 2022-12-27 DIAGNOSIS — I10 ESSENTIAL HYPERTENSION: ICD-10-CM

## 2022-12-27 DIAGNOSIS — N18.32 TYPE 2 DIABETES MELLITUS WITH STAGE 3B CHRONIC KIDNEY DISEASE, WITHOUT LONG-TERM CURRENT USE OF INSULIN (HCC): ICD-10-CM

## 2022-12-27 DIAGNOSIS — E03.9 ACQUIRED HYPOTHYROIDISM: ICD-10-CM

## 2022-12-27 DIAGNOSIS — E78.2 MIXED HYPERLIPIDEMIA: ICD-10-CM

## 2022-12-27 DIAGNOSIS — K58.2 MIXED IRRITABLE BOWEL SYNDROME: ICD-10-CM

## 2022-12-27 PROCEDURE — 99214 OFFICE O/P EST MOD 30 MIN: CPT | Performed by: INTERNAL MEDICINE

## 2022-12-27 PROCEDURE — G0439 PPPS, SUBSEQ VISIT: HCPCS | Performed by: INTERNAL MEDICINE

## 2022-12-27 PROCEDURE — 1123F ACP DISCUSS/DSCN MKR DOCD: CPT | Performed by: INTERNAL MEDICINE

## 2022-12-27 PROCEDURE — 3044F HG A1C LEVEL LT 7.0%: CPT | Performed by: INTERNAL MEDICINE

## 2022-12-27 NOTE — PROGRESS NOTES
Chief Complaint   Patient presents with    Follow-up   Visit Vitals  /62 (BP 1 Location: Right arm, BP Patient Position: Sitting, BP Cuff Size: Adult)   Pulse (!) 52   Temp 98.3 °F (36.8 °C) (Oral)   Resp 18   Ht 5' 4\" (1.626 m)   Wt 145 lb 1.6 oz (65.8 kg)   LMP  (LMP Unknown)   SpO2 93%   BMI 24.91 kg/m²         1. \"Have you been to the ER, urgent care clinic since your last visit? Hospitalized since your last visit? \" No    2. \"Have you seen or consulted any other health care providers outside of the [address] [place] [address] since your last visit? \" No     3. For patients aged 39-70: Has the patient had a colonoscopy / FIT/ Cologuard? No      If the patient is female:    4. For patients aged 41-77: Has the patient had a mammogram within the past 2 years? No      5. For patients aged 21-65: Has the patient had a pap smear?  No

## 2022-12-28 LAB
ALBUMIN SERPL-MCNC: 3.8 G/DL (ref 3.5–5)
ALBUMIN/GLOB SERPL: 1 {RATIO} (ref 1.1–2.2)
ALP SERPL-CCNC: 114 U/L (ref 45–117)
ALT SERPL-CCNC: 54 U/L (ref 12–78)
ANION GAP SERPL CALC-SCNC: 6 MMOL/L (ref 5–15)
AST SERPL-CCNC: 44 U/L (ref 15–37)
BILIRUB SERPL-MCNC: 0.8 MG/DL (ref 0.2–1)
BUN SERPL-MCNC: 25 MG/DL (ref 6–20)
BUN/CREAT SERPL: 17 (ref 12–20)
CALCIUM SERPL-MCNC: 10 MG/DL (ref 8.5–10.1)
CHLORIDE SERPL-SCNC: 111 MMOL/L (ref 97–108)
CO2 SERPL-SCNC: 26 MMOL/L (ref 21–32)
CREAT SERPL-MCNC: 1.43 MG/DL (ref 0.55–1.02)
GLOBULIN SER CALC-MCNC: 3.9 G/DL (ref 2–4)
GLUCOSE SERPL-MCNC: 135 MG/DL (ref 65–100)
POTASSIUM SERPL-SCNC: 4.2 MMOL/L (ref 3.5–5.1)
PROT SERPL-MCNC: 7.7 G/DL (ref 6.4–8.2)
SODIUM SERPL-SCNC: 143 MMOL/L (ref 136–145)
TSH SERPL DL<=0.05 MIU/L-ACNC: 2.8 UIU/ML (ref 0.36–3.74)

## 2022-12-28 NOTE — PROGRESS NOTES
Please call the patient regarding her diagnostic evaluation.  - TSH normal  - Stable chronic kidney disease G3B  - continue current management

## 2022-12-28 NOTE — PROGRESS NOTES
Per provider instructions, contacted patient with results:- TSH normal   - Stable chronic kidney disease G3B   - continue current management. Letter sent to patient.

## 2023-07-05 ENCOUNTER — OFFICE VISIT (OUTPATIENT)
Facility: CLINIC | Age: 88
End: 2023-07-05
Payer: MEDICARE

## 2023-07-05 VITALS
DIASTOLIC BLOOD PRESSURE: 62 MMHG | HEART RATE: 73 BPM | SYSTOLIC BLOOD PRESSURE: 120 MMHG | HEIGHT: 64 IN | RESPIRATION RATE: 17 BRPM | WEIGHT: 141.8 LBS | BODY MASS INDEX: 24.21 KG/M2 | TEMPERATURE: 97.8 F | OXYGEN SATURATION: 93 %

## 2023-07-05 DIAGNOSIS — H35.3190 NONEXUDATIVE AGE-RELATED MACULAR DEGENERATION, UNSPECIFIED LATERALITY, UNSPECIFIED STAGE: Primary | ICD-10-CM

## 2023-07-05 DIAGNOSIS — I10 PRIMARY HYPERTENSION: ICD-10-CM

## 2023-07-05 DIAGNOSIS — M15.9 PRIMARY OSTEOARTHRITIS INVOLVING MULTIPLE JOINTS: ICD-10-CM

## 2023-07-05 DIAGNOSIS — E78.2 MIXED HYPERLIPIDEMIA: ICD-10-CM

## 2023-07-05 DIAGNOSIS — Z91.81 AT HIGH RISK FOR FALLS: ICD-10-CM

## 2023-07-05 DIAGNOSIS — K51.00 ULCERATIVE PANCOLITIS WITHOUT COMPLICATION (HCC): ICD-10-CM

## 2023-07-05 DIAGNOSIS — M1A.0410 IDIOPATHIC CHRONIC GOUT OF RIGHT HAND WITHOUT TOPHUS: ICD-10-CM

## 2023-07-05 DIAGNOSIS — E55.9 VITAMIN D DEFICIENCY: ICD-10-CM

## 2023-07-05 DIAGNOSIS — N18.32 TYPE 2 DIABETES MELLITUS WITH STAGE 3B CHRONIC KIDNEY DISEASE, WITHOUT LONG-TERM CURRENT USE OF INSULIN (HCC): ICD-10-CM

## 2023-07-05 DIAGNOSIS — E11.22 TYPE 2 DIABETES MELLITUS WITH STAGE 3B CHRONIC KIDNEY DISEASE, WITHOUT LONG-TERM CURRENT USE OF INSULIN (HCC): ICD-10-CM

## 2023-07-05 DIAGNOSIS — N18.31 CHRONIC KIDNEY DISEASE, STAGE 3A (HCC): ICD-10-CM

## 2023-07-05 DIAGNOSIS — E03.9 HYPOTHYROIDISM, UNSPECIFIED TYPE: ICD-10-CM

## 2023-07-05 PROBLEM — M15.0 PRIMARY OSTEOARTHRITIS INVOLVING MULTIPLE JOINTS: Status: ACTIVE | Noted: 2023-07-05

## 2023-07-05 PROCEDURE — 1123F ACP DISCUSS/DSCN MKR DOCD: CPT | Performed by: NURSE PRACTITIONER

## 2023-07-05 PROCEDURE — 99213 OFFICE O/P EST LOW 20 MIN: CPT | Performed by: NURSE PRACTITIONER

## 2023-07-05 RX ORDER — MESALAMINE 1.2 G/1
TABLET, DELAYED RELEASE ORAL
COMMUNITY
Start: 2023-06-01

## 2023-07-05 ASSESSMENT — ENCOUNTER SYMPTOMS
VOMITING: 0
ABDOMINAL PAIN: 0
DIARRHEA: 0
SINUS PAIN: 0
RECTAL PAIN: 0
TROUBLE SWALLOWING: 0
ANAL BLEEDING: 0
BLOOD IN STOOL: 0
NAUSEA: 0
PHOTOPHOBIA: 0
WHEEZING: 0
CHOKING: 0
CONSTIPATION: 0
EYE REDNESS: 0
COUGH: 0
SINUS PRESSURE: 0
FACIAL SWELLING: 0
EYE DISCHARGE: 0
SORE THROAT: 0
EYE PAIN: 0
APNEA: 0
SHORTNESS OF BREATH: 0
COLOR CHANGE: 0
BACK PAIN: 0

## 2023-07-05 ASSESSMENT — PATIENT HEALTH QUESTIONNAIRE - PHQ9
SUM OF ALL RESPONSES TO PHQ QUESTIONS 1-9: 0
SUM OF ALL RESPONSES TO PHQ QUESTIONS 1-9: 0
SUM OF ALL RESPONSES TO PHQ9 QUESTIONS 1 & 2: 0
SUM OF ALL RESPONSES TO PHQ QUESTIONS 1-9: 0
SUM OF ALL RESPONSES TO PHQ QUESTIONS 1-9: 0
2. FEELING DOWN, DEPRESSED OR HOPELESS: 0
1. LITTLE INTEREST OR PLEASURE IN DOING THINGS: 0

## 2023-07-05 NOTE — PROGRESS NOTES
Samuel Lemus (: 1935) is a 81 y.o. female here for evaluation of the following chief complaint(s):  New Patient (New to provider establish care, 6 month f/up)         SUBJECTIVE/OBJECTIVE:  HPI    Ms. Susana Salcedo here today to establish with new provider. She was a patient of Dr. Anahy Stephenson. She has no new concerns. 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 Denies CP, heart palpitations, LE edema, dyspnea. Allergies   Allergen Reactions    Cefdinir Diarrhea    Penicillin G Rash    Statins Other (See Comments)     arthralgia       Current Outpatient Medications   Medication Sig Dispense Refill    mesalamine (LIALDA) 1.2 g EC tablet       ACETAMINOPHEN PO Take by mouth      allopurinol (ZYLOPRIM) 100 MG tablet TAKE 1 TABLET DAILY      carvedilol (COREG) 3.125 MG tablet TAKE 1 TABLET TWICE A DAY WITH MEALS      vitamin D (CHOLECALCIFEROL) 25 MCG (1000 UT) TABS tablet Take by mouth daily      felodipine (PLENDIL) 5 MG extended release tablet TAKE 1 TABLET DAILY      levothyroxine (SYNTHROID) 50 MCG tablet TAKE 1 TABLET DAILY BEFORE BREAKFAST      rosuvastatin (CRESTOR) 5 MG tablet Take by mouth       No current facility-administered medications for this visit.         Past Medical History:   Diagnosis Date    Acquired hypothyroidism     Allergic rhinitis     asthma allergies    Arthritis     Cirrhosis ([facility]) 2020    CT finding (nodular liver)    CKD stage G3b/A1, GFR 30-44 and albumin creatinine ratio <30 mg/g (HCC)     Diabetes mellitus (HCC)     GERD (gastroesophageal reflux disease)     Gout     Hard of hearing     hard of hearing    History of blistering sunburn     teen; Sunburn, blistering [L55.1]    Hyperlipidemia     Hypertension     Osteopenia 10/2020    FRAX 17.3/3.6%    Ulcerative colitis ([address]) 10/28/2014     Past Surgical History:   Procedure Laterality Date    CATARACT REMOVAL Bilateral     COLONOSCOPY      ulcerative colitis    ORTHOPEDIC SURGERY

## 2023-07-06 LAB — URATE SERPL-MCNC: 4.2 MG/DL (ref 2.6–6)

## 2023-08-07 NOTE — TELEPHONE ENCOUNTER
Future Appointments:  Future Appointments   Date Time Provider [address]   1/5/2024 10:30 AM JAXSON Mejía - NP [facility] AMB        Last Appointment With Me:  Visit date not found     Requested Prescriptions     Pending Prescriptions Disp Refills    allopurinol (ZYLOPRIM) 100 MG tablet 90 tablet 1     Sig: Take 1 tablet by mouth daily

## 2023-08-08 RX ORDER — ALLOPURINOL 100 MG/1
100 TABLET ORAL DAILY
Qty: 90 TABLET | Refills: 1 | Status: SHIPPED | OUTPATIENT
Start: 2023-08-08

## 2023-08-22 NOTE — TELEPHONE ENCOUNTER
PCP: JAXSON Calvo NP    Last appt: 7/5/23  Future Appointments   Date Time Provider [address] [place] [address]   1/5/2024 10:30 AM JAXSON Calvo NP [facility] AMB       Last refilled:6/26/22    Requested Prescriptions     Pending Prescriptions Disp Refills    levothyroxine (SYNTHROID) 50 MCG tablet 90 tablet 1     Sig: TAKE 1 TABLET DAILY BEFORE BREAKFAST

## 2023-08-23 RX ORDER — LEVOTHYROXINE SODIUM 0.05 MG/1
TABLET ORAL
Qty: 90 TABLET | Refills: 1 | Status: SHIPPED | OUTPATIENT
Start: 2023-08-23

## 2023-08-25 ENCOUNTER — TRANSCRIBE ORDERS (OUTPATIENT)
Facility: HOSPITAL | Age: 88
End: 2023-08-25

## 2023-08-25 DIAGNOSIS — M51.36 DDD (DEGENERATIVE DISC DISEASE), LUMBAR: ICD-10-CM

## 2023-08-25 DIAGNOSIS — Z87.81 H/O: COMPRESSION FRACTURE OF SPINE: Primary | ICD-10-CM

## 2023-09-01 ENCOUNTER — TRANSCRIBE ORDERS (OUTPATIENT)
Facility: HOSPITAL | Age: 88
End: 2023-09-01

## 2023-09-01 DIAGNOSIS — S32.010G CLOSED COMPRESSION FRACTURE OF L1 LUMBAR VERTEBRA, WITH DELAYED HEALING, SUBSEQUENT ENCOUNTER: Primary | ICD-10-CM

## 2023-09-01 DIAGNOSIS — M80.08XG AGE-RELATED OSTEOPOROSIS WITH CURRENT PATHOLOGICAL FRACTURE OF VERTEBRA WITH DELAYED HEALING: ICD-10-CM

## 2023-09-15 ENCOUNTER — HOSPITAL ENCOUNTER (OUTPATIENT)
Facility: HOSPITAL | Age: 88
End: 2023-09-15
Payer: MEDICARE

## 2023-09-15 VITALS
HEIGHT: 64 IN | RESPIRATION RATE: 15 BRPM | SYSTOLIC BLOOD PRESSURE: 106 MMHG | DIASTOLIC BLOOD PRESSURE: 49 MMHG | BODY MASS INDEX: 24.24 KG/M2 | HEART RATE: 71 BPM | OXYGEN SATURATION: 96 % | WEIGHT: 142 LBS | TEMPERATURE: 97.8 F

## 2023-09-15 DIAGNOSIS — M80.08XG AGE-RELATED OSTEOPOROSIS WITH CURRENT PATHOLOGICAL FRACTURE OF VERTEBRA WITH DELAYED HEALING, SUBSEQUENT ENCOUNTER: ICD-10-CM

## 2023-09-15 DIAGNOSIS — S32.010G COMPRESSION FRACTURE OF L1 LUMBAR VERTEBRA, WITH DELAYED HEALING, SUBSEQUENT ENCOUNTER: ICD-10-CM

## 2023-09-15 PROCEDURE — 6360000004 HC RX CONTRAST MEDICATION: Performed by: STUDENT IN AN ORGANIZED HEALTH CARE EDUCATION/TRAINING PROGRAM

## 2023-09-15 PROCEDURE — 2580000003 HC RX 258: Performed by: STUDENT IN AN ORGANIZED HEALTH CARE EDUCATION/TRAINING PROGRAM

## 2023-09-15 PROCEDURE — 2500000003 HC RX 250 WO HCPCS: Performed by: STUDENT IN AN ORGANIZED HEALTH CARE EDUCATION/TRAINING PROGRAM

## 2023-09-15 PROCEDURE — 6360000002 HC RX W HCPCS: Performed by: STUDENT IN AN ORGANIZED HEALTH CARE EDUCATION/TRAINING PROGRAM

## 2023-09-15 PROCEDURE — 22514 PERQ VERTEBRAL AUGMENTATION: CPT

## 2023-09-15 RX ORDER — LIDOCAINE HYDROCHLORIDE 20 MG/ML
20 INJECTION, SOLUTION INFILTRATION; PERINEURAL ONCE
Status: COMPLETED | OUTPATIENT
Start: 2023-09-15 | End: 2023-09-15

## 2023-09-15 RX ORDER — KETOROLAC TROMETHAMINE 30 MG/ML
15 INJECTION, SOLUTION INTRAMUSCULAR; INTRAVENOUS ONCE
Status: COMPLETED | OUTPATIENT
Start: 2023-09-15 | End: 2023-09-15

## 2023-09-15 RX ORDER — MIDAZOLAM HYDROCHLORIDE 1 MG/ML
INJECTION, SOLUTION INTRAMUSCULAR; INTRAVENOUS PRN
Status: COMPLETED | OUTPATIENT
Start: 2023-09-15 | End: 2023-09-15

## 2023-09-15 RX ORDER — BUPIVACAINE HYDROCHLORIDE 5 MG/ML
20 INJECTION, SOLUTION EPIDURAL; INTRACAUDAL ONCE
Status: COMPLETED | OUTPATIENT
Start: 2023-09-15 | End: 2023-09-15

## 2023-09-15 RX ORDER — DIPHENHYDRAMINE HYDROCHLORIDE 50 MG/ML
25 INJECTION INTRAMUSCULAR; INTRAVENOUS ONCE
Status: COMPLETED | OUTPATIENT
Start: 2023-09-15 | End: 2023-09-15

## 2023-09-15 RX ORDER — FENTANYL CITRATE 50 UG/ML
INJECTION, SOLUTION INTRAMUSCULAR; INTRAVENOUS PRN
Status: COMPLETED | OUTPATIENT
Start: 2023-09-15 | End: 2023-09-15

## 2023-09-15 RX ORDER — IOPAMIDOL 408 MG/ML
2 INJECTION, SOLUTION INTRATHECAL ONCE
Status: COMPLETED | OUTPATIENT
Start: 2023-09-15 | End: 2023-09-15

## 2023-09-15 RX ORDER — HEPARIN SODIUM 200 [USP'U]/100ML
200 INJECTION, SOLUTION INTRAVENOUS ONCE
Status: DISCONTINUED | OUTPATIENT
Start: 2023-09-15 | End: 2023-09-19 | Stop reason: HOSPADM

## 2023-09-15 RX ORDER — MIDAZOLAM HYDROCHLORIDE 1 MG/ML
10 INJECTION INTRAMUSCULAR; INTRAVENOUS AS NEEDED
Status: DISCONTINUED | OUTPATIENT
Start: 2023-09-15 | End: 2023-09-19 | Stop reason: HOSPADM

## 2023-09-15 RX ORDER — FENTANYL CITRATE 50 UG/ML
200 INJECTION, SOLUTION INTRAMUSCULAR; INTRAVENOUS AS NEEDED
Status: DISCONTINUED | OUTPATIENT
Start: 2023-09-15 | End: 2023-09-19 | Stop reason: HOSPADM

## 2023-09-15 RX ADMIN — MIDAZOLAM HYDROCHLORIDE 2 MG: 1 INJECTION, SOLUTION INTRAMUSCULAR; INTRAVENOUS at 09:00

## 2023-09-15 RX ADMIN — MIDAZOLAM HYDROCHLORIDE 1 MG: 1 INJECTION, SOLUTION INTRAMUSCULAR; INTRAVENOUS at 09:14

## 2023-09-15 RX ADMIN — LIDOCAINE HYDROCHLORIDE 12 ML: 20 INJECTION, SOLUTION INFILTRATION; PERINEURAL at 09:38

## 2023-09-15 RX ADMIN — DIPHENHYDRAMINE HYDROCHLORIDE 25 MG: 50 INJECTION INTRAMUSCULAR; INTRAVENOUS at 08:55

## 2023-09-15 RX ADMIN — FENTANYL CITRATE 25 MCG: 50 INJECTION, SOLUTION INTRAMUSCULAR; INTRAVENOUS at 09:14

## 2023-09-15 RX ADMIN — BUPIVACAINE HYDROCHLORIDE 18 ML: 5 INJECTION, SOLUTION EPIDURAL; INTRACAUDAL; PERINEURAL at 09:39

## 2023-09-15 RX ADMIN — FENTANYL CITRATE 25 MCG: 50 INJECTION, SOLUTION INTRAMUSCULAR; INTRAVENOUS at 09:00

## 2023-09-15 RX ADMIN — IOPAMIDOL 2 ML: 408 INJECTION, SOLUTION INTRATHECAL at 09:37

## 2023-09-15 RX ADMIN — KETOROLAC TROMETHAMINE 15 MG: 30 INJECTION, SOLUTION INTRAMUSCULAR; INTRAVENOUS at 08:55

## 2023-09-15 RX ADMIN — WATER 2000 MG: 1 INJECTION INTRAMUSCULAR; INTRAVENOUS; SUBCUTANEOUS at 08:55

## 2023-09-15 ASSESSMENT — PAIN - FUNCTIONAL ASSESSMENT: PAIN_FUNCTIONAL_ASSESSMENT: 0-10

## 2023-09-15 NOTE — PROGRESS NOTES
Patient arrived to IR via wheelchair AxO4. VSS and in no apparent distress at this time. Informed consent obtained by Benito Matias. Patient given the opportunity to ask questions and all concerns were addressed at this time. Patient states there is no history of MRSA,C. Diff,VRE, and TB    0915- Name of Procedure: Kyphoplasty L1     Sedation medications given:      Versed: 3 mg     Fentanyl:  50 mcg     Sedation Tolerated: Well     Procedure and sedation times are the same. Sedation Start: 0900  Sedation End: 0915     Vital Signs:  VSS     Fluids Removed: None     Samples sent to lab: None     Any complications related to procedure: none identified at this time     Patient is A&Ox4, on RA, and is in NAD at this time. Patient is eating crackers and drinking water at this time. This patient is at an increased risk of falling because they have received sedating medications. Please evaluate and implement fall precautions/fall prevention practices as appropriate. 1115- Discharge instructions reviewed with patient. Patient verbalizes understanding. Discharged from IR via wheelchair in stable condition. Pt released with family member via wheelchair.

## 2023-09-15 NOTE — H&P
Radiology History and Physical    Patient: Narcisa Cannon 81 y.o. female       Chief Complaint: No chief complaint on file. History of Present Illness: 81year old woman with L1 compression fracture. Consultation performed in clinic earlier this week, no changes to history. History:    Past Medical History:   Diagnosis Date    Acquired hypothyroidism     Allergic rhinitis     asthma allergies    Arthritis     Cirrhosis ([address]) 09/2020    CT finding (nodular liver)    CKD stage G3b/A1, GFR 30-44 and albumin creatinine ratio <30 mg/g (HCC)     Diabetes mellitus (HCC)     GERD (gastroesophageal reflux disease)     Gout     Hard of hearing     hard of hearing    History of blistering sunburn     teen; Sunburn, blistering [L55.1]    Hyperlipidemia     Hypertension     Osteopenia 10/2020    FRAX 17.3/3.6%    Ulcerative colitis ([facility] [address] [facility]) 10/28/2014     Family History   Problem Relation Age of Onset    Diabetes Daughter     Hypertension Mother     Heart Disease Father      Social History     Socioeconomic History    Marital status:      Spouse name: Not on file    Number of children: Not on file    Years of education: Not on file    Highest education level: Not on file   Occupational History    Not on file   Tobacco Use    Smoking status: Never    Smokeless tobacco: Never   Vaping Use    Vaping Use: Never used   Substance and Sexual Activity    Alcohol use: No    Drug use: No    Sexual activity: Not on file   Other Topics Concern    Not on file   Social History Narrative    Not on file     Social Determinants of Health     Financial Resource Strain: Not on file   Food Insecurity: Not on file   Transportation Needs: Not on file   Physical Activity: Not on file   Stress: Not on file   Social Connections: Not on file   Intimate Partner Violence: Not on file   Housing Stability: Not on file       Allergies:    Allergies   Allergen Reactions    Cefdinir Diarrhea    Penicillin G Rash    Statins Other (See Comments)

## 2023-09-15 NOTE — DISCHARGE INSTRUCTIONS
[facility] [place]  Department of Interventional Radiology  [phone]    Radiologist: Pedro Pablo Jim    Date: September 15, 2023       Kyphoplasty Discharge Instructions    Go home and rest  and restrict your activity the next 24 hours. You have been given sedating medications, so do not drive or drink alcohol today. Resume your previous diet and medications. You may take Tylenol, as directed on the label, for pain or discomfort. Avoid Ibuprofen (Advil, Motrin etc.) and Aspirin today as they may increase your risk of bleeding. Avoid heavy lifting (nothing greater than 5 pounds),  excessive bending,  and pushing or pulling movements for one week. Then begin to advance your activity as tolerated. Be sure to follow up with your physician, and let him know how you are progressing. If a biopsy sample was collected, your results will be sent to your ordering physician. If you have new severe pain, numbness, tingling, or weakness in your legs go to the nearest emergency department, and tell them you have had a Kyphoplasty.

## 2023-09-15 NOTE — PROCEDURES
Interventional Radiology  Procedure Note        9/15/2023 9:34 AM    Patient: Varun Parent     Informed consent obtained    Diagnosis: L1 compression fracture    Procedure(s): L1 kyphoplasty    Specimens removed:  none    Complications: None    Primary Physician: Edgardo Murray MD    Recommendations: N/A    Discharge Disposition: Stable; discharge to home    Full dictated report to follow    Edgardo Murray MD  Interventional Radiology  [facility], [facility].  9:34 AM, 9/15/2023

## 2023-09-18 RX ORDER — CARVEDILOL 3.12 MG/1
3.12 TABLET ORAL 2 TIMES DAILY WITH MEALS
Qty: 180 TABLET | Refills: 1 | Status: SHIPPED | OUTPATIENT
Start: 2023-09-18

## 2023-09-18 NOTE — TELEPHONE ENCOUNTER
Pharmacy: [facility]        carvediloL (COREG) 3.125 mg tablet   Qty: 180 Tablet 4 6/29/2022    Sig: TAKE 1 TABLET TWICE A DAY WITH MEALS

## 2023-09-22 ENCOUNTER — HOSPITAL ENCOUNTER (OUTPATIENT)
Facility: HOSPITAL | Age: 88
End: 2023-09-22
Payer: MEDICARE

## 2023-09-22 VITALS
OXYGEN SATURATION: 92 % | RESPIRATION RATE: 14 BRPM | SYSTOLIC BLOOD PRESSURE: 124 MMHG | DIASTOLIC BLOOD PRESSURE: 55 MMHG | TEMPERATURE: 97.6 F | HEART RATE: 77 BPM

## 2023-09-22 DIAGNOSIS — S32.000A COMPRESSION FRACTURE OF LUMBAR VERTEBRA, UNSPECIFIED LUMBAR VERTEBRAL LEVEL, INITIAL ENCOUNTER (HCC): ICD-10-CM

## 2023-09-22 PROCEDURE — 6360000002 HC RX W HCPCS: Performed by: STUDENT IN AN ORGANIZED HEALTH CARE EDUCATION/TRAINING PROGRAM

## 2023-09-22 PROCEDURE — 2580000003 HC RX 258: Performed by: STUDENT IN AN ORGANIZED HEALTH CARE EDUCATION/TRAINING PROGRAM

## 2023-09-22 PROCEDURE — 2500000003 HC RX 250 WO HCPCS: Performed by: NURSE PRACTITIONER

## 2023-09-22 PROCEDURE — C1713 ANCHOR/SCREW BN/BN,TIS/BN: HCPCS

## 2023-09-22 PROCEDURE — 6360000002 HC RX W HCPCS: Performed by: NURSE PRACTITIONER

## 2023-09-22 PROCEDURE — 6360000004 HC RX CONTRAST MEDICATION: Performed by: NURSE PRACTITIONER

## 2023-09-22 RX ORDER — DIPHENHYDRAMINE HYDROCHLORIDE 50 MG/ML
25 INJECTION INTRAMUSCULAR; INTRAVENOUS ONCE
Status: COMPLETED | OUTPATIENT
Start: 2023-09-22 | End: 2023-09-22

## 2023-09-22 RX ORDER — LIDOCAINE HYDROCHLORIDE 20 MG/ML
20 INJECTION, SOLUTION INFILTRATION; PERINEURAL ONCE
Status: COMPLETED | OUTPATIENT
Start: 2023-09-22 | End: 2023-09-22

## 2023-09-22 RX ORDER — FENTANYL CITRATE 50 UG/ML
200 INJECTION, SOLUTION INTRAMUSCULAR; INTRAVENOUS
Status: DISCONTINUED | OUTPATIENT
Start: 2023-09-22 | End: 2023-09-22

## 2023-09-22 RX ORDER — SODIUM CHLORIDE 9 MG/ML
INJECTION, SOLUTION INTRAVENOUS CONTINUOUS
Status: DISCONTINUED | OUTPATIENT
Start: 2023-09-22 | End: 2023-09-22

## 2023-09-22 RX ORDER — 0.9 % SODIUM CHLORIDE 0.9 %
1000 INTRAVENOUS SOLUTION INTRAVENOUS ONCE
Status: COMPLETED | OUTPATIENT
Start: 2023-09-22 | End: 2023-09-22

## 2023-09-22 RX ORDER — MIDAZOLAM HYDROCHLORIDE 1 MG/ML
10 INJECTION INTRAMUSCULAR; INTRAVENOUS
Status: DISCONTINUED | OUTPATIENT
Start: 2023-09-22 | End: 2023-09-22

## 2023-09-22 RX ORDER — IOPAMIDOL 612 MG/ML
15 INJECTION, SOLUTION INTRATHECAL ONCE
Status: COMPLETED | OUTPATIENT
Start: 2023-09-22 | End: 2023-09-22

## 2023-09-22 RX ORDER — MIDAZOLAM HYDROCHLORIDE 1 MG/ML
INJECTION, SOLUTION INTRAMUSCULAR; INTRAVENOUS PRN
Status: COMPLETED | OUTPATIENT
Start: 2023-09-22 | End: 2023-09-22

## 2023-09-22 RX ORDER — CLINDAMYCIN PHOSPHATE 600 MG/50ML
600 INJECTION INTRAVENOUS EVERY 8 HOURS
Status: DISCONTINUED | OUTPATIENT
Start: 2023-09-22 | End: 2023-09-26 | Stop reason: HOSPADM

## 2023-09-22 RX ORDER — KETOROLAC TROMETHAMINE 30 MG/ML
30 INJECTION, SOLUTION INTRAMUSCULAR; INTRAVENOUS EVERY 6 HOURS PRN
Status: DISCONTINUED | OUTPATIENT
Start: 2023-09-22 | End: 2023-09-22

## 2023-09-22 RX ORDER — HEPARIN SODIUM 200 [USP'U]/100ML
200 INJECTION, SOLUTION INTRAVENOUS ONCE
Status: DISCONTINUED | OUTPATIENT
Start: 2023-09-22 | End: 2023-09-26 | Stop reason: HOSPADM

## 2023-09-22 RX ORDER — BUPIVACAINE HYDROCHLORIDE 5 MG/ML
20 INJECTION, SOLUTION EPIDURAL; INTRACAUDAL ONCE
Status: COMPLETED | OUTPATIENT
Start: 2023-09-22 | End: 2023-09-22

## 2023-09-22 RX ADMIN — FENTANYL CITRATE 25 MCG: 50 INJECTION, SOLUTION INTRAMUSCULAR; INTRAVENOUS at 14:06

## 2023-09-22 RX ADMIN — KETOROLAC TROMETHAMINE 30 MG: 30 INJECTION, SOLUTION INTRAMUSCULAR; INTRAVENOUS at 13:28

## 2023-09-22 RX ADMIN — BUPIVACAINE HYDROCHLORIDE 20 ML: 5 INJECTION, SOLUTION EPIDURAL; INTRACAUDAL; PERINEURAL at 14:43

## 2023-09-22 RX ADMIN — MIDAZOLAM HYDROCHLORIDE 1 MG: 1 INJECTION, SOLUTION INTRAMUSCULAR; INTRAVENOUS at 14:05

## 2023-09-22 RX ADMIN — LIDOCAINE HYDROCHLORIDE 8 ML: 20 INJECTION, SOLUTION INFILTRATION; PERINEURAL at 14:43

## 2023-09-22 RX ADMIN — IOPAMIDOL 2 ML: 612 INJECTION, SOLUTION INTRATHECAL at 14:42

## 2023-09-22 RX ADMIN — SODIUM CHLORIDE 1000 ML: 9 INJECTION, SOLUTION INTRAVENOUS at 10:50

## 2023-09-22 RX ADMIN — CLINDAMYCIN IN 5 PERCENT DEXTROSE 600 MG: 12 INJECTION, SOLUTION INTRAVENOUS at 13:17

## 2023-09-22 RX ADMIN — DIPHENHYDRAMINE HYDROCHLORIDE 25 MG: 50 INJECTION INTRAMUSCULAR; INTRAVENOUS at 12:00

## 2023-09-22 ASSESSMENT — PAIN SCALES - GENERAL: PAINLEVEL_OUTOF10: 10

## 2023-09-22 ASSESSMENT — PAIN DESCRIPTION - LOCATION: LOCATION: BACK

## 2023-09-22 NOTE — DISCHARGE INSTRUCTIONS
[facility]  Department of Interventional Radiology  [phone]    Radiologist:   Rosa Wilcox    Date:  9/22/2023       Kyphoplasty Discharge Instructions    Go home and rest  and restrict your activity the next 24 hours. You have been given sedating medications, so do not drive or drink alcohol today. Resume your previous diet and medications. You may take Tylenol, as directed on the label, for pain or discomfort. Avoid Ibuprofen (Advil, Motrin etc.) and Aspirin today as they may increase your risk of bleeding. Avoid heavy lifting (nothing greater than 5 pounds),  excessive bending,  and pushing or pulling movements for one week. Then begin to advance your activity as tolerated. Be sure to follow up with your physician, and let him know how you are progressing. If a biopsy sample was collected, your results will be sent to your ordering physician. If you have new severe pain, numbness, tingling, or weakness in your legs go to the nearest emergency department, and tell them you have had a Kyphoplasty.

## 2023-09-22 NOTE — PROGRESS NOTES
Patient arrived from home with spouse. Patient stated she has not been drink because she did not want to have to get up to use the restroom. Informed MD Levi Esquivel orders given.

## 2023-09-22 NOTE — PROGRESS NOTES
Name of Procedure: Kypoplasty T9     Sedation medications given: Yes     Versed: 1 mg     Fentanyl:  25 mcg     Sedation Tolerated: Well     Procedure and sedation times are the same. Sedation Start: 1404 pm  Sedation End: 1421 pm     Vital Signs:  stable       Any complications related to procedure: none identified at this time     Patient is A&Ox4, on RA, and is in NAD at this time. Patient is eating crackers and drinking water at this time. Pts spouse (family) in waiting area for pt. This patient is at an increased risk of falling because they have received sedating medications. Please evaluate and implement fall precautions/fall prevention practices as appropriate.

## 2023-09-22 NOTE — PROCEDURES
Interventional Radiology  Procedure Note        9/22/2023 3:28 PM    Patient: Temi Bennett     Informed consent obtained    Diagnosis: T9 compression fracture    Procedure(s): T9 kyphoplasty    Specimens removed:  none    Complications: None    Primary Physician: Li Zaidi MD    Recommendations: N/A    Discharge Disposition: Stable; discharge to home    Full dictated report to follow    Li Zaidi MD  Interventional Radiology  [facility]  3:28 PM, 9/22/2023

## 2023-09-22 NOTE — H&P
Radiology History and Physical    Patient: Letty Tobin 81 y.o. female       Chief Complaint: No chief complaint on file. History of Present Illness: 81year old woman with T9 compression fracture. Had a fall just before recent L1 kyphoplasty, which did not improve her pain. Repeat imaging showed T9 compression fracture. History:    Past Medical History:   Diagnosis Date    Acquired hypothyroidism     Allergic rhinitis     asthma allergies    Arthritis     Cirrhosis ([address]) 09/2020    CT finding (nodular liver)    CKD stage G3b/A1, GFR 30-44 and albumin creatinine ratio <30 mg/g (HCC)     Diabetes mellitus (HCC)     GERD (gastroesophageal reflux disease)     Gout     Hard of hearing     hard of hearing    History of blistering sunburn     teen; Sunburn, blistering [L55.1]    Hyperlipidemia     Hypertension     Osteopenia 10/2020    FRAX 17.3/3.6%    Ulcerative colitis ([address]) 10/28/2014     Family History   Problem Relation Age of Onset    Diabetes Daughter     Hypertension Mother     Heart Disease Father      Social History     Socioeconomic History    Marital status:      Spouse name: Not on file    Number of children: Not on file    Years of education: Not on file    Highest education level: Not on file   Occupational History    Not on file   Tobacco Use    Smoking status: Never    Smokeless tobacco: Never   Vaping Use    Vaping Use: Never used   Substance and Sexual Activity    Alcohol use: No    Drug use: No    Sexual activity: Not on file   Other Topics Concern    Not on file   Social History Narrative    Not on file     Social Determinants of Health     Financial Resource Strain: Not on file   Food Insecurity: Not on file   Transportation Needs: Not on file   Physical Activity: Not on file   Stress: Not on file   Social Connections: Not on file   Intimate Partner Violence: Not on file   Housing Stability: Not on file       Allergies:    Allergies   Allergen Reactions    Cefdinir Diarrhea

## 2023-09-25 NOTE — TELEPHONE ENCOUNTER
Requested Prescriptions     Pending Prescriptions Disp Refills    felodipine (PLENDIL) 5 MG extended release tablet 90 tablet 3     Sig: Take 1 tablet by mouth daily       RX refill request from the patient/pharmacy. Patient last seen 7/5/23 with labs, and next appt. scheduled for 1/5/24.

## 2023-09-28 RX ORDER — FELODIPINE 5 MG/1
5 TABLET, EXTENDED RELEASE ORAL DAILY
Qty: 90 TABLET | Refills: 3 | Status: SHIPPED | OUTPATIENT
Start: 2023-09-28

## 2023-11-28 PROBLEM — M85.80 OSTEOPENIA: Status: ACTIVE | Noted: 2020-10-01

## 2023-11-28 PROBLEM — R73.02 GLUCOSE INTOLERANCE (IMPAIRED GLUCOSE TOLERANCE): Status: ACTIVE | Noted: 2017-02-03

## 2023-11-28 PROBLEM — S32.010D COMPRESSION FRACTURE OF L1 VERTEBRA WITH ROUTINE HEALING: Status: ACTIVE | Noted: 2023-11-28

## 2023-11-28 PROBLEM — S22.070D COMPRESSION FRACTURE OF T9 VERTEBRA WITH ROUTINE HEALING: Status: ACTIVE | Noted: 2023-11-28

## 2023-11-28 PROBLEM — M10.9 GOUT: Status: ACTIVE | Noted: 2018-09-04

## 2023-11-29 ENCOUNTER — OFFICE VISIT (OUTPATIENT)
Facility: CLINIC | Age: 88
End: 2023-11-29
Payer: MEDICARE

## 2023-11-29 VITALS
SYSTOLIC BLOOD PRESSURE: 144 MMHG | DIASTOLIC BLOOD PRESSURE: 74 MMHG | BODY MASS INDEX: 22.67 KG/M2 | OXYGEN SATURATION: 95 % | HEIGHT: 64 IN | WEIGHT: 132.8 LBS | HEART RATE: 81 BPM | RESPIRATION RATE: 17 BRPM | TEMPERATURE: 97.4 F

## 2023-11-29 DIAGNOSIS — S32.010D COMPRESSION FRACTURE OF L1 VERTEBRA WITH ROUTINE HEALING: ICD-10-CM

## 2023-11-29 DIAGNOSIS — N18.30 STAGE 3 CHRONIC KIDNEY DISEASE, UNSPECIFIED WHETHER STAGE 3A OR 3B CKD (HCC): ICD-10-CM

## 2023-11-29 DIAGNOSIS — I10 PRIMARY HYPERTENSION: Primary | ICD-10-CM

## 2023-11-29 DIAGNOSIS — S22.070D COMPRESSION FRACTURE OF T9 VERTEBRA WITH ROUTINE HEALING: ICD-10-CM

## 2023-11-29 DIAGNOSIS — R07.81 RIB PAIN ON LEFT SIDE: ICD-10-CM

## 2023-11-29 DIAGNOSIS — M54.50 LEFT-SIDED LOW BACK PAIN WITHOUT SCIATICA, UNSPECIFIED CHRONICITY: ICD-10-CM

## 2023-11-29 PROCEDURE — 1036F TOBACCO NON-USER: CPT | Performed by: INTERNAL MEDICINE

## 2023-11-29 PROCEDURE — G8420 CALC BMI NORM PARAMETERS: HCPCS | Performed by: INTERNAL MEDICINE

## 2023-11-29 PROCEDURE — 1090F PRES/ABSN URINE INCON ASSESS: CPT | Performed by: INTERNAL MEDICINE

## 2023-11-29 PROCEDURE — G8427 DOCREV CUR MEDS BY ELIG CLIN: HCPCS | Performed by: INTERNAL MEDICINE

## 2023-11-29 PROCEDURE — 99214 OFFICE O/P EST MOD 30 MIN: CPT | Performed by: INTERNAL MEDICINE

## 2023-11-29 PROCEDURE — G8484 FLU IMMUNIZE NO ADMIN: HCPCS | Performed by: INTERNAL MEDICINE

## 2023-11-29 PROCEDURE — 1123F ACP DISCUSS/DSCN MKR DOCD: CPT | Performed by: INTERNAL MEDICINE

## 2023-11-29 RX ORDER — PREDNISONE 20 MG/1
20 TABLET ORAL DAILY
Qty: 5 TABLET | Refills: 0 | Status: SHIPPED | OUTPATIENT
Start: 2023-11-29 | End: 2023-12-04

## 2023-11-30 LAB
ANION GAP SERPL CALC-SCNC: 9 MMOL/L (ref 5–15)
BUN SERPL-MCNC: 30 MG/DL (ref 6–20)
BUN/CREAT SERPL: 19 (ref 12–20)
CALCIUM SERPL-MCNC: 9.1 MG/DL (ref 8.5–10.1)
CHLORIDE SERPL-SCNC: 111 MMOL/L (ref 97–108)
CO2 SERPL-SCNC: 22 MMOL/L (ref 21–32)
CREAT SERPL-MCNC: 1.54 MG/DL (ref 0.55–1.02)
GLUCOSE SERPL-MCNC: 116 MG/DL (ref 65–100)
POTASSIUM SERPL-SCNC: 4 MMOL/L (ref 3.5–5.1)
SODIUM SERPL-SCNC: 142 MMOL/L (ref 136–145)

## 2024-01-07 PROBLEM — Z00.00 MEDICARE ANNUAL WELLNESS VISIT, SUBSEQUENT: Status: ACTIVE | Noted: 2024-01-07

## 2024-01-07 PROBLEM — Z13.39 ALCOHOL SCREENING: Status: ACTIVE | Noted: 2024-01-07

## 2024-01-07 PROBLEM — R73.02 GLUCOSE INTOLERANCE (IMPAIRED GLUCOSE TOLERANCE): Status: RESOLVED | Noted: 2017-02-03 | Resolved: 2024-01-07

## 2024-01-07 PROBLEM — S22.009D CLOSED FRACTURE OF MULTIPLE THORACIC VERTEBRAE WITH ROUTINE HEALING: Status: ACTIVE | Noted: 2024-01-07

## 2024-01-08 ENCOUNTER — OFFICE VISIT (OUTPATIENT)
Facility: CLINIC | Age: 89
End: 2024-01-08
Payer: MEDICARE

## 2024-01-08 VITALS
OXYGEN SATURATION: 93 % | HEART RATE: 86 BPM | BODY MASS INDEX: 21.51 KG/M2 | WEIGHT: 125.3 LBS | TEMPERATURE: 97.7 F | DIASTOLIC BLOOD PRESSURE: 77 MMHG | SYSTOLIC BLOOD PRESSURE: 121 MMHG

## 2024-01-08 DIAGNOSIS — K21.9 GASTROESOPHAGEAL REFLUX DISEASE WITHOUT ESOPHAGITIS: ICD-10-CM

## 2024-01-08 DIAGNOSIS — Z13.39 ALCOHOL SCREENING: ICD-10-CM

## 2024-01-08 DIAGNOSIS — E78.2 MIXED HYPERLIPIDEMIA: ICD-10-CM

## 2024-01-08 DIAGNOSIS — S22.009D CLOSED FRACTURE OF MULTIPLE THORACIC VERTEBRAE WITH ROUTINE HEALING: ICD-10-CM

## 2024-01-08 DIAGNOSIS — M15.9 PRIMARY OSTEOARTHRITIS INVOLVING MULTIPLE JOINTS: ICD-10-CM

## 2024-01-08 DIAGNOSIS — M1A.0410 IDIOPATHIC CHRONIC GOUT OF RIGHT HAND WITHOUT TOPHUS: ICD-10-CM

## 2024-01-08 DIAGNOSIS — M85.89 OSTEOPENIA OF MULTIPLE SITES: ICD-10-CM

## 2024-01-08 DIAGNOSIS — E11.22 TYPE 2 DIABETES MELLITUS WITH STAGE 3B CHRONIC KIDNEY DISEASE, WITHOUT LONG-TERM CURRENT USE OF INSULIN (HCC): ICD-10-CM

## 2024-01-08 DIAGNOSIS — Z00.00 MEDICARE ANNUAL WELLNESS VISIT, SUBSEQUENT: ICD-10-CM

## 2024-01-08 DIAGNOSIS — N18.30 STAGE 3 CHRONIC KIDNEY DISEASE, UNSPECIFIED WHETHER STAGE 3A OR 3B CKD (HCC): ICD-10-CM

## 2024-01-08 DIAGNOSIS — J30.9 ALLERGIC RHINITIS, UNSPECIFIED SEASONALITY, UNSPECIFIED TRIGGER: ICD-10-CM

## 2024-01-08 DIAGNOSIS — E55.9 VITAMIN D DEFICIENCY: ICD-10-CM

## 2024-01-08 DIAGNOSIS — E03.9 HYPOTHYROIDISM, UNSPECIFIED TYPE: ICD-10-CM

## 2024-01-08 DIAGNOSIS — N18.32 TYPE 2 DIABETES MELLITUS WITH STAGE 3B CHRONIC KIDNEY DISEASE, WITHOUT LONG-TERM CURRENT USE OF INSULIN (HCC): ICD-10-CM

## 2024-01-08 DIAGNOSIS — K51.00 ULCERATIVE PANCOLITIS WITHOUT COMPLICATION (HCC): ICD-10-CM

## 2024-01-08 DIAGNOSIS — I10 PRIMARY HYPERTENSION: Primary | ICD-10-CM

## 2024-01-08 PROCEDURE — G0439 PPPS, SUBSEQ VISIT: HCPCS | Performed by: INTERNAL MEDICINE

## 2024-01-08 PROCEDURE — 1123F ACP DISCUSS/DSCN MKR DOCD: CPT | Performed by: INTERNAL MEDICINE

## 2024-01-08 PROCEDURE — G8420 CALC BMI NORM PARAMETERS: HCPCS | Performed by: INTERNAL MEDICINE

## 2024-01-08 PROCEDURE — 99214 OFFICE O/P EST MOD 30 MIN: CPT | Performed by: INTERNAL MEDICINE

## 2024-01-08 PROCEDURE — G8427 DOCREV CUR MEDS BY ELIG CLIN: HCPCS | Performed by: INTERNAL MEDICINE

## 2024-01-08 PROCEDURE — G0442 ANNUAL ALCOHOL SCREEN 15 MIN: HCPCS | Performed by: INTERNAL MEDICINE

## 2024-01-08 PROCEDURE — 1090F PRES/ABSN URINE INCON ASSESS: CPT | Performed by: INTERNAL MEDICINE

## 2024-01-08 PROCEDURE — 1036F TOBACCO NON-USER: CPT | Performed by: INTERNAL MEDICINE

## 2024-01-08 PROCEDURE — G8484 FLU IMMUNIZE NO ADMIN: HCPCS | Performed by: INTERNAL MEDICINE

## 2024-01-08 RX ORDER — ESCITALOPRAM OXALATE 10 MG/1
10 TABLET ORAL DAILY
Qty: 30 TABLET | Refills: 3 | Status: SHIPPED | OUTPATIENT
Start: 2024-01-08

## 2024-01-08 ASSESSMENT — PATIENT HEALTH QUESTIONNAIRE - PHQ9
SUM OF ALL RESPONSES TO PHQ9 QUESTIONS 1 & 2: 6
SUM OF ALL RESPONSES TO PHQ QUESTIONS 1-9: 15
6. FEELING BAD ABOUT YOURSELF - OR THAT YOU ARE A FAILURE OR HAVE LET YOURSELF OR YOUR FAMILY DOWN: 0
1. LITTLE INTEREST OR PLEASURE IN DOING THINGS: 3
2. FEELING DOWN, DEPRESSED OR HOPELESS: 3
SUM OF ALL RESPONSES TO PHQ QUESTIONS 1-9: 15
7. TROUBLE CONCENTRATING ON THINGS, SUCH AS READING THE NEWSPAPER OR WATCHING TELEVISION: 0
5. POOR APPETITE OR OVEREATING: 3
4. FEELING TIRED OR HAVING LITTLE ENERGY: 3
8. MOVING OR SPEAKING SO SLOWLY THAT OTHER PEOPLE COULD HAVE NOTICED. OR THE OPPOSITE, BEING SO FIGETY OR RESTLESS THAT YOU HAVE BEEN MOVING AROUND A LOT MORE THAN USUAL: 0
10. IF YOU CHECKED OFF ANY PROBLEMS, HOW DIFFICULT HAVE THESE PROBLEMS MADE IT FOR YOU TO DO YOUR WORK, TAKE CARE OF THINGS AT HOME, OR GET ALONG WITH OTHER PEOPLE: 2
3. TROUBLE FALLING OR STAYING ASLEEP: 3
SUM OF ALL RESPONSES TO PHQ QUESTIONS 1-9: 15
SUM OF ALL RESPONSES TO PHQ QUESTIONS 1-9: 15

## 2024-01-08 ASSESSMENT — LIFESTYLE VARIABLES
HOW MANY STANDARD DRINKS CONTAINING ALCOHOL DO YOU HAVE ON A TYPICAL DAY: PATIENT DOES NOT DRINK
HOW OFTEN DO YOU HAVE A DRINK CONTAINING ALCOHOL: NEVER

## 2024-01-08 NOTE — PROGRESS NOTES
Chief Complaint   Patient presents with    Medicare AWV    Hypertension    Cholesterol Problem     MWV    1. Have you been to the ER, urgent care clinic since your last visit?  Hospitalized since your last visit?no    2. Have you seen or consulted any other health care providers outside of the [facility] since your last visit?  Include any pap smears or colon screening. no   
strength.  No syncope, dizziness or frequent headache  Skin:  No recent rashes or mole changes.  Psychiatric/Behavioral:  Negative for depression or initial impression otherwise I discussed with her she is in agreement that there may be some depression.  The patient is not nervous/anxious.   HEMATOLOGIC: no easy bruising or bleeding gums  Endocrine: no sweats of urinary frequency or excessive thirst        Objective   Vitals:    01/08/24 1437   BP: 121/77   Site: Right Upper Arm   Position: Sitting   Pulse: 86   Temp: 97.7 °F (36.5 °C)   SpO2: 93%   Weight: 56.8 kg (125 lb 4.8 oz)      Body mass index is 21.51 kg/m².        [unfilled]     PHYSICAL EXAM:    General appearance - alert, well appearing, and in no distress  Mental status - alert, oriented to person, place, and time  HEENT:  Ears - bilateral TM's and external ear canals clear  Eyes - pupillary responses were normal.  Extraocular muscle function intact.  Lids and conjunctiva not injected.  Fundoscopic exam revealed sharp disc margins.eye movements intact  Pharynx- clear with teeth in good repair.  No masses were noted  Neck - supple without thyromegaly or burit.  No JVD noted  Lungs - clear to auscultation and percussion  Cardiac- normal rate, regular rhythm without murmurs.  PMI not displaced.  No gallop, rub or click  Breast: deferred to GYN  Abdomen - flat, soft, non-tender without palpable organomegaly or mass.  No pulsatile mass was felt, and not bruit was heard.  Bowel sounds were active   Female - deferred to GYN  Rectal - deferred to GYN  Extremities -  no clubbing cyanosis or edema  Lymphatics - no palpable lymphadenopathy, no hepatosplenomegaly  Peripheral vascular - Dorsalis pedis and posterior tibial pulses felt without difficulty  Skin - no rash or unusual mole change noted  Neurological - Cranial nerves II-XII grossly intact.  Motor strength with mild generalized nonfocal weakness.  DTR's 2+ and symmetric.  Station and gait normal  Back exam

## 2024-01-08 NOTE — PATIENT INSTRUCTIONS
get the right balance of nutrients, including vitamins and minerals. Small changes add up over time. You can start by adding one healthy food to your meals each day.    Try to make half your plate fruits and vegetables, one-fourth whole grains, and one-fourth lean proteins. Try including dairy with your meals.   Eat more fruits and vegetables. Try to have them with most meals and snacks.   Foods for healthy eating    Fruits    These can be fresh, frozen, canned, or dried.  Try to choose whole fruit rather than fruit juice.  Eat a variety of colors.    Vegetables    These can be fresh, frozen, canned, or dried.  Beans, peas, and lentils count too.    Whole grains    Choose whole-grain breads, cereals, and noodles.  Try brown rice.    Lean proteins    These can include lean meat, poultry, fish, and eggs.  You can also have tofu, beans, peas, lentils, nuts, and seeds.    Dairy    Try milk, yogurt, and cheese.  Choose low-fat or fat-free when you can.  If you need to, use lactose-free milk or fortified plant-based milk products, such as soy milk.    Water    Drink water when you're thirsty.  Limit sugar-sweetened drinks, including soda, fruit drinks, and sports drinks.  Where can you learn more?  Go to https://www.[employer].net/patientEd and enter T756 to learn more about \"Eating Healthy Foods: Care Instructions.\"  Current as of: September 20, 2023               Content Version: 13.9  © 6968-4700 [employer].   Care instructions adapted under license by [employer]. If you have questions about a medical condition or this instruction, always ask your healthcare professional. [employer] disclaims any warranty or liability for your use of this information.           A Healthy Heart: Care Instructions  Your Care Instructions     Coronary artery disease, also called heart disease, occurs when a substance called plaque builds up in the vessels that supply oxygen-rich blood to your heart muscle.

## 2024-01-09 LAB
25(OH)D3 SERPL-MCNC: 48.9 NG/ML (ref 30–100)
ALBUMIN SERPL-MCNC: 3.8 G/DL (ref 3.5–5)
ALBUMIN/GLOB SERPL: 0.9 (ref 1.1–2.2)
ALP SERPL-CCNC: 132 U/L (ref 45–117)
ALT SERPL-CCNC: 33 U/L (ref 12–78)
ANION GAP SERPL CALC-SCNC: 6 MMOL/L (ref 5–15)
AST SERPL-CCNC: 29 U/L (ref 15–37)
BASOPHILS # BLD: 0.1 K/UL (ref 0–0.1)
BASOPHILS NFR BLD: 1 % (ref 0–1)
BILIRUB SERPL-MCNC: 0.5 MG/DL (ref 0.2–1)
BUN SERPL-MCNC: 35 MG/DL (ref 6–20)
BUN/CREAT SERPL: 23 (ref 12–20)
CALCIUM SERPL-MCNC: 11.4 MG/DL (ref 8.5–10.1)
CHLORIDE SERPL-SCNC: 113 MMOL/L (ref 97–108)
CHOLEST SERPL-MCNC: 212 MG/DL
CK SERPL-CCNC: 29 U/L (ref 26–192)
CO2 SERPL-SCNC: 23 MMOL/L (ref 21–32)
CREAT SERPL-MCNC: 1.52 MG/DL (ref 0.55–1.02)
CREAT UR-MCNC: 94.7 MG/DL
DIFFERENTIAL METHOD BLD: ABNORMAL
EOSINOPHIL # BLD: 0.4 K/UL (ref 0–0.4)
EOSINOPHIL NFR BLD: 4 % (ref 0–7)
ERYTHROCYTE [DISTWIDTH] IN BLOOD BY AUTOMATED COUNT: 16.1 % (ref 11.5–14.5)
EST. AVERAGE GLUCOSE BLD GHB EST-MCNC: 128 MG/DL
GLOBULIN SER CALC-MCNC: 4.2 G/DL (ref 2–4)
GLUCOSE SERPL-MCNC: 119 MG/DL (ref 65–100)
HBA1C MFR BLD: 6.1 % (ref 4–5.6)
HCT VFR BLD AUTO: 38.6 % (ref 35–47)
HDLC SERPL-MCNC: 68 MG/DL
HDLC SERPL: 3.1 (ref 0–5)
HGB BLD-MCNC: 12 G/DL (ref 11.5–16)
IMM GRANULOCYTES # BLD AUTO: 0 K/UL
IMM GRANULOCYTES NFR BLD AUTO: 0 %
LDLC SERPL CALC-MCNC: 105.8 MG/DL (ref 0–100)
LYMPHOCYTES # BLD: 7 K/UL (ref 0.8–3.5)
LYMPHOCYTES NFR BLD: 62 % (ref 12–49)
MCH RBC QN AUTO: 31.7 PG (ref 26–34)
MCHC RBC AUTO-ENTMCNC: 31.1 G/DL (ref 30–36.5)
MCV RBC AUTO: 102.1 FL (ref 80–99)
MICROALBUMIN UR-MCNC: 2.04 MG/DL
MICROALBUMIN/CREAT UR-RTO: 22 MG/G (ref 0–30)
MONOCYTES # BLD: 0.7 K/UL (ref 0–1)
MONOCYTES NFR BLD: 6 % (ref 5–13)
NEUTS SEG # BLD: 3 K/UL (ref 1.8–8)
NEUTS SEG NFR BLD: 27 % (ref 32–75)
NRBC # BLD: 0 K/UL (ref 0–0.01)
NRBC BLD-RTO: 0 PER 100 WBC
PLATELET # BLD AUTO: 275 K/UL (ref 150–400)
PMV BLD AUTO: 12.4 FL (ref 8.9–12.9)
POTASSIUM SERPL-SCNC: 4.1 MMOL/L (ref 3.5–5.1)
PROT SERPL-MCNC: 8 G/DL (ref 6.4–8.2)
RBC # BLD AUTO: 3.78 M/UL (ref 3.8–5.2)
RBC MORPH BLD: ABNORMAL
RBC MORPH BLD: ABNORMAL
SODIUM SERPL-SCNC: 142 MMOL/L (ref 136–145)
T4 FREE SERPL-MCNC: 1.1 NG/DL (ref 0.8–1.5)
TRIGL SERPL-MCNC: 191 MG/DL
TSH SERPL DL<=0.05 MIU/L-ACNC: 2.16 UIU/ML (ref 0.36–3.74)
VLDLC SERPL CALC-MCNC: 38.2 MG/DL
WBC # BLD AUTO: 11.2 K/UL (ref 3.6–11)
WBC MORPH BLD: ABNORMAL

## 2024-02-05 RX ORDER — ALLOPURINOL 100 MG/1
100 TABLET ORAL DAILY
Qty: 90 TABLET | Refills: 3 | Status: SHIPPED | OUTPATIENT
Start: 2024-02-05

## 2024-02-05 RX ORDER — LEVOTHYROXINE SODIUM 0.05 MG/1
TABLET ORAL
Qty: 90 TABLET | Refills: 3 | Status: SHIPPED | OUTPATIENT
Start: 2024-02-05

## 2024-02-05 NOTE — TELEPHONE ENCOUNTER
PCP: Benjy Bocanegra, APRN - NP    Last appt: 7/5/2023  Future Appointments   Date Time Provider Department Center   7/8/2024 10:30 AM Boaz Solorzano MD [facility]       Requested Prescriptions     Pending Prescriptions Disp Refills    levothyroxine (SYNTHROID) 50 MCG tablet [Pharmacy Med Name: L-THYROXINE (SYNTHROID) TABS 50MCG] 90 tablet 3     Sig: TAKE 1 TABLET DAILY BEFORE BREAKFAST    allopurinol (ZYLOPRIM) 100 MG tablet [Pharmacy Med Name: ALLOPURINOL TABS 100MG] 90 tablet 3     Sig: TAKE 1 TABLET DAILY       Prior labs and Blood pressures:  BP Readings from Last 3 Encounters:   01/08/24 121/77   11/29/23 (!) 144/74   09/22/23 (!) 124/55     Lab Results   Component Value Date/Time     01/08/2024 03:34 PM    K 4.1 01/08/2024 03:34 PM     01/08/2024 03:34 PM    CO2 23 01/08/2024 03:34 PM    BUN 35 01/08/2024 03:34 PM    GFRAA 35 07/01/2022 08:35 AM     No results found for: \"HBA1C\", \"[id number]\"  Lab Results   Component Value Date/Time    CHOL 212 01/08/2024 03:34 PM    HDL 68 01/08/2024 03:34 PM    VLDL 31 02/15/2021 11:38 AM     No results found for: \"VITD3\", \"VD3RIA\"        Lab Results   Component Value Date/Time    TSH 2.80 12/27/2022 11:33 AM

## 2024-02-06 PROBLEM — Z00.00 MEDICARE ANNUAL WELLNESS VISIT, SUBSEQUENT: Status: RESOLVED | Noted: 2024-01-07 | Resolved: 2024-02-06

## 2024-03-18 RX ORDER — CARVEDILOL 3.12 MG/1
TABLET ORAL
Qty: 180 TABLET | Refills: 3 | Status: SHIPPED | OUTPATIENT
Start: 2024-03-18

## 2024-03-18 NOTE — TELEPHONE ENCOUNTER
PCP: No primary care provider on file.    Last appt: 7/5/2023  Future Appointments   Date Time Provider Department Center   7/8/2024 10:30 AM Boaz Solorzano MD [facility]       Requested Prescriptions     Pending Prescriptions Disp Refills    carvedilol (COREG) 3.125 MG tablet [Pharmacy Med Name: CARVEDILOL (IR) TABS 3.125MG] 180 tablet 3     Sig: TAKE 1 TABLET WITH BREAKFAST AND WITH EVENING MEAL       Prior labs and Blood pressures:  BP Readings from Last 3 Encounters:   01/08/24 121/77   11/29/23 (!) 144/74   09/22/23 (!) 124/55     Lab Results   Component Value Date/Time     01/08/2024 03:34 PM    K 4.1 01/08/2024 03:34 PM     01/08/2024 03:34 PM    CO2 23 01/08/2024 03:34 PM    BUN 35 01/08/2024 03:34 PM    GFRAA 35 07/01/2022 08:35 AM     No results found for: \"HBA1C\", \"EUX6SENS\"  Lab Results   Component Value Date/Time    CHOL 212 01/08/2024 03:34 PM    HDL 68 01/08/2024 03:34 PM    VLDL 31 02/15/2021 11:38 AM     No results found for: \"VITD3\", \"VD3RIA\"        Lab Results   Component Value Date/Time    TSH 2.80 12/27/2022 11:33 AM

## 2024-06-10 RX ORDER — MESALAMINE 1.2 G/1
TABLET, DELAYED RELEASE ORAL
Qty: 540 TABLET | Refills: 0 | Status: SHIPPED | OUTPATIENT
Start: 2024-06-10

## 2024-06-10 NOTE — TELEPHONE ENCOUNTER
Rx refill:      mesalamine (LIALDA) 1.2 g EC tablet [[id number]]    Order Details  Dose: -- Route: -- Frequency: --   Dispense Quantity: -- Refills: --      Sig: take 2 tablets daily with a meal    *90 Day Supply*    Pharmacy    [facility] - [place], [place] - [address] [place] [address] - P [phone] - F [phone] [address] [place] [id number]  Phone: [phone]  Fax: [phone]

## 2024-06-10 NOTE — TELEPHONE ENCOUNTER
RX refill request from the patient/pharmacy. Patient last seen 01- with labs, and next appt. scheduled for 07-  Requested Prescriptions     Pending Prescriptions Disp Refills    mesalamine (LIALDA) 1.2 g EC tablet 540 tablet 0     Sig: Take 2 tablets daily with a meal.    .

## 2024-07-08 ENCOUNTER — OFFICE VISIT (OUTPATIENT)
Facility: CLINIC | Age: 89
End: 2024-07-08
Payer: MEDICARE

## 2024-07-08 VITALS
WEIGHT: 128.5 LBS | DIASTOLIC BLOOD PRESSURE: 68 MMHG | OXYGEN SATURATION: 96 % | BODY MASS INDEX: 21.94 KG/M2 | HEIGHT: 64 IN | HEART RATE: 66 BPM | SYSTOLIC BLOOD PRESSURE: 138 MMHG | RESPIRATION RATE: 16 BRPM | TEMPERATURE: 97.5 F

## 2024-07-08 DIAGNOSIS — M85.89 OSTEOPENIA OF MULTIPLE SITES: ICD-10-CM

## 2024-07-08 DIAGNOSIS — N18.32 TYPE 2 DIABETES MELLITUS WITH STAGE 3B CHRONIC KIDNEY DISEASE, WITHOUT LONG-TERM CURRENT USE OF INSULIN (HCC): ICD-10-CM

## 2024-07-08 DIAGNOSIS — E11.22 TYPE 2 DIABETES MELLITUS WITH STAGE 3B CHRONIC KIDNEY DISEASE, WITHOUT LONG-TERM CURRENT USE OF INSULIN (HCC): ICD-10-CM

## 2024-07-08 DIAGNOSIS — E78.2 MIXED HYPERLIPIDEMIA: ICD-10-CM

## 2024-07-08 DIAGNOSIS — K21.9 GASTROESOPHAGEAL REFLUX DISEASE WITHOUT ESOPHAGITIS: ICD-10-CM

## 2024-07-08 DIAGNOSIS — N18.30 STAGE 3 CHRONIC KIDNEY DISEASE, UNSPECIFIED WHETHER STAGE 3A OR 3B CKD (HCC): ICD-10-CM

## 2024-07-08 DIAGNOSIS — I10 PRIMARY HYPERTENSION: Primary | ICD-10-CM

## 2024-07-08 DIAGNOSIS — M15.9 PRIMARY OSTEOARTHRITIS INVOLVING MULTIPLE JOINTS: ICD-10-CM

## 2024-07-08 PROCEDURE — G8427 DOCREV CUR MEDS BY ELIG CLIN: HCPCS | Performed by: INTERNAL MEDICINE

## 2024-07-08 PROCEDURE — 99214 OFFICE O/P EST MOD 30 MIN: CPT | Performed by: INTERNAL MEDICINE

## 2024-07-08 PROCEDURE — 1036F TOBACCO NON-USER: CPT | Performed by: INTERNAL MEDICINE

## 2024-07-08 PROCEDURE — G8420 CALC BMI NORM PARAMETERS: HCPCS | Performed by: INTERNAL MEDICINE

## 2024-07-08 PROCEDURE — 1090F PRES/ABSN URINE INCON ASSESS: CPT | Performed by: INTERNAL MEDICINE

## 2024-07-08 PROCEDURE — 1123F ACP DISCUSS/DSCN MKR DOCD: CPT | Performed by: INTERNAL MEDICINE

## 2024-07-08 PROCEDURE — 3044F HG A1C LEVEL LT 7.0%: CPT | Performed by: INTERNAL MEDICINE

## 2024-07-08 NOTE — PROGRESS NOTES
Alana Fontaine is a 89 y.o. female     Chief Complaint   Patient presents with    6 Month Follow-Up    Hypertension    Diabetes    Gastroesophageal Reflux       BP (!) 150/60 (Site: Left Upper Arm, Position: Sitting, Cuff Size: Large Adult)   Pulse 66   Temp 97.5 °F (36.4 °C) (Oral)   Resp 16   Ht 1.626 m (5' 4\")   Wt 58.3 kg (128 lb 8 oz)   SpO2 96%   BMI 22.06 kg/m²     Health Maintenance Due   Topic Date Due    Respiratory Syncytial Virus (RSV) Pregnant or age 60 yrs+ (1 - 1-dose 60+ series) Never done    Shingles vaccine (2 of 3) 12/30/2014    DTaP/Tdap/Td vaccine (1 - Tdap) 07/07/2017    COVID-19 Vaccine (4 - 2023-24 season) 09/01/2023         \"Have you been to the ER, urgent care clinic since your last visit?  Hospitalized since your last visit?\"    NO    “Have you seen or consulted any other health care providers outside of [facility] since your last visit?”    NO                    
(HCC)     GERD (gastroesophageal reflux disease)     Gout     Hard of hearing     hard of hearing    History of blistering sunburn     teen; Sunburn, blistering [L55.1]    Hyperlipidemia     Hypertension     Osteopenia 10/2020    FRAX 17.3/3.6%    Ulcerative colitis (HCC) 10/28/2014     Past Surgical History:   Procedure Laterality Date    CATARACT REMOVAL Bilateral     COLONOSCOPY  9/14    ulcerative colitis    IR KYPHOPLASTY LUMBAR FIRST LEVEL  9/15/2023    IR KYPHOPLASTY LUMBAR FIRST LEVEL 9/15/2023 Catalino Denny MD [facility]    IR KYPHOPLASTY THORACIC FIRST LEVEL  9/22/2023    IR KYPHOPLASTY THORACIC FIRST LEVEL 9/22/2023 [facility]    ORTHOPEDIC SURGERY      bilateral knee replacement     Allergies   Allergen Reactions    Cefdinir Diarrhea    Penicillin G Rash    Statins Other (See Comments)     arthralgia       REVIEW OF SYSTEMS:  General: negative for - chills or fever, or weight loss or gain  ENT: negative for - headaches, nasal congestion or tinnitus  Eyes: no blurred or visual changes  Neck: No stiffness or swollen nodes  Respiratory: negative for - cough, hemoptysis, shortness of breath or wheezing  Cardiovascular : negative for - chest pain, edema, palpitations or shortness of breath  Gastrointestinal: negative for - abdominal pain, blood in stools, heartburn or nausea/vomiting  Genito-Urinary: no dysuria, trouble voiding, or hematuria  Musculoskeletal: negative for - gait disturbance, joint pain, joint stiffness or joint swelling  Neurological: no TIA or stroke symptoms  Hematologic: no bruises, no bleeding  Lymphatic: no swollen glands  Integument: no lumps, mole changes, nail changes or rash  Endocrine:no malaise/lethargy poly uria or polydipsia or unexpected weight changes        Social History     Socioeconomic History    Marital status:      Spouse name: None    Number of children: None    Years of education: None    Highest education level: None   Tobacco Use    Smoking status:

## 2024-07-09 LAB
ALBUMIN SERPL-MCNC: 3.9 G/DL (ref 3.5–5)
ALBUMIN/GLOB SERPL: 1.1 (ref 1.1–2.2)
ALP SERPL-CCNC: 121 U/L (ref 45–117)
ALT SERPL-CCNC: 27 U/L (ref 12–78)
ANION GAP SERPL CALC-SCNC: 7 MMOL/L (ref 5–15)
AST SERPL-CCNC: 29 U/L (ref 15–37)
BILIRUB SERPL-MCNC: 0.6 MG/DL (ref 0.2–1)
BUN SERPL-MCNC: 40 MG/DL (ref 6–20)
BUN/CREAT SERPL: 26 (ref 12–20)
CALCIUM SERPL-MCNC: 10 MG/DL (ref 8.5–10.1)
CHLORIDE SERPL-SCNC: 112 MMOL/L (ref 97–108)
CHOLEST SERPL-MCNC: 214 MG/DL
CK SERPL-CCNC: 45 U/L (ref 26–192)
CO2 SERPL-SCNC: 23 MMOL/L (ref 21–32)
CREAT SERPL-MCNC: 1.52 MG/DL (ref 0.55–1.02)
EST. AVERAGE GLUCOSE BLD GHB EST-MCNC: 114 MG/DL
GLOBULIN SER CALC-MCNC: 3.7 G/DL (ref 2–4)
GLUCOSE SERPL-MCNC: 103 MG/DL (ref 65–100)
HBA1C MFR BLD: 5.6 % (ref 4–5.6)
HDLC SERPL-MCNC: 79 MG/DL
HDLC SERPL: 2.7 (ref 0–5)
LDLC SERPL CALC-MCNC: 108 MG/DL (ref 0–100)
POTASSIUM SERPL-SCNC: 4.7 MMOL/L (ref 3.5–5.1)
PROT SERPL-MCNC: 7.6 G/DL (ref 6.4–8.2)
SODIUM SERPL-SCNC: 142 MMOL/L (ref 136–145)
TRIGL SERPL-MCNC: 135 MG/DL
VLDLC SERPL CALC-MCNC: 27 MG/DL

## 2024-09-12 RX ORDER — FELODIPINE 5 MG/1
5 TABLET, EXTENDED RELEASE ORAL DAILY
Qty: 90 TABLET | Refills: 3 | Status: SHIPPED | OUTPATIENT
Start: 2024-09-12

## 2025-01-14 ENCOUNTER — OFFICE VISIT (OUTPATIENT)
Facility: CLINIC | Age: 89
End: 2025-01-14

## 2025-01-14 VITALS
BODY MASS INDEX: 21.87 KG/M2 | DIASTOLIC BLOOD PRESSURE: 80 MMHG | TEMPERATURE: 97.2 F | HEIGHT: 64 IN | SYSTOLIC BLOOD PRESSURE: 130 MMHG | WEIGHT: 128.1 LBS | OXYGEN SATURATION: 96 % | HEART RATE: 76 BPM

## 2025-01-14 DIAGNOSIS — E55.9 VITAMIN D DEFICIENCY: ICD-10-CM

## 2025-01-14 DIAGNOSIS — M1A.0410 IDIOPATHIC CHRONIC GOUT OF RIGHT HAND WITHOUT TOPHUS: ICD-10-CM

## 2025-01-14 DIAGNOSIS — K21.9 GASTROESOPHAGEAL REFLUX DISEASE WITHOUT ESOPHAGITIS: ICD-10-CM

## 2025-01-14 DIAGNOSIS — M15.0 PRIMARY OSTEOARTHRITIS INVOLVING MULTIPLE JOINTS: ICD-10-CM

## 2025-01-14 DIAGNOSIS — Z13.39 ALCOHOL SCREENING: ICD-10-CM

## 2025-01-14 DIAGNOSIS — M54.50 CHRONIC MIDLINE LOW BACK PAIN WITHOUT SCIATICA: ICD-10-CM

## 2025-01-14 DIAGNOSIS — K51.00 ULCERATIVE PANCOLITIS WITHOUT COMPLICATION (HCC): ICD-10-CM

## 2025-01-14 DIAGNOSIS — J30.9 ALLERGIC RHINITIS, UNSPECIFIED SEASONALITY, UNSPECIFIED TRIGGER: ICD-10-CM

## 2025-01-14 DIAGNOSIS — E03.9 HYPOTHYROIDISM, UNSPECIFIED TYPE: ICD-10-CM

## 2025-01-14 DIAGNOSIS — G89.29 CHRONIC MIDLINE LOW BACK PAIN WITHOUT SCIATICA: ICD-10-CM

## 2025-01-14 DIAGNOSIS — N18.30 STAGE 3 CHRONIC KIDNEY DISEASE, UNSPECIFIED WHETHER STAGE 3A OR 3B CKD (HCC): ICD-10-CM

## 2025-01-14 DIAGNOSIS — M85.89 OSTEOPENIA OF MULTIPLE SITES: ICD-10-CM

## 2025-01-14 DIAGNOSIS — Z00.00 MEDICARE ANNUAL WELLNESS VISIT, SUBSEQUENT: ICD-10-CM

## 2025-01-14 DIAGNOSIS — I10 PRIMARY HYPERTENSION: Primary | ICD-10-CM

## 2025-01-14 DIAGNOSIS — N18.32 TYPE 2 DIABETES MELLITUS WITH STAGE 3B CHRONIC KIDNEY DISEASE, WITHOUT LONG-TERM CURRENT USE OF INSULIN (HCC): ICD-10-CM

## 2025-01-14 DIAGNOSIS — E11.22 TYPE 2 DIABETES MELLITUS WITH STAGE 3B CHRONIC KIDNEY DISEASE, WITHOUT LONG-TERM CURRENT USE OF INSULIN (HCC): ICD-10-CM

## 2025-01-14 DIAGNOSIS — E78.2 MIXED HYPERLIPIDEMIA: ICD-10-CM

## 2025-01-14 RX ORDER — MESALAMINE 1.2 G/1
TABLET, DELAYED RELEASE ORAL
Qty: 540 TABLET | Refills: 1 | Status: CANCELLED | OUTPATIENT
Start: 2025-01-14

## 2025-01-14 RX ORDER — CARVEDILOL 3.12 MG/1
3.12 TABLET ORAL 2 TIMES DAILY
Qty: 180 TABLET | Refills: 3 | Status: CANCELLED | OUTPATIENT
Start: 2025-01-14

## 2025-01-14 RX ORDER — TRAMADOL HYDROCHLORIDE 50 MG/1
50 TABLET ORAL 2 TIMES DAILY PRN
Qty: 100 TABLET | Refills: 0 | Status: SHIPPED | OUTPATIENT
Start: 2025-01-14 | End: 2025-03-05

## 2025-01-14 RX ORDER — ALLOPURINOL 100 MG/1
100 TABLET ORAL DAILY
Qty: 90 TABLET | Refills: 3 | Status: CANCELLED | OUTPATIENT
Start: 2025-01-14

## 2025-01-14 RX ORDER — LEVOTHYROXINE SODIUM 50 UG/1
TABLET ORAL
Qty: 90 TABLET | Refills: 3 | Status: CANCELLED | OUTPATIENT
Start: 2025-01-14

## 2025-01-14 ASSESSMENT — PATIENT HEALTH QUESTIONNAIRE - PHQ9
7. TROUBLE CONCENTRATING ON THINGS, SUCH AS READING THE NEWSPAPER OR WATCHING TELEVISION: NOT AT ALL
1. LITTLE INTEREST OR PLEASURE IN DOING THINGS: SEVERAL DAYS
SUM OF ALL RESPONSES TO PHQ QUESTIONS 1-9: 14
2. FEELING DOWN, DEPRESSED OR HOPELESS: SEVERAL DAYS
3. TROUBLE FALLING OR STAYING ASLEEP: NEARLY EVERY DAY
6. FEELING BAD ABOUT YOURSELF - OR THAT YOU ARE A FAILURE OR HAVE LET YOURSELF OR YOUR FAMILY DOWN: NEARLY EVERY DAY
9. THOUGHTS THAT YOU WOULD BE BETTER OFF DEAD, OR OF HURTING YOURSELF: NOT AT ALL
SUM OF ALL RESPONSES TO PHQ QUESTIONS 1-9: 14
4. FEELING TIRED OR HAVING LITTLE ENERGY: NEARLY EVERY DAY
5. POOR APPETITE OR OVEREATING: NEARLY EVERY DAY
8. MOVING OR SPEAKING SO SLOWLY THAT OTHER PEOPLE COULD HAVE NOTICED. OR THE OPPOSITE, BEING SO FIGETY OR RESTLESS THAT YOU HAVE BEEN MOVING AROUND A LOT MORE THAN USUAL: NOT AT ALL
10. IF YOU CHECKED OFF ANY PROBLEMS, HOW DIFFICULT HAVE THESE PROBLEMS MADE IT FOR YOU TO DO YOUR WORK, TAKE CARE OF THINGS AT HOME, OR GET ALONG WITH OTHER PEOPLE: VERY DIFFICULT
SUM OF ALL RESPONSES TO PHQ9 QUESTIONS 1 & 2: 2
SUM OF ALL RESPONSES TO PHQ QUESTIONS 1-9: 14
SUM OF ALL RESPONSES TO PHQ QUESTIONS 1-9: 14

## 2025-01-14 NOTE — PROGRESS NOTES
This is a Subsequent Medicare Annual Wellness Visit providing Personalized Prevention Plan Services (PPPS) (Performed 12 months after initial AWV and PPPS )    I have reviewed the patient's medical history in detail and updated the computerized patient record.  She presents today for Medicare subsequent annual wellness examination and screening questionnaire.    She is also in follow-up of her multimedical problems include hypertension, diabetes, hyperlipidemia, GERD, cirrhosis, asthma, allergic rhinitis, history of gout, osteopenia, ulcerative colitis and other multiple medical problems.  She currently denies any chest pain, shortness of breath or cardiac respiratory complaints.  She notes no current GI or  complaints.  She has no headaches, dizziness or new neurologic complaints.  She has no change of her chronic arthritic complaints with no new arthritic complaints.  She has had a prescription in the past for tramadol she takes very sparingly will like a refill on that.  She has no other complaints on complete review of systems.    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 and albumin creatinine ratio <30 mg/g (HCC)     Diabetes mellitus (HCC)     GERD (gastroesophageal reflux disease)     Gout     Hard of hearing     hard of hearing    History of blistering sunburn     teen; Sunburn, blistering [L55.1]    Hyperlipidemia     Hypertension     Osteopenia 10/2020    FRAX 17.3/3.6%    Ulcerative colitis (HCC) 10/28/2014      Past Surgical History:   Procedure Laterality Date    CATARACT REMOVAL Bilateral     COLONOSCOPY  9/14    ulcerative colitis    IR KYPHOPLASTY LUMBAR 1 VERTEBRAL BODY  9/15/2023    IR KYPHOPLASTY LUMBAR FIRST LEVEL 9/15/2023 Catalino Denny MD [facility]    IR KYPHOPLASTY THORACIC 1 VERTEBRAL BODY  9/22/2023

## 2025-01-14 NOTE — PATIENT INSTRUCTIONS
illness with fewer medicines.  No matter how old you are, how fit you are, or what health problems you have, there is a form of activity that will work for you. And the more physical activity you can do, the better your overall health will be.  What kinds of activity can help you stay healthy?  Being more active will make your daily activities easier. Physical activity includes planned exercise and things you do in daily life. There are four types of activity:  Aerobic.  Doing aerobic activity makes your heart and lungs strong.  Includes walking, dancing, and gardening.  Aim for at least 2½ hours spread throughout the week.  It improves your energy and can help you sleep better.  Muscle-strengthening.  This type of activity can help maintain muscle and strengthen bones.  Includes climbing stairs, using resistance bands, and lifting or carrying heavy loads.  Aim for at least twice a week.  It can help protect the knees and other joints.  Stretching.  Stretching gives you better range of motion in joints and muscles.  Includes upper arm stretches, calf stretches, and gentle yoga.  Aim for at least twice a week, preferably after your muscles are warmed up from other activities.  It can help you function better in daily life.  Balancing.  This helps you stay coordinated and have good posture.  Includes heel-to-toe walking, virgilio chi, and certain types of yoga.  Aim for at least 3 days a week.  It can reduce your risk of falling.  Even if you have a hard time meeting the recommendations, it's better to be more active than less active. All activity done in each category counts toward your weekly total. You'd be surprised how daily things like carrying groceries, keeping up with grandchildren, and taking the stairs can add up.  What keeps you from being active?  If you've had a hard time being more active, you're not alone. Maybe you remember being able to do more. Or maybe you've never thought of yourself as being active.

## 2025-01-15 LAB
APPEARANCE UR: CLEAR
BACTERIA URNS QL MICRO: NEGATIVE /HPF
BILIRUB UR QL: NEGATIVE
COLOR UR: ABNORMAL
CREAT UR-MCNC: 56.9 MG/DL
EPITH CASTS URNS QL MICRO: ABNORMAL /LPF
GLUCOSE UR STRIP.AUTO-MCNC: NEGATIVE MG/DL
HGB UR QL STRIP: NEGATIVE
HYALINE CASTS URNS QL MICRO: ABNORMAL /LPF (ref 0–5)
KETONES UR QL STRIP.AUTO: NEGATIVE MG/DL
LEUKOCYTE ESTERASE UR QL STRIP.AUTO: ABNORMAL
MICROALBUMIN UR-MCNC: 3.39 MG/DL
MICROALBUMIN/CREAT UR-RTO: 60 MG/G (ref 0–30)
NITRITE UR QL STRIP.AUTO: NEGATIVE
PH UR STRIP: 5 (ref 5–8)
PROT UR STRIP-MCNC: ABNORMAL MG/DL
RBC #/AREA URNS HPF: ABNORMAL /HPF (ref 0–5)
SP GR UR REFRACTOMETRY: 1.02 (ref 1–1.03)
UROBILINOGEN UR QL STRIP.AUTO: 0.2 EU/DL (ref 0.2–1)
WBC URNS QL MICRO: ABNORMAL /HPF (ref 0–4)

## 2025-01-16 RX ORDER — ALLOPURINOL 100 MG/1
100 TABLET ORAL DAILY
Qty: 90 TABLET | Refills: 3 | Status: SHIPPED | OUTPATIENT
Start: 2025-01-16

## 2025-01-16 RX ORDER — MESALAMINE 1.2 G/1
TABLET, DELAYED RELEASE ORAL
Qty: 540 TABLET | Refills: 0 | Status: SHIPPED | OUTPATIENT
Start: 2025-01-16

## 2025-01-16 RX ORDER — CARVEDILOL 3.12 MG/1
3.12 TABLET ORAL 2 TIMES DAILY WITH MEALS
Qty: 180 TABLET | Refills: 3 | Status: SHIPPED | OUTPATIENT
Start: 2025-01-16

## 2025-01-16 NOTE — TELEPHONE ENCOUNTER
RX refill request from the patient/pharmacy. Patient last seen 01- with labs, and next appt. scheduled for 07-.    Requested Prescriptions     Pending Prescriptions Disp Refills    allopurinol (ZYLOPRIM) 100 MG tablet 90 tablet 3     Sig: Take 1 tablet by mouth daily    mesalamine (LIALDA) 1.2 g EC tablet 540 tablet 0     Sig: Take 2 tablets daily with a meal.    carvedilol (COREG) 3.125 MG tablet 180 tablet 3     Sig: Take 1 tablet by mouth 2 times daily (with meals)

## 2025-01-17 LAB
25(OH)D3 SERPL-MCNC: 40.8 NG/ML (ref 30–100)
ALBUMIN SERPL-MCNC: 3.8 G/DL (ref 3.5–5)
ALBUMIN/GLOB SERPL: 1.1 (ref 1.1–2.2)
ALP SERPL-CCNC: 111 U/L (ref 45–117)
ALT SERPL-CCNC: 33 U/L (ref 12–78)
ANION GAP SERPL CALC-SCNC: 6 MMOL/L (ref 2–12)
AST SERPL-CCNC: 29 U/L (ref 15–37)
BASOPHILS # BLD: 0.08 K/UL (ref 0–0.1)
BASOPHILS NFR BLD: 0.9 % (ref 0–1)
BILIRUB SERPL-MCNC: 0.5 MG/DL (ref 0.2–1)
BUN SERPL-MCNC: 33 MG/DL (ref 6–20)
BUN/CREAT SERPL: 23 (ref 12–20)
CALCIUM SERPL-MCNC: 10 MG/DL (ref 8.5–10.1)
CHLORIDE SERPL-SCNC: 113 MMOL/L (ref 97–108)
CHOLEST SERPL-MCNC: 210 MG/DL
CK SERPL-CCNC: 46 U/L (ref 26–192)
CO2 SERPL-SCNC: 21 MMOL/L (ref 21–32)
CREAT SERPL-MCNC: 1.46 MG/DL (ref 0.55–1.02)
DIFFERENTIAL METHOD BLD: ABNORMAL
EOSINOPHIL # BLD: 0.46 K/UL (ref 0–0.4)
EOSINOPHIL NFR BLD: 5 % (ref 0–7)
ERYTHROCYTE [DISTWIDTH] IN BLOOD BY AUTOMATED COUNT: 16.2 % (ref 11.5–14.5)
EST. AVERAGE GLUCOSE BLD GHB EST-MCNC: 123 MG/DL
GLOBULIN SER CALC-MCNC: 3.6 G/DL (ref 2–4)
GLUCOSE SERPL-MCNC: 97 MG/DL (ref 65–100)
HBA1C MFR BLD: 5.9 % (ref 4–5.6)
HCT VFR BLD AUTO: 34.4 % (ref 35–47)
HDLC SERPL-MCNC: 84 MG/DL
HDLC SERPL: 2.5 (ref 0–5)
HGB BLD-MCNC: 10.8 G/DL (ref 11.5–16)
IMM GRANULOCYTES # BLD AUTO: 0.03 K/UL (ref 0–0.04)
IMM GRANULOCYTES NFR BLD AUTO: 0.3 % (ref 0–0.5)
LDLC SERPL CALC-MCNC: 100 MG/DL (ref 0–100)
LYMPHOCYTES # BLD: 4.66 K/UL (ref 0.8–3.5)
LYMPHOCYTES NFR BLD: 50.9 % (ref 12–49)
MCH RBC QN AUTO: 31.4 PG (ref 26–34)
MCHC RBC AUTO-ENTMCNC: 31.4 G/DL (ref 30–36.5)
MCV RBC AUTO: 100 FL (ref 80–99)
MONOCYTES # BLD: 0.69 K/UL (ref 0–1)
MONOCYTES NFR BLD: 7.5 % (ref 5–13)
NEUTS SEG # BLD: 3.24 K/UL (ref 1.8–8)
NEUTS SEG NFR BLD: 35.4 % (ref 32–75)
NRBC # BLD: 0 K/UL (ref 0–0.01)
NRBC BLD-RTO: 0 PER 100 WBC
PLATELET # BLD AUTO: 200 K/UL (ref 150–400)
PMV BLD AUTO: 13 FL (ref 8.9–12.9)
POTASSIUM SERPL-SCNC: 4.9 MMOL/L (ref 3.5–5.1)
PROT SERPL-MCNC: 7.4 G/DL (ref 6.4–8.2)
RBC # BLD AUTO: 3.44 M/UL (ref 3.8–5.2)
SODIUM SERPL-SCNC: 140 MMOL/L (ref 136–145)
T4 FREE SERPL-MCNC: 1.1 NG/DL (ref 0.8–1.5)
TRIGL SERPL-MCNC: 130 MG/DL
TSH SERPL DL<=0.05 MIU/L-ACNC: 2.34 UIU/ML (ref 0.36–3.74)
VLDLC SERPL CALC-MCNC: 26 MG/DL
WBC # BLD AUTO: 9.2 K/UL (ref 3.6–11)

## 2025-01-29 DIAGNOSIS — E03.9 PRIMARY HYPOTHYROIDISM: Primary | ICD-10-CM

## 2025-01-29 RX ORDER — LEVOTHYROXINE SODIUM 50 UG/1
TABLET ORAL
Qty: 90 TABLET | Refills: 3 | Status: SHIPPED | OUTPATIENT
Start: 2025-01-29

## 2025-01-29 NOTE — TELEPHONE ENCOUNTER
RX refill request from the patient/pharmacy. Patient last seen 01/14/2025 with labs, and next appt. scheduled for 07/14/2025.     Requested Prescriptions     Pending Prescriptions Disp Refills    levothyroxine (SYNTHROID) 50 MCG tablet 90 tablet 3     Sig: TAKE 1 TABLET DAILY BEFORE BREAKFAST

## 2025-02-12 PROBLEM — Z00.00 MEDICARE ANNUAL WELLNESS VISIT, SUBSEQUENT: Status: RESOLVED | Noted: 2024-01-07 | Resolved: 2025-02-12

## 2025-07-11 NOTE — PROGRESS NOTES
Chief Complaint   Patient presents with    6 Month Follow-Up       SUBJECTIVE:    Alana Fontaine is a 89 y.o. female who returns in follow-up for medical problems include hypertension, hyperlipidemia for which she is no longer on a statin, GERD, allergic rhinitis, ulcerative colitis, diabetes and other medical problems.  She is taking the medication Trant follow her diet remains physically active.  She noted she stopped the Crestor because her joint she is taking too much.  She notes no chest pain, shortness of breath or cardiac respiratory complaints.  She notes no current GI or  complaints.  She has no headaches, dizziness or new neurologic complaints.  She has no new arthritic complaints and no other complaints on complete review of systems.      Current Outpatient Medications   Medication Sig Dispense Refill    levothyroxine (SYNTHROID) 50 MCG tablet TAKE 1 TABLET DAILY BEFORE BREAKFAST 90 tablet 3    allopurinol (ZYLOPRIM) 100 MG tablet Take 1 tablet by mouth daily 90 tablet 3    mesalamine (LIALDA) 1.2 g EC tablet Take 2 tablets daily with a meal. 540 tablet 0    carvedilol (COREG) 3.125 MG tablet Take 1 tablet by mouth 2 times daily (with meals) 180 tablet 3    felodipine (PLENDIL) 5 MG extended release tablet Take 1 tablet by mouth daily 90 tablet 3    ACETAMINOPHEN PO Take by mouth      vitamin D (CHOLECALCIFEROL) 25 MCG (1000 UT) TABS tablet Take by mouth daily       No current facility-administered medications for this visit.     Past Medical History:   Diagnosis Date    Acquired hypothyroidism     Allergic rhinitis     asthma allergies    Arthritis     Cirrhosis (HCC) 09/2020    CT finding (nodular liver)    CKD stage G3b/A1, GFR 30-44 and albumin creatinine ratio <30 mg/g (HCC)     Diabetes mellitus (HCC)     GERD (gastroesophageal reflux disease)     Gout     Hard of hearing     hard of hearing    History of blistering sunburn     teen; Sunburn, blistering [L55.1]    Hyperlipidemia

## 2025-07-14 ENCOUNTER — OFFICE VISIT (OUTPATIENT)
Facility: CLINIC | Age: 89
End: 2025-07-14

## 2025-07-14 VITALS
SYSTOLIC BLOOD PRESSURE: 136 MMHG | RESPIRATION RATE: 17 BRPM | OXYGEN SATURATION: 94 % | HEIGHT: 64 IN | BODY MASS INDEX: 21.07 KG/M2 | WEIGHT: 123.4 LBS | DIASTOLIC BLOOD PRESSURE: 74 MMHG | TEMPERATURE: 97.6 F | HEART RATE: 80 BPM

## 2025-07-14 DIAGNOSIS — I10 PRIMARY HYPERTENSION: Primary | ICD-10-CM

## 2025-07-14 DIAGNOSIS — K21.9 GASTROESOPHAGEAL REFLUX DISEASE WITHOUT ESOPHAGITIS: ICD-10-CM

## 2025-07-14 DIAGNOSIS — N18.30 STAGE 3 CHRONIC KIDNEY DISEASE, UNSPECIFIED WHETHER STAGE 3A OR 3B CKD (HCC): ICD-10-CM

## 2025-07-14 DIAGNOSIS — N18.32 TYPE 2 DIABETES MELLITUS WITH STAGE 3B CHRONIC KIDNEY DISEASE, WITHOUT LONG-TERM CURRENT USE OF INSULIN (HCC): ICD-10-CM

## 2025-07-14 DIAGNOSIS — E78.2 MIXED HYPERLIPIDEMIA: ICD-10-CM

## 2025-07-14 DIAGNOSIS — M15.0 PRIMARY OSTEOARTHRITIS INVOLVING MULTIPLE JOINTS: ICD-10-CM

## 2025-07-14 DIAGNOSIS — E11.22 TYPE 2 DIABETES MELLITUS WITH STAGE 3B CHRONIC KIDNEY DISEASE, WITHOUT LONG-TERM CURRENT USE OF INSULIN (HCC): ICD-10-CM

## 2025-07-14 SDOH — ECONOMIC STABILITY: FOOD INSECURITY: WITHIN THE PAST 12 MONTHS, YOU WORRIED THAT YOUR FOOD WOULD RUN OUT BEFORE YOU GOT MONEY TO BUY MORE.: NEVER TRUE

## 2025-07-14 SDOH — ECONOMIC STABILITY: FOOD INSECURITY: WITHIN THE PAST 12 MONTHS, THE FOOD YOU BOUGHT JUST DIDN'T LAST AND YOU DIDN'T HAVE MONEY TO GET MORE.: NEVER TRUE

## 2025-07-14 NOTE — PROGRESS NOTES
Alana Fontaine is a 89 y.o. female     Chief Complaint   Patient presents with    6 Month Follow-Up       BP (!) 175/70 (BP Site: Left Upper Arm, Patient Position: Sitting, BP Cuff Size: Small Adult)   Pulse 80   Temp 97.6 °F (36.4 °C) (Temporal)   Resp 17   Ht 1.626 m (5' 4\")   Wt 56 kg (123 lb 6.4 oz)   SpO2 94%   BMI 21.18 kg/m²     Health Maintenance Due   Topic Date Due    Respiratory Syncytial Virus (RSV) Pregnant or age 60 yrs+ (1 - 1-dose 75+ series) Never done    Shingles vaccine (2 of 3) 12/30/2014    DTaP/Tdap/Td vaccine (1 - Tdap) 07/07/2017    COVID-19 Vaccine (4 - 2024-25 season) 09/01/2024         \"Have you been to the ER, urgent care clinic since your last visit?  Hospitalized since your last visit?\"    NO    “Have you seen or consulted any other health care providers outside of [facility] since your last visit?”    NO

## 2025-07-16 LAB
ALBUMIN SERPL-MCNC: 3.9 G/DL (ref 3.5–5)
ALBUMIN/GLOB SERPL: 1 (ref 1.1–2.2)
ALP SERPL-CCNC: 104 U/L (ref 45–117)
ALT SERPL-CCNC: 39 U/L (ref 12–78)
ANION GAP SERPL CALC-SCNC: 8 MMOL/L (ref 2–12)
AST SERPL-CCNC: 31 U/L (ref 15–37)
BILIRUB SERPL-MCNC: 0.4 MG/DL (ref 0.2–1)
BUN SERPL-MCNC: 31 MG/DL (ref 6–20)
BUN/CREAT SERPL: 18 (ref 12–20)
CALCIUM SERPL-MCNC: 9.8 MG/DL (ref 8.5–10.1)
CHLORIDE SERPL-SCNC: 113 MMOL/L (ref 97–108)
CHOLEST SERPL-MCNC: 186 MG/DL
CK SERPL-CCNC: 47 U/L (ref 26–192)
CO2 SERPL-SCNC: 22 MMOL/L (ref 21–32)
CREAT SERPL-MCNC: 1.72 MG/DL (ref 0.55–1.02)
EST. AVERAGE GLUCOSE BLD GHB EST-MCNC: 114 MG/DL
GLOBULIN SER CALC-MCNC: 3.8 G/DL (ref 2–4)
GLUCOSE SERPL-MCNC: 107 MG/DL (ref 65–100)
HBA1C MFR BLD: 5.6 % (ref 4–5.6)
HDLC SERPL-MCNC: 77 MG/DL
HDLC SERPL: 2.4 (ref 0–5)
LDLC SERPL CALC-MCNC: 78.6 MG/DL (ref 0–100)
POTASSIUM SERPL-SCNC: 4.9 MMOL/L (ref 3.5–5.1)
PROT SERPL-MCNC: 7.7 G/DL (ref 6.4–8.2)
SODIUM SERPL-SCNC: 143 MMOL/L (ref 136–145)
TRIGL SERPL-MCNC: 152 MG/DL
VLDLC SERPL CALC-MCNC: 30.4 MG/DL

## (undated) DEVICE — PREP SKN CHLRAPRP APL 26ML STR --

## (undated) DEVICE — ADULT SPO2 SENSOR: Brand: NELLCOR

## (undated) DEVICE — POLYLINED TOWEL: Brand: CONVERTORS

## (undated) DEVICE — SYR 5ML 1/5 GRAD LL NSAF LF --

## (undated) DEVICE — NEEDLE SPNL L4.75IN OD25GA QNCKE TYP SPINOCAN

## (undated) DEVICE — NEEDLE HYPO 18GA L1.5IN PNK S STL HUB POLYPR SHLD REG BVL

## (undated) DEVICE — TOWEL SURG W17XL27IN STD BLU COT NONFENESTRATED PREWASHED

## (undated) DEVICE — STERILE POLYISOPRENE POWDER-FREE SURGICAL GLOVES: Brand: PROTEXIS

## (undated) DEVICE — SET EXTN PRIMING 0.59ML 8.5IN 1.55LB PRSS RATE MINIBOR PUR

## (undated) DEVICE — MARKER,SKIN,WI/RULER AND LABELS: Brand: MEDLINE

## (undated) DEVICE — NEEDLE HYPO 25GA L1.5IN BVL ORIENTED ECLIPSE

## (undated) DEVICE — SYR 10ML LUER LOK 1/5ML GRAD --